# Patient Record
Sex: FEMALE | Race: WHITE | Employment: OTHER | URBAN - METROPOLITAN AREA
[De-identification: names, ages, dates, MRNs, and addresses within clinical notes are randomized per-mention and may not be internally consistent; named-entity substitution may affect disease eponyms.]

---

## 2021-05-21 ENCOUNTER — HOSPITAL ENCOUNTER (INPATIENT)
Age: 76
LOS: 22 days | Discharge: HOME HEALTH | DRG: 057 | End: 2021-06-12
Attending: PHYSICAL MEDICINE & REHABILITATION | Admitting: PHYSICAL MEDICINE & REHABILITATION
Payer: MEDICARE

## 2021-05-21 PROBLEM — I61.2: Status: ACTIVE | Noted: 2021-05-21

## 2021-05-21 PROCEDURE — 1280000000 HC REHAB R&B

## 2021-05-21 PROCEDURE — 6370000000 HC RX 637 (ALT 250 FOR IP): Performed by: PHYSICAL MEDICINE & REHABILITATION

## 2021-05-21 RX ORDER — POTASSIUM CHLORIDE 20MEQ/15ML
20 LIQUID (ML) ORAL DAILY
Status: ON HOLD | COMMUNITY
End: 2021-06-11 | Stop reason: HOSPADM

## 2021-05-21 RX ORDER — MENTHOL 40 MG/ML
GEL TOPICAL 3 TIMES DAILY
COMMUNITY

## 2021-05-21 RX ORDER — FUROSEMIDE 20 MG/1
20 TABLET ORAL 2 TIMES DAILY
Status: ON HOLD | COMMUNITY
End: 2021-06-11 | Stop reason: HOSPADM

## 2021-05-21 RX ORDER — BISACODYL 10 MG
10 SUPPOSITORY, RECTAL RECTAL DAILY PRN
Status: DISCONTINUED | OUTPATIENT
Start: 2021-05-21 | End: 2021-06-12 | Stop reason: HOSPADM

## 2021-05-21 RX ORDER — ZINC OXIDE AND DIMETHICONE 120; 10 MG/G; MG/G
CREAM TOPICAL 2 TIMES DAILY PRN
COMMUNITY

## 2021-05-21 RX ORDER — VITAMIN B COMPLEX
2000 TABLET ORAL DAILY
Status: DISCONTINUED | OUTPATIENT
Start: 2021-05-21 | End: 2021-06-12 | Stop reason: HOSPADM

## 2021-05-21 RX ORDER — ATORVASTATIN CALCIUM 40 MG/1
40 TABLET, FILM COATED ORAL DAILY
COMMUNITY

## 2021-05-21 RX ORDER — PREDNISONE 1 MG/1
7.5 TABLET ORAL DAILY
COMMUNITY

## 2021-05-21 RX ORDER — CITALOPRAM 20 MG/1
20 TABLET ORAL DAILY
Status: DISCONTINUED | OUTPATIENT
Start: 2021-05-21 | End: 2021-06-12 | Stop reason: HOSPADM

## 2021-05-21 RX ORDER — LISINOPRIL 40 MG/1
40 TABLET ORAL DAILY
Status: DISCONTINUED | OUTPATIENT
Start: 2021-05-21 | End: 2021-06-12 | Stop reason: HOSPADM

## 2021-05-21 RX ORDER — CARVEDILOL 12.5 MG/1
25 TABLET ORAL 2 TIMES DAILY WITH MEALS
Status: DISCONTINUED | OUTPATIENT
Start: 2021-05-21 | End: 2021-06-12 | Stop reason: HOSPADM

## 2021-05-21 RX ORDER — LANOLIN ALCOHOL/MO/W.PET/CERES
3 CREAM (GRAM) TOPICAL DAILY
Status: ON HOLD | COMMUNITY
End: 2021-06-11 | Stop reason: HOSPADM

## 2021-05-21 RX ORDER — POLYETHYLENE GLYCOL 3350 17 G/17G
17 POWDER, FOR SOLUTION ORAL DAILY
Status: DISCONTINUED | OUTPATIENT
Start: 2021-05-21 | End: 2021-06-12 | Stop reason: HOSPADM

## 2021-05-21 RX ORDER — LANSOPRAZOLE 30 MG/1
30 CAPSULE, DELAYED RELEASE ORAL DAILY
Status: ON HOLD | COMMUNITY
End: 2021-06-11 | Stop reason: HOSPADM

## 2021-05-21 RX ORDER — FUROSEMIDE 20 MG/1
20 TABLET ORAL DAILY
Status: DISCONTINUED | OUTPATIENT
Start: 2021-05-21 | End: 2021-06-12 | Stop reason: HOSPADM

## 2021-05-21 RX ORDER — ASPIRIN 81 MG/1
81 TABLET, CHEWABLE ORAL DAILY
COMMUNITY

## 2021-05-21 RX ORDER — LEVOTHYROXINE SODIUM 0.05 MG/1
50 TABLET ORAL DAILY
Status: ON HOLD | COMMUNITY
End: 2021-06-11 | Stop reason: HOSPADM

## 2021-05-21 RX ORDER — PREDNISONE 1 MG/1
7.5 TABLET ORAL DAILY
Status: DISCONTINUED | OUTPATIENT
Start: 2021-05-21 | End: 2021-06-12 | Stop reason: HOSPADM

## 2021-05-21 RX ORDER — ACETAMINOPHEN 500 MG
650 TABLET ORAL EVERY 6 HOURS PRN
COMMUNITY

## 2021-05-21 RX ORDER — ECHINACEA PURPUREA EXTRACT 125 MG
1 TABLET ORAL PRN
COMMUNITY

## 2021-05-21 RX ORDER — LISINOPRIL 40 MG/1
40 TABLET ORAL DAILY
COMMUNITY

## 2021-05-21 RX ORDER — CITALOPRAM 20 MG/1
20 TABLET ORAL DAILY
COMMUNITY

## 2021-05-21 RX ORDER — AMLODIPINE BESYLATE 5 MG/1
5 TABLET ORAL DAILY
Status: DISCONTINUED | OUTPATIENT
Start: 2021-05-21 | End: 2021-06-12 | Stop reason: HOSPADM

## 2021-05-21 RX ORDER — ONDANSETRON HYDROCHLORIDE 8 MG/1
4 TABLET, FILM COATED ORAL EVERY 8 HOURS PRN
Status: DISCONTINUED | OUTPATIENT
Start: 2021-05-21 | End: 2021-06-12 | Stop reason: HOSPADM

## 2021-05-21 RX ORDER — MECOBALAMIN 5000 MCG
5 TABLET,DISINTEGRATING ORAL NIGHTLY
Status: DISCONTINUED | OUTPATIENT
Start: 2021-05-21 | End: 2021-06-12 | Stop reason: HOSPADM

## 2021-05-21 RX ORDER — CARVEDILOL 25 MG/1
25 TABLET ORAL 2 TIMES DAILY WITH MEALS
COMMUNITY

## 2021-05-21 RX ORDER — ATORVASTATIN CALCIUM 40 MG/1
40 TABLET, FILM COATED ORAL NIGHTLY
Status: DISCONTINUED | OUTPATIENT
Start: 2021-05-21 | End: 2021-06-12 | Stop reason: HOSPADM

## 2021-05-21 RX ORDER — ASPIRIN 81 MG/1
81 TABLET, CHEWABLE ORAL DAILY
Status: DISCONTINUED | OUTPATIENT
Start: 2021-05-21 | End: 2021-06-12 | Stop reason: HOSPADM

## 2021-05-21 RX ORDER — LEVOTHYROXINE SODIUM 0.05 MG/1
50 TABLET ORAL DAILY
Status: DISCONTINUED | OUTPATIENT
Start: 2021-05-21 | End: 2021-06-12 | Stop reason: HOSPADM

## 2021-05-21 RX ORDER — SENNA AND DOCUSATE SODIUM 50; 8.6 MG/1; MG/1
2 TABLET, FILM COATED ORAL 2 TIMES DAILY
Status: DISCONTINUED | OUTPATIENT
Start: 2021-05-21 | End: 2021-05-26

## 2021-05-21 RX ORDER — PANTOPRAZOLE SODIUM 40 MG/1
40 TABLET, DELAYED RELEASE ORAL
Status: DISCONTINUED | OUTPATIENT
Start: 2021-05-22 | End: 2021-06-12 | Stop reason: HOSPADM

## 2021-05-21 RX ORDER — ACETAMINOPHEN 325 MG/1
650 TABLET ORAL EVERY 4 HOURS PRN
Status: DISCONTINUED | OUTPATIENT
Start: 2021-05-21 | End: 2021-06-12 | Stop reason: HOSPADM

## 2021-05-21 RX ADMIN — ATORVASTATIN CALCIUM 40 MG: 40 TABLET, FILM COATED ORAL at 22:40

## 2021-05-21 RX ADMIN — DOCUSATE SODIUM 50 MG AND SENNOSIDES 8.6 MG 2 TABLET: 8.6; 5 TABLET, FILM COATED ORAL at 23:34

## 2021-05-21 RX ADMIN — CARVEDILOL 25 MG: 12.5 TABLET, FILM COATED ORAL at 22:40

## 2021-05-21 RX ADMIN — Medication 5 MG: at 22:40

## 2021-05-21 ASSESSMENT — PAIN SCALES - GENERAL
PAINLEVEL_OUTOF10: 0

## 2021-05-21 NOTE — PROGRESS NOTES
Pt admitted into room 3104. Vital signs as charted. Pt currently denies experiencing any pain at this time. For patient safety, Visual Surveillance is in place, reviewed with patient/family, verbalized understanding. Call light placed within reach. RN will continue to monitor Pt.

## 2021-05-21 NOTE — PROGRESS NOTES
4 Eyes Admission Assessment     I agree as the admission nurse that 2 RN's have performed a thorough Head to Toe Skin Assessment on the patient. ALL assessment sites listed below have been assessed on admission. Areas assessed by both nurses: Liz RN and Ines RN  [x]   Head, Face, and Ears   [x]   Shoulders, Back, and Chest  [x]   Arms, Elbows, and Hands   [x]   Coccyx, Sacrum, and Ischium  [x]   Legs, Feet, and Heels        Does the Patient have Skin Breakdown? Pt has scattered bruising and abrasions. Pt had shearing noted to her bilat hips with petechiae and to her groin folds. Pt's periarea was pink. Pt's coccyx is pink and blanchable. Pt has a healing PEG tube site to her mid upper abdomen that is scabbed over. Pt has an abrasion to her L ring finger, bruising and an abrasion to her L middle finger, and bruising and a scabbed laceration to her L index finger. Pt has a colostomy to her LLQ and anus has previously been surgically closed.           Sandoval Prevention initiated:  Yes   Wound Care Orders initiated: N/A      WOC nurse consulted for Pressure Injury (Stage 3,4, Unstageable, DTI, NWPT, and Complex wounds) or Sandoval score 18 or lower:  Yes      Nurse 1 eSignature: Electronically signed by Mumtaz Machado RN on 5/21/21 at 6:29 PM EDT    **SHARE this note so that the co-signing nurse is able to place an eSignature**    Nurse 2 eSignature: Milla Dickerson RN

## 2021-05-21 NOTE — PLAN OF CARE
grooming containers I'ly :  Long term goals  Time Frame for Long term goals : 4 weeks  Long term goal 1: Pt will complete toilet transfer w/ CGA  Long term goal 2: Pt will complete toileting tasks w/ CGA  Long term goal 3: Pt will complete LE dressing (either supine or bed or seated ) w/ CGA  Long term goal 4: Pt will complete UE dressing w/ setup and spvn  Long term goal 5: Pt will complete bathing tasks w/ SBA :  These goals were reviewed with this patient at the time of assessment and Kim Rae is in agreement    Plan of Care:  Pt to be seen 5 out of 7 days per week per ARU protocol (75 minutes with OT)  Patient Education: Pt verb understanding    SPEECH THERAPY:  Goals:             Short-term Goals  Timeframe for Short-term Goals: 1-2 wks or LOS  Goal 1: Patient will participate in ongoing cognitive-linguistic assessment. Goal 2: Patient will complete tasks involving executive function skills with 90% accuracy/min cues. Timeframe for Short-term Goals: 1-2 wks or LOS      Plan of Care:  Pt to be seen 5 out of 7 days per week per ARU protocol (30 minutes with SLP)    Therapy Treatments will include:  [x]  therapeutic exercises    [x]  gait training     [x]  neuromuscular re-ed                            [x]  transfer training             [x] community reintegration    [x] bed mobility                          [x]  w/c mobility and training  [x]  self care    [x]home mgmt    [x]  cognitive training            [x]  energy conservation        []  dysphagia tx    []  speech/language/communication therapy   []  group therapy    [x]  patient/family education    [] Other:    CASE MANAGEMENT:  Goals: Patient is from home where she was independent at baseline. Goal is to return back home as close to baseline as possible. SW to work with patient on any type of DME equipment needs, family/community support, and home set up to facility return to patient's home.    Assist patient/family with discharge planning, patient/family counseling,   and coordination with insurance during ARU stay.     Admission Period/Goal QM SCORES  QM Admit/Goal Score   Eating   / Discharge Goal: Independent   Oral Hygiene   / Discharge Goal: Independent   Shower/Bathing CARE Score: 2 / Discharge Goal: Supervision or touching assistance   UB Dressing CARE Score: 3 / Discharge Goal: Supervision or touching assistance   LB Dressing CARE Score: 1 / Discharge Goal: Supervision or touching assistance   Putting on/off Footwear CARE Score: 1 / Discharge Goal: Supervision or touching assistance   Toileting Hygiene CARE Score: 1 / Discharge Goal: Supervision or touching assistance   Bladder Continence Bladder Continence: Incontinent daily    Bowel Continence Bowel Continence: Not rated    Toilet Transfers CARE Score: 1 / Discharge Goal: Supervision or touching assistance   Shower/Bathe Self  CARE Score: 2 / Discharge Goal: Supervision or touching assistance   Rolling Left and Right CARE Score: 3 / Discharge Goal: Supervision or touching assistance   Sit to Lying CARE Score: 4 / Discharge Goal: Supervision or touching assistance   Lying to Sitting on Bedside CARE Score: 3 / Discharge Goal: Supervision or touching assistance   Sit to Stand CARE Score: 1 / Discharge Goal: Partial/moderate assistance   Chair/Bed to Chair Transfer CARE Score: 1 / Discharge Goal: Supervision or touching assistance   Car Transfers CARE Score: 1 / Discharge Goal: Partial/moderate assistance   Walk 10 Feet CARE Score: 9 / Discharge Goal: Not Applicable   Walk 50 Feet with Two Turns CARE Score: 9 / Discharge Goal: Not Applicable   Walk 796 Feet CARE Score: 9 / Discharge Goal: Not Applicable   Walk 10 Feet on Uneven Surfaces CARE Score: 9 / Discharge Goal: Not Applicable   1 Step (Curb) CARE Score: 9 / Discharge Goal: Not Applicable   4 Steps CARE Score: 9 / Discharge Goal: Not Applicable   12 Steps CARE Score: 9 / Discharge Goal: Not Applicable   Picking up Object from Floor CARE Score: 88 / Discharge Goal: Independent   Wheel 50 Feet with 2 Turns   / Discharge Goal: Independent   Type         [] Manual        [] Motorized        [] N/A   Wheel 150 Feet   / Discharge Goal: Independent   Type         [] Manual        [] Motorized        [] N/A        Marilynn Glaser will be seen a minimum of 3 hours of therapy per day, a minimum of 5 out of 7 days per week (please see above for specific treatment plan per PT/OT/SLP). [] In this rare instance due to the nature of this patient's medical involvement, this patient will be seen 15 hours per week (900 minutes within a 7day period). In addition, dietician/nutritionist may monitor calorie count as well as intake and collaboratively work with SLP on dietary upgrades. Neuropsychology/Psychology may evaluate and provide necessary support. Medical issues being managed closely and that require 24hour availability of a physician:  [x] Swallowing Precautions  [x] Bowel/Bladder Fx  [] Weight bearing precautions  [] Wound Care    [x] Pain Mgmt   [] Infection Protection  [x] DVT Prophylaxis   [x] Fall Precautions  [x] Fluid/Electrolyte/Nutrition Balance  [x] Voice Protection   [] Respiratory  [] Other:    Medical Prognosis: [] Good  [x] Fair    [] Guarded   Total expected IRF days:  23  Anticipated discharge destination: Home with family  [] Home Independently   [x] Home Modified Independent  [] Home with supervision    []SNF     [] Other                                           Physician anticipated functional outcomes:  Home with LRAD  IPOC brief synthesis: Patient is a 79 yo female who originally admitted to hospital 1/23/21-2/9/21 for Right intracerebral hemorrhage with L hemiparesis. She has a know past medical hx of systolic CHF (EF 40%), A Fib (on warfarin), ICD, HTN, Hypothyroidism, rectal cancer s/p resection, and ITP. Her hemorrhage became worse with a midline shift and decline in her neurologic status.  Patient was started on seizure prophalxis, and neurosurgery followed, but no indication for surgery. She was also treated for UTI, anti-hypertensives adjusted to maintain bp in normal range  with ICH. She failed MBS and GI consulted who recommended PEG. She was also treated with zosyn for possible aspiration pneumonia. S/P PEG placed on 2/6/21. EGD recommended PPI therapy. She remained stable and was discharged to [facility]. Patient presented to SNF on 2/9/21. She improved with her swallowing and tolerated a regular diet,  and GI removed PEG on 5/18/21. Patient has made significant progress in her alertness and activity tolerance, and it is felt that she would benefit from and could tolerate intensive inpatient rehab unit treatment at this time.  Prior to her ICH, she was independent will all ADLs and functional mobility without any AD. She is currently supervision for bed mobility, Max A for stand pivot transfer, Osei for UE dress, MaxA for LB dressing, Osei for slide board transfers, and supervision for WC mobility. She is highly motivated and able to participate in 3 hrs of therapy per day in ARU setting.        This initial ARU patient treatment plan of care, together with the IPOC & the Education plan, form the foundation for the patient's plan of care. Weekly patient care conferences are held to evaluate progress towards the initial treatment plan & goals.     I have reviewed this initial plan of care and agree with its contents:    Title   Name    Date    Time    Physician:   Martin Gooden D.O. M.P.H  PM&R  5/24/2021  6:10 PM      Case Mgmt:ISSA Gerard, LSW 5/24/2021 0836     OT: Karli Lozano OTR/KAMRAN  5/22/2021  1303     PT: Garth Saucedo, PT, 5/22/21, 1250    RN: Madie Diaz RN 5/21/2021   18:40    ST: Jennifer Ashford CCC/SLP 5/22/2021 7854    :  Shakir Valerio MA, PD, 5/24/2021  14:47    Other:

## 2021-05-22 LAB
ANION GAP SERPL CALCULATED.3IONS-SCNC: 7 MMOL/L (ref 3–16)
BUN BLDV-MCNC: 10 MG/DL (ref 7–20)
CALCIUM SERPL-MCNC: 9.1 MG/DL (ref 8.3–10.6)
CHLORIDE BLD-SCNC: 103 MMOL/L (ref 99–110)
CO2: 28 MMOL/L (ref 21–32)
CREAT SERPL-MCNC: <0.5 MG/DL (ref 0.6–1.2)
GFR AFRICAN AMERICAN: >60
GFR NON-AFRICAN AMERICAN: >60
GLUCOSE BLD-MCNC: 105 MG/DL (ref 70–99)
HCT VFR BLD CALC: 33.2 % (ref 36–48)
HEMOGLOBIN: 11.7 G/DL (ref 12–16)
MAGNESIUM: 1.5 MG/DL (ref 1.8–2.4)
MCH RBC QN AUTO: 31.4 PG (ref 26–34)
MCHC RBC AUTO-ENTMCNC: 35.2 G/DL (ref 31–36)
MCV RBC AUTO: 89.2 FL (ref 80–100)
PDW BLD-RTO: 14.4 % (ref 12.4–15.4)
PLATELET # BLD: 152 K/UL (ref 135–450)
PMV BLD AUTO: 7.6 FL (ref 5–10.5)
POTASSIUM REFLEX MAGNESIUM: 3.4 MMOL/L (ref 3.5–5.1)
RBC # BLD: 3.73 M/UL (ref 4–5.2)
SODIUM BLD-SCNC: 138 MMOL/L (ref 136–145)
WBC # BLD: 5 K/UL (ref 4–11)

## 2021-05-22 PROCEDURE — 97530 THERAPEUTIC ACTIVITIES: CPT

## 2021-05-22 PROCEDURE — 97162 PT EVAL MOD COMPLEX 30 MIN: CPT

## 2021-05-22 PROCEDURE — 97535 SELF CARE MNGMENT TRAINING: CPT

## 2021-05-22 PROCEDURE — 83735 ASSAY OF MAGNESIUM: CPT

## 2021-05-22 PROCEDURE — 92523 SPEECH SOUND LANG COMPREHEN: CPT

## 2021-05-22 PROCEDURE — 1280000000 HC REHAB R&B

## 2021-05-22 PROCEDURE — 97167 OT EVAL HIGH COMPLEX 60 MIN: CPT

## 2021-05-22 PROCEDURE — 36415 COLL VENOUS BLD VENIPUNCTURE: CPT

## 2021-05-22 PROCEDURE — 85027 COMPLETE CBC AUTOMATED: CPT

## 2021-05-22 PROCEDURE — 6360000002 HC RX W HCPCS: Performed by: PHYSICAL MEDICINE & REHABILITATION

## 2021-05-22 PROCEDURE — 6370000000 HC RX 637 (ALT 250 FOR IP): Performed by: PHYSICAL MEDICINE & REHABILITATION

## 2021-05-22 PROCEDURE — 80048 BASIC METABOLIC PNL TOTAL CA: CPT

## 2021-05-22 RX ORDER — LANOLIN ALCOHOL/MO/W.PET/CERES
400 CREAM (GRAM) TOPICAL 4 TIMES DAILY
Status: DISCONTINUED | OUTPATIENT
Start: 2021-05-22 | End: 2021-06-12 | Stop reason: HOSPADM

## 2021-05-22 RX ORDER — POTASSIUM CHLORIDE 20 MEQ/1
20 TABLET, EXTENDED RELEASE ORAL
Status: DISCONTINUED | OUTPATIENT
Start: 2021-05-22 | End: 2021-06-12 | Stop reason: HOSPADM

## 2021-05-22 RX ADMIN — DOCUSATE SODIUM 50 MG AND SENNOSIDES 8.6 MG 2 TABLET: 8.6; 5 TABLET, FILM COATED ORAL at 20:51

## 2021-05-22 RX ADMIN — LISINOPRIL 40 MG: 40 TABLET ORAL at 10:00

## 2021-05-22 RX ADMIN — PANTOPRAZOLE SODIUM 40 MG: 40 TABLET, DELAYED RELEASE ORAL at 05:51

## 2021-05-22 RX ADMIN — CARVEDILOL 25 MG: 12.5 TABLET, FILM COATED ORAL at 18:32

## 2021-05-22 RX ADMIN — CITALOPRAM HYDROBROMIDE 20 MG: 20 TABLET ORAL at 10:01

## 2021-05-22 RX ADMIN — Medication 2000 UNITS: at 10:00

## 2021-05-22 RX ADMIN — PREDNISONE 7.5 MG: 5 TABLET ORAL at 10:01

## 2021-05-22 RX ADMIN — POLYETHYLENE GLYCOL 3350 17 G: 17 POWDER, FOR SOLUTION ORAL at 10:03

## 2021-05-22 RX ADMIN — MAGNESIUM OXIDE TAB 400 MG (240 MG ELEMENTAL MG) 400 MG: 400 (240 MG) TAB at 13:49

## 2021-05-22 RX ADMIN — AMLODIPINE BESYLATE 5 MG: 5 TABLET ORAL at 10:00

## 2021-05-22 RX ADMIN — MAGNESIUM OXIDE TAB 400 MG (240 MG ELEMENTAL MG) 400 MG: 400 (240 MG) TAB at 18:32

## 2021-05-22 RX ADMIN — LEVOTHYROXINE SODIUM 50 MCG: 0.05 TABLET ORAL at 05:51

## 2021-05-22 RX ADMIN — DOCUSATE SODIUM 50 MG AND SENNOSIDES 8.6 MG 2 TABLET: 8.6; 5 TABLET, FILM COATED ORAL at 10:01

## 2021-05-22 RX ADMIN — ASPIRIN 81 MG: 81 TABLET, CHEWABLE ORAL at 10:02

## 2021-05-22 RX ADMIN — ATORVASTATIN CALCIUM 40 MG: 40 TABLET, FILM COATED ORAL at 20:51

## 2021-05-22 RX ADMIN — MAGNESIUM OXIDE TAB 400 MG (240 MG ELEMENTAL MG) 400 MG: 400 (240 MG) TAB at 20:51

## 2021-05-22 RX ADMIN — POTASSIUM CHLORIDE 20 MEQ: 20 TABLET, EXTENDED RELEASE ORAL at 10:20

## 2021-05-22 RX ADMIN — Medication 5 MG: at 20:51

## 2021-05-22 RX ADMIN — FUROSEMIDE 20 MG: 20 TABLET ORAL at 10:01

## 2021-05-22 RX ADMIN — CARVEDILOL 25 MG: 12.5 TABLET, FILM COATED ORAL at 09:59

## 2021-05-22 RX ADMIN — ENOXAPARIN SODIUM 40 MG: 40 INJECTION SUBCUTANEOUS at 10:02

## 2021-05-22 ASSESSMENT — PAIN SCALES - GENERAL
PAINLEVEL_OUTOF10: 0

## 2021-05-22 NOTE — PLAN OF CARE
Problem: Falls - Risk of:  Goal: Will remain free from falls  Description: Will remain free from falls  Outcome: Ongoing   Pt. Admitted to facility with impaired gait and weakness. Fall prevention measures in place to promote safety and reduce the risk of falls: Fall sign posted on door, bed wheels locked and in lowest position, call light and over bed table placed within reach. Hourly rounding and frequent visual checks in place. Will continue to monitor. Problem: Skin Integrity:  Goal: Will show no infection signs and symptoms  Description: Will show no infection signs and symptoms  Outcome: Ongoing   Skin is kept clean and dry. Pt. Turned and reposition to relieve pressure off bony prominences to improve or maintain skin integrity. No s/sx of infection noted. Will continue to monitor.

## 2021-05-22 NOTE — PROGRESS NOTES
Pt is awake, A&O, assist to bathroom to void, with use of stedy and 3 staff, returned to bed, pericare provided, denies pain/discomfort at this time. Assessment completed as documented in flow sheet this am; Plan of care reviewed with patient who verbalized understanding. Meds reviewed with each administration, patient able to take all meds whole with water or pudding; VSS; Educated patient on fall risk prevention and general safety; Patient ate % of all meals. Colostomy bag intact; Fall precautions in place, bed alarm is activer wheels are locked;  Call light, over bed table and personal items in reach. Patient denies other needs at this time. Will continue to monitor.  Sunshine Sanders MSN, MA, RN

## 2021-05-22 NOTE — PROGRESS NOTES
Pt. Newly admitted to unit. Assessment complete, VSS, afebrile. Admission questions answered by patient and adult daughter. No medications were shown on patient's home med list. Daughter, Chente Agarwal, states the list of medications are in the discharge paper work and she is unsure of all the medications or when they were given. Pt is unable to confirm which medications were administered prior to admission to unit. RN updated patient's chart list of medication. Name of preferred pharmacy was given by daughter. RN contacted pharmacy to inform that patient could not confirm which daily medications were given/not given. All daily medication administration were held due to uncertainty of medication administration.

## 2021-05-22 NOTE — PROGRESS NOTES
Speech Language Pathology  Facility/Department: [facility] ACUTE REHAB UNIT  Initial Speech/Language/Cognitive Assessment    NAME: Yuliya Martinez  : 1945   MRN: [id number]  ADMISSION DATE: 2021  ADMITTING DIAGNOSIS: has 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, unspecified ([address] on their problem list.  DATE ONSET: 2021    Date of Eval: 2021   Evaluating Therapist: LELIA Varma    RECENT RESULTS  CT OF HEAD/MRI: imaging not available    Primary Complaint: states her thinking skills seem about normal    Pain:  Pain Assessment  Pain Assessment: 0-10  Pain Level: 0  Patient's Stated Pain Goal: No pain    Assessment:  Cognitive Diagnosis: Mild cognitive-linguistic deficit (specifically executive function)   Diagnosis: Pt was administered the Cognitive Linguistic Quick Test (CLQT) with the following results:    Attention- [facility]  Memory- [facility]  Executive Functions- mild  Language- WFL  Visuospatial Skills- WFL  Composite Severity Rating- WFL  Clock Drawing Severity Rating- L    Mild difficulty  noted to required repetition of instructions several times during visit. Discussed noted with executive function skills, and ptwith daughter, Loreta Real, who reports she has noticed short term memory/delayed recall no longer at baseline (e.g., pt will speak to daughter on the phone, and then again 15 minutes later will have forgotten, etc.). Recommend further assessment prior, and ongoing therapy to address cognitive-linguistic assessment. Recommendations:  Requires SLP Intervention: Yes  Duration/Frequency of Treatment: 30 min, 5x/wk, for 1-2 wks or LOS  D/C Recommendations: To be determined       Plan:   Goals:  Short-term Goals  Timeframe for Short-term Goals: 1-2 wks or LOS  Goal 1: Patient will participate in ongoing cognitive-linguistic assessment. Goal 2: Patient will complete tasks involving executive function skills with 90% accuracy/min cues.   Patient/family involved in developing goals and treatment plan: yes, patient and daughter    Subjective:   Previous level of function and limitations: Independent  General  Chart Reviewed: Yes  Patient assessed for rehabilitation services?: Yes  Family / Caregiver Present: No  General Comment  Comments: Per admitting H&P (05/21/2021): 'Patient is a 68 yo female who originally admitted to [facility] /23/21-2/9/21 for Right intracerebral hemorrhage with L hemiparesis. She has a know past medical hx of systolic CHF (EF 51%), A Fib (on warfarin), ICD, HTN, Hypothyroidism, rectal cancer s/p resection, and ITP. Her hemorrhage became worse with a midline shift and decline in her neurologic status. Patient was started on seizure prophalxis, and neurosurgery followed, but no indication for surgery. She was also treated for UTI, anti-hypertensives adjusted to maintain bp in normal range  with ICH. She failed MBS and GI consulted who recommended PEG. She was also treated with zosyn for possible aspiration pneumonia. S/P PEG placed on 2/6/21. EGD recommended PPI therapy. She remained stable and was discharged to [facility]. Patient presented to SNF on 2/9/21. She improved with her swallowing and tolerated a regular diet,  and GI removed PEG on 5/18/21. Patient has made significant progress in her alertness and activity tolerance, and it is felt that she would benefit from and could tolerate intensive inpatient rehab unit treatment at this time. Prior to her ICH, she was independent will all ADLs and functional mobility without any AD. She is currently supervision for bed mobility, Max A for stand pivot transfer, Osei for UE dress, MaxA for LB dressing, Osei for slide board transfers, and supervision for WC mobility. She is highly motivated and able to participate in 3 hrs of therapy per day in ARU setting. '  Subjective  Subjective: Patient awake, alert, pleasant, seen seated up in bed, on room air.   Social/Functional History  Lives With: Spouse;Daughter Active : Yes  Occupation: Unemployed  [address]: Enjoys going to the [employer]  Additional Comments: Manages own medications, finances  Vision  Vision: Impaired  Vision Exceptions: Wears glasses at all times  Hearing  Hearing: Within functional limits        Objective:  Oral/Motor  Oral Motor: Within functional limits    Auditory Comprehension  Comprehension: Within Functional Limits    Expression  Primary Mode of Expression: Verbal    Verbal Expression  Verbal Expression: Within functional limits    Written Expression  Dominant Hand: Right    Motor Speech  Motor Speech: Within Functional Limits    Pragmatics/Social Functioning  Pragmatics: Within functional limits    Cognition:   Orientation  Overall Orientation Status: Within Functional Limits  Attention  Attention: Within Functional Limits  Memory  Memory: Within Funtional Limits  Problem Solving  Problem Solving: Exceptions to [facility]  Complex Functional Tasks: To be assessed in therapy  Managing Finances: To be assessed in therapy  Managing Medications: To be assessed in therapy  Numeric Reasoning  Numeric Reasoning: Exceptions to [facility]   Calculations: To be assessed in therapy  Money Management: To be assessed in therapy  Time: To be assessed in therapy  Abstract Reasoning  Abstract Reasoning: Exceptions to [facility]    Additional Assessments:  Pt currently on and tolerating regular solids / thin liquids. Per chart review and family h/o, pt had previous dysphagia, requiring PEG tube, which was recently removed and diet upgraded to regular. Discussed with pt/family, not formally assessed. Prognosis:  Speech Therapy Prognosis  Prognosis: Good  Individuals consulted  Consulted and agree with results and recommendations: Patient;RN    Education:  Patient Education: Educated pt re: role of SLP, purpose of visit, recommendations. Patient Education Response: Verbalizes understanding  Safety Devices in place: Yes  Type of devices:  All fall risk precautions in place; Left in bed;Bed alarm in place;Call light within reach    Therapy Time:   Individual Concurrent Group Co-treatment   Time In 1115 0000 0000 0000   Time Out 1215 0000 0000 0000   Minutes 60 0 0 0   Variance: 0  Timed Code Treatment Minutes: 0 Minutes  Total Treatment Time: [address], [place], [place]ogan [id number]  Speech-Language Pathologist

## 2021-05-22 NOTE — PROGRESS NOTES
Occupational Therapy   Occupational Therapy Initial Assessment/Treatment Note   Date: 2021   Patient Name: Domo Monzon  MRN: [id number]     : 1945    Date of Service: 2021    Discharge Recommendations:  Home with Home health OT, Home with nursing aide, 24 hour supervision or assist, Continue to assess pending progress  OT Equipment Recommendations  Other: Pt is unsure if she has a shower chair. Clarification required w/ family. Continue to assess for needs    Assessment   Performance deficits / Impairments: Decreased functional mobility ; Decreased cognition;Decreased ADL status; Decreased endurance;Decreased fine motor control;Decreased ROM; Decreased sensation;Decreased coordination;Decreased strength;Decreased balance;Decreased posture;Decreased safe awareness  Assessment: Pt is a 66 yo female who presents to [facility] from [facility] 2/2 CVA. Pt had been recovering at the SNF since 2021 and was making good progress. Prior to CVA pt was independent w/ ADLs, functional mobility, and IADLs. Pt is now functioning significantly below her baseline to LUE and LLE weakness, impaired cognition, impaired sensation, and impaired coordination. Pt is highly motivated and benefits from OT in order to maximize functional independence. Treatment Diagnosis: Decreased ADL status and decreased ROM 2/2 CVA  Prognosis: Fair  Decision Making: High Complexity    OT Education: OT Role;Plan of Care;Precautions; ADL Adaptive Strategies;Transfer Training;Orientation  Patient Education: Pt verb understanding  REQUIRES OT FOLLOW UP: Yes  Activity Tolerance  Activity Tolerance: Patient Tolerated treatment well  Safety Devices  Safety Devices in place: Yes  Type of devices: Chair alarm in place; Left in chair;Call light within reach;Nurse notified           Patient Diagnosis(es): There were no encounter diagnoses.      has a past medical history of Arthritis, CAD (coronary artery disease), Cancer ([facility] [address] Cerebral artery occlusion with cerebral infarction [facility]), CHF (congestive heart failure) ([facility] [address] Hypertension, and Thyroid disease. has a past surgical history that includes Abdomen surgery; Hysterectomy; Colonoscopy; hernia repair; and pacemaker placement. Treatment Diagnosis: Decreased ADL status and decreased ROM 2/2 CVA      Restrictions  Position Activity Restriction  Other position/activity restrictions: fall precautions    Subjective   General  Chart Reviewed: Yes  Patient assessed for rehabilitation services?: Yes  Additional Pertinent Hx: Per MD H and P:Patient is a 66 yo female who originally admitted to hospital /23/21-2/9/21 for Right intracerebral hemorrhage with L hemiparesis. She has a know past medical hx of systolic CHF (EF 03%), A Fib (on warfarin), ICD, HTN, Hypothyroidism, rectal cancer s/p resection, and ITP. Her hemorrhage became worse with a midline shift and decline in her neurologic status. Patient was started on seizure prophalxis, and neurosurgery followed, but no indication for surgery. She was also treated for UTI, anti-hypertensives adjusted to maintain bp in normal range  with ICH. She failed MBS and GI consulted who recommended PEG. She was also treated with zosyn for possible aspiration pneumonia. S/P PEG placed on 2/6/21. EGD recommended PPI therapy. She remained stable and was discharged to [facility]. Patient presented to SNF on 2/9/21. She improved with her swallowing and tolerated a regular diet,  and GI removed PEG on 5/18/21. Pt had been at the [facility] since February and has now exhausted her days left at the SNF. Pt admitted to ARU 5/21  Family / Caregiver Present: No  Referring Practitioner: Dr. García Baez  Diagnosis: CVA  Subjective  Subjective: Pt was seated at EOB w/ PT present upon arrival. Pt was pleasant and agreeable to OT evaluation. Pt reported she needed to use the bathroom.          Social/Functional History  Social/Functional History  Lives With: Spouse, Daughter (daughter [place])  Type of Home: House  Home Layout: One level, Able to Live on Main level with bedroom/bathroom  Home Access: Level entry  Bathroom Shower/Tub: Walk-in shower (Pt was unsure if shower was walk in or a tub)  Bathroom Toilet: Standard  Bathroom Equipment:  (Pt could not recall if she has a shower chair)  Bathroom Accessibility: Accessible  Home Equipment: Rolling walker  ADL Assistance: Independent  Homemaking Assistance: Independent  Ambulation Assistance: Independent (without AD)  Transfer Assistance: Independent  Active : Yes  Leisure & Hobbies: going to the [employer]  Additional Comments: Pt unsure if she will have 24 hr supervision/assist at d/c.         Objective        Orientation  Overall Orientation Status: Within Functional Limits    Observation/Palpation  Observation: Redness noted to R inner and upper thigh. Ostomy present    Balance  Sitting Balance: Contact guard assistance (CGA seated EOB; SBA seated on rolling shower chair)  Standing Balance: Dependent/Total (Partial stance + stance in gary stedy)  Standing Balance  Time: ~1 min total  Activity: Partial stance attempted from rolling shower chair in order to complete pants management. Max Ax2 was provided however pt could not sustain partial stance in order to pull complete pants management. The gary stedy was utilized w/ max Ax 2-3 in order to assist pt into standing position. Pt demonstrated poor engagement of LLE and nearly no contraction noted to assist w/ stance. Slight lateral lean noted in stance on gary stedy requiring TCs and VCs to correct. Functional Mobility  Functional - Mobility Device: Wheelchair (+ rolling shower chair)  Activity: To/from bathroom  Functional Mobility Comments: Pt was assisted in w/c into bathroom and then from rolling shower chair out of the bathroom. Due to time constraints w/c mobility was not assessed this session. Pt would benefit from a cong height w/c. Toilet Transfers  Toilet - Technique:  Sit pivot  Equipment Used:  (rolling shower chair)  Toilet Transfer: Dependent/Total  Toilet Transfers Comments: Pt required Max Ax2 from w/c > rolling shower chair. Rolling shower chair was position to pt's R to go to pt's strong side. OT and PT first attempted transfer from w/c > RTS which was unsuccessful and pt required assist x3 to safely get pt back into w/c. Shower Transfers  Shower - Transfer From: Wheelchair (rolling shower chair)  Shower - Transfer Type: To and From  Shower - Transfer To:  (rolling shower chair)  Shower Transfers: Dependent  Shower Transfers Comments: Pt required Max Ax2 from w/c > rolling shower chair. Pt was assisted in rolling shower chair into the shower. This was also utilized for toileting. OT recommended use of rolling shower chair for toileting to allow pt to always transfer to her R side as transfer to her L was highly unsuccessful. ADL  Grooming: Setup (Pt washed her face seated on rolling shower chair and combed her hair w/ RUE)  UE Bathing: Setup;Verbal cueing; Increased time to complete;Minimal assistance (Pt initiated washing chest, abdomen, and BUEs w/ RUE. Pt was able to hold the wash cloth in LUE to reach RUE but w/ a weak grasp noted resulting in decreased thoroughness. Min A was needed to completely wash and dry RUE)  LE Bathing: Maximum assistance (Pt was able to was top of B thighs but required assistance to reach BLEs and to wash buttocks from seated position.)  UE Dressing: Setup; Increased time to complete; Moderate assistance (Pt required assistance to clasp bra in the back, adjust bra, and adjust shirt in the back. Pt was able to thread BUEs into tshirt and bra w/ VCs to thread LUE first.)  LE Dressing: Dependent/Total (Pt required assistance to thread BLEs into underwear and pants. Max Ax2 was required in partial stance to manage clothes over hips. Total A needed to don socks and shoes.)  Toileting: Dependent/Total (Pt required assist x2 to manage underwear down.  Pt was able to wipe front marilyn area after voiding.)  Additional Comments: Pt had urgent need to use the bathroom at beginning of OT evaluation. Pt was assisted from EOB > w/c and w/c > RTS. During transition to RTS, pt had significant extensor tone to LLE which resulted in unsafe transfer to RTS. Pt was not positioned completely on the RTS and pt was unable to assist w/ scooting hips. PT, OT, and RN assisted pt back into the w/c. Once pt was positioned in w/c, pt was then assisted from w/c > rolling shower chair. Pt was then assisted in the rolling shower chair over the toilet to void. Upon completion of toileting task pt was assisted in rolling shower chair into the shower. Pt maintained seated balance on rolling shower chair w/ SBA. Mod VCs were needed throughout bathing tasks to attend to LUE to prevent LUE from getting stuck between chair and GB. Pt demonstrated initiation of LUE w/ VCs as evident by grasping for the wash cloth, however pt's  strength was weak and this resulted in pt dropping wash cloth from L hand. Pt had some decreased recall of events she had already completely, for example pt did not remember that she had already washed under her arms. Pt had poor sensation to LUE as pt was unaware of when the water was running on her L. Tone RUE  RUE Tone: Normotonic  Tone LUE  LUE Tone: Hypertonic  Tone Description: hypertonicity noted to L elbow. Extensor tone present to LLE w/ transfers  Coordination  Coordination and Movement description: Fine motor impairments;Gross motor impairments; Left UE       Bed mobility  Sit to Supine: Contact guard assistance    Transfers  Sit Pivot Transfers: Dependent/Total (Pt completed squat pivot from EOB > w/c w/ Max A x2.)  Sit to stand: Dependent/Total (Max Ax 2 -3 was required from w/c to [facility].  Pt demonstrated poor egagement of LLE resulting in significant amount of assistance required.)  Stand to sit: Dependent/Total (Max A x2 needed from [address] [place] [address] to recliner chair)       Cognition  Overall Cognitive Status: Exceptions  Arousal/Alertness: Appropriate responses to stimuli  Following Commands: Follows multistep commands with increased time; Follows multistep commands with repitition  Attention Span: Appears intact  Memory: Decreased recall of recent events  Safety Judgement: Decreased awareness of need for assistance  Problem Solving: Assistance required to generate solutions  Insights: Decreased awareness of deficits  Initiation: Requires cues for some  Sequencing: Does not require cues  Cognition Comment: Pt was pleasant and motivated throughout session. Pt had some decreased recall of recent events as evident by pt forgetting which body parts she had already washed. Pt also had poor recall of her home setup and could not recall the layout or equipment she had at home. Pt demonstrated decreased awareness of LUE at times and required VCs for safety to prevent LUE from getting stuck at the GB in the shower. Pt required min VCs to initiate use of LUE for bathing tasks. Perception  Overall Perceptual Status: Impaired  Unilateral Attention: Cues to attend to left side of body  Initiation: Cues to initiate tasks       Sensation  Overall Sensation Status: Impaired (intact to light touch over RLE. Pt c/o tingling to LLE. Pt w/ c/o some tingling to LUE and decreased sensation noted to light touch and temperature to LUE.)          LUE AROM (degrees)  LUE General AROM: LUE shoulder flexion ~45 degrees. Pt was able to actively flex/extend L hand but w/ weak grasp noted. More thorough assessmented needed however due to time constraints was unable to be completed this session.   RUE AROM (degrees)  RUE AROM : WFL                        Plan   Plan  Times per week: 5x a week for 75 mins daily  Times per day: Daily  Current Treatment Recommendations: Strengthening, Wheelchair Mobility Training, Positioning, ROM, Safety Education & Training, Balance Training, Patient/Caregiver Education & Training, Self-Care / ADL, Cognitive/Perceptual Training, Functional Mobility Training, Neuromuscular Re-education, Equipment Evaluation, Education, & procurement, Home Management Training, Endurance Training    G-Code     OutComes Score                                                  AM-PAC Score             Goals  Short term goals  Time Frame for Short term goals: 2 weeks  Short term goal 1: Pt will complete toilet transfer w/ Mod A  Short term goal 2: Pt will complete LE dressing supine in bed w/ Min A  Short term goal 3: Pt will complete UE dressing w/ Min A  Short term goal 4: Pt will complete bathing tasks w/ Min A  Short term goal 5: Pt will demonstrate improved functional use of LUE as evident by pt ability to open grooming containers I'ly  Long term goals  Time Frame for Long term goals : 4 weeks  Long term goal 1: Pt will complete toilet transfer w/ CGA  Long term goal 2: Pt will complete toileting tasks w/ CGA  Long term goal 3: Pt will complete LE dressing (either supine or bed or seated ) w/ CGA  Long term goal 4: Pt will complete UE dressing w/ setup and spvn  Long term goal 5: Pt will complete bathing tasks w/ SBA  Patient Goals   Patient goals : \"be able to walk\"       Therapy Time   Individual Concurrent Group Co-treatment   Time In 0210     0745   Time Out 0920     0905   Minutes 15     80   Timed Code Treatment Minutes: 95 Minutes       Billy Devi OT

## 2021-05-22 NOTE — H&P
Department of [facility] History & Physical      Patient Identification:  Abhay Recio  : 1945  Admit date: 2021  Dictation date: 21   Attending provider: Compa Farrell MD        Primary care provider: No primary care provider on file. Chief Complaint:   Patient Active Problem List   Diagnosis    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, unspecified ([facility] [address]       History of Present Illness/Hospital Course:  Patient is a 68 yo female who originally admitted to hospital 21-21 for Right intracerebral hemorrhage with L hemiparesis. She has a know past medical hx of systolic CHF (EF 11%), A Fib (on warfarin), ICD, HTN, Hypothyroidism, rectal cancer s/p resection, and ITP. Her hemorrhage became worse with a midline shift and decline in her neurologic status. Patient was started on seizure prophalxis, and neurosurgery followed, but no indication for surgery. She was also treated for UTI, anti-hypertensives adjusted to maintain bp in normal range  with ICH. She failed MBS and GI consulted who recommended PEG. She was also treated with zosyn for possible aspiration pneumonia. S/P PEG placed on 21. EGD recommended PPI therapy. She remained stable and was discharged to [facility]. Patient presented to SNF on 21. She improved with her swallowing and tolerated a regular diet,  and GI removed PEG on 21. Patient has made significant progress in her alertness and activity tolerance, and it is felt that she would benefit from and could tolerate intensive inpatient rehab unit treatment at this time. Rg Arts to her [address], she was independent will all ADLs and functional mobility without any AD. She is currently supervision for bed mobility, Max A for stand pivot transfer, Osei for UE dress, MaxA for LB dressing, Osei for slide board transfers, and supervision for WC mobility.  She is highly motivated and able to participate in 3 hrs of therapy per day in ARU setting.     Prior Level of Function:  Independent in self care and ADLs prior to admission. Current Level of Function:  PT:  Patient needs supervision for bed mobility, Max A for stand pivot transfer and supervision for WC mobility    OT:   Osei for UE dress, MaxA for LB dressing, Osei for slide board transfers     SLP:   Patient will be evaluated       has a past medical history of Arthritis, CAD (coronary artery disease), Cancer ([facility] [address] Cerebral artery occlusion with [facility] infarction ([facility] CHF (congestive heart failure) ([facility] Hypertension, and Thyroid disease. reports that she has never smoked. She has never used smokeless tobacco. She reports previous alcohol use. She reports that she does not use drugs. family history includes Arthritis in her brother and mother; Cancer in her father; Coronary Art Dis in her mother; Heart Disease in her mother; Obesity in her mother; Other in her brother. Prior to Admission medications    Medication Sig Start Date End Date Taking? Authorizing Provider   lisinopril (PRINIVIL;ZESTRIL) 40 MG tablet Take 40 mg by mouth daily   Yes Historical Provider, MD   melatonin 3 MG TABS tablet Take 3 mg by mouth daily   Yes Historical Provider, MD   miconazole nitrate (LOTRIMIN AF) 2 % AERP Apply topically 2 times daily Apply to under abd folds topically  Times two daily. AM and night shift for prevention.    Yes Historical Provider, MD   potassium chloride 20 MEQ/15ML (10%) oral solution Take 20 mEq by mouth daily   Yes Historical Provider, MD   predniSONE (DELTASONE) 5 MG tablet Take 7.5 mg by mouth daily   Yes Historical Provider, MD   PSYLLIUM PO Take 1 packet by mouth daily   Yes Historical Provider, MD   sodium chloride (OCEAN) 0.65 % nasal spray 1 spray by Nasal route as needed for Congestion   Yes Historical Provider, MD   folic acid-pyridoxine-cyanocobalamine (FOLTX) 2.5-25-1 MG TABS tablet Take 1 tablet by mouth daily   Yes Historical sinus pressure, nasal congestion, epistaxis and snoring. RESPIRATORY: Negative for hemoptysis, cough, sputum production. CARDIOVASCULAR: negative for chest pain, palpitations, exertional chest pressure/discomfort, edema, syncope. +CHF, A. Fib  GASTROINTESTINAL: negative for nausea, vomiting, diarrhea, constipation, blood in stool and abdominal pain. GENITOURINARY: negative for frequency, dysuria, urinary incontinence, decreased urine volume, and hematuria. HEMATOLOGIC/LYMPHATIC: negative for easy bruising, bleeding and lymphadenopathy. + ITP,rectal cancer  ALLERGIC/IMMUNOLOGIC: negative for recurrent infections, angioedema, anaphylaxis and drug reactions. ENDOCRINE: negative for weight changes and diabetic symptoms including polyuria, polydipsia and polyphagia. + Hypothyroid  MUSCULOSKELETAL: negative for pain, joint swelling, decreased range of motion and muscle weakness. NEUROLOGICAL: negative for headaches, slurred speech, unilateral weakness. PSYCHIATRIC/BEHAVIORAL: negative for hallucinations, behavioral problems, confusion and agitation. All pertinent positives are noted in the HPI. Physical Examination:  Vitals:   Patient Vitals for the past 24 hrs:   BP Temp Temp src Pulse Resp SpO2 Height Weight   05/21/21 2042 129/65 97.5 °F (36.4 °C) Oral 83 16 97 %     05/21/21 2015       5' 2\" (1.575 m) 170 lb 13.7 oz (77.5 kg)   05/21/21 1720 134/71 97.9 °F (36.6 °C) Oral 74 16 97 %       Psych: Stable mood, normal judgement, normal affect , has left neglect noted  Const: No distress  Eyes: Conjunctiva noninjected, no icterus noted; pupils equal, round, and reactive to light. HENT: Atraumatic, normocephalic; Oral mucosa moist  Neck: Trachea midline, neck supple. No thyromegaly noted. CV: Regular rate and rhythm, no murmur rub or gallop noted, pacemaker in place  Resp: Lungs clear to auscultation bilaterally, no rales wheezes or ronchi, no retractions. Respirations unlabored.    GI: Obese, Soft, nontender, nondistended. Normal bowel sounds. No palpable masses. Neuro: Alert, oriented, appropriate. has left neglect noted. Motor examination reveals normal strength in right side, 3-/5 strength left side diffusely. Skin: Normal temperature and turgor  MSK: No joint abnormalities noted. Ext: No significant edema appreciated. No varicosities. Lab Results   Component Value Date    WBC 5.0 05/22/2021    HGB 11.7 (L) 05/22/2021    HCT 33.2 (L) 05/22/2021    MCV 89.2 05/22/2021     05/22/2021     No results found for: INR, PROTIME  Lab Results   Component Value Date    CREATININE <0.5 (L) 05/22/2021    BUN 10 05/22/2021     05/22/2021    K 3.4 (L) 05/22/2021     05/22/2021    CO2 28 05/22/2021     No results found for: ALT, AST, GGT, ALKPHOS, BILITOT    No results found. The above labs and diagnostic studies have been reviewed by myself upon admission to inpatient rehabilitation. Barriers to Discharge: Patient  has a past medical history of Arthritis, CAD (coronary artery disease), Cancer ([facility] [address] Cerebral artery occlusion with cerebral infarction [facility]), CHF (congestive heart failure) ([facility] Hypertension, and Thyroid disease. Pt lives at home with  before her stroke. Came from SNF. POST ADMISSION PHYSICIAN EVALUATION  The patient has agreed to being admitted to our comprehensive inpatient  rehabilitation facility consisting of at least 180 minutes of therapy a day,  5 out of 7 days a week. The patient/family has a good understanding of our discharge process. The  patient has potential to make improvement and is in need of at least two of  the following multidisciplinary therapies including but not limited to  physical, occupational, respiratory, and speech, nutritional services, wound care,   and prosthetics and orthotics.  Given the patients complex condition  and risk of further medical complications, rehabilitation services cannot be  safely provided at a

## 2021-05-22 NOTE — PLAN OF CARE
Completed speech evaluation. Please refer to EMR.     Sabine Horta M.A., Beto Hollis   Speech-Language Pathologist

## 2021-05-22 NOTE — PLAN OF CARE
Problem: Falls - Risk of:  Goal: Will remain free from falls  Description: Will remain free from falls  Outcome: Met This Shift  Note: Patient was free of fall during this shift, 3 person assist with a gary alcantara for safe transfer     Problem: Skin Integrity:  Goal: Will show no infection signs and symptoms  Description: Will show no infection signs and symptoms  Outcome: Met This Shift  Note: No signs of infection noted during this shift, no odor to urine, VSS     Problem: Skin Integrity:  Goal: Absence of new skin breakdown  Description: Absence of new skin breakdown  Outcome: Met This Shift  Note: No new skin breakdown between [id number]     Problem: Nutrition  Goal: Optimal nutrition therapy  5/22/2021 1950 by Thierno Stone RN  Outcome: Met This Shift  Note: Patient ate 75%-100% of breakfast, lunch and dinner     Lakisha Dawkins MSN, MA, RN

## 2021-05-22 NOTE — CONSULTS
NUTRITION ASSESSMENT  Admission Date: 5/21/2021     Type and Reason for Visit: Initial, Consult    NUTRITION RECOMMENDATIONS:   1. PO Diet: Continue general diet    2. ONS: start Ensure qd    NUTRITION ASSESSMENT:  Nutritional summary & status: New ARU admission. Noted pt with some confusion today. Pt working with other medical staff at time of visit, unable to see. Wt loss of -6% x 2 months per EMR. Noted pt had PEG and failed MBS. Pt is now on geneal diet and GI removed PEG on 5/18/21. Will offer ONS at this time and will monitor nutritional status this admission. Patient admitted d/t Nontraumatic intracerebral hemorrhage     PMH significant for: CHF, A Fib, ICD, HTN, Hypothyroidism, rectal cancer s/p resection, and ITP    MALNUTRITION ASSESSMENT  Context of Malnutrition: Acute Illness   Malnutrition Status: At risk for malnutrition (Comment)  Findings of the 6 clinical characteristics of malnutrition (Minimum of 2 out of 6 clinical characteristics is required to make the diagnosis of moderate or severe Protein Calorie Malnutrition based on AND/ASPEN Guidelines):  Energy Intake: Unable to Assess    Energy Intake Time: Unable to assess   Weight Loss %: 5% loss or greater    Weight loss Time: x2 months   Body Fat Loss: Unable to Assess   Body Fat Location: Unable to assess    Body Muscle Loss: Unable to Assess   Body Muscle Loss Location: Unable to assess    Fluid Accumulation: No significant    Fluid Accumulation Location: No significant     Strength: Not Performed;  Not Measured     NUTRITION DIAGNOSIS   Problem: Problem #1: Predicted inadequate energy intake   Etiology: Insufficient energy/nutrient consumption  Signs & Symptoms: Weight loss     NUTRITION INTERVENTION  Food and/or Nutrient Delivery: Continue Current Diet, Start Oral Nutrition Supplement   Nutrition Education/Counseling: No recommendation at this time   Coordination of Nutrition Care: Continue to monitor while inpatient     NUTRITION MONITORING & EVALUATION:  Evaluation:Goals set   Goals:Goals: Pt will meet >50% of nutritional needs this admission  Monitoring: Meal Intake , Supplement Intake  or Weight      OBJECTIVE DATA: Significant to nutrition assessment  · Nutrition-Focused Physical Findings: trace BLE edema, healing PEG tube   · Labs: Reviewed;   · Meds: Reviewed;   · Wounds None      ANTHROPOMETRICS  Current Height: 5' 2\" (157.5 cm)  Current Weight: 170 lb 13.7 oz (77.5 kg)    Admission weight: 170 lb 13.7 oz (77.5 kg)  Ideal Bodyweight 110 lb    Usual Bodyweight sonu   Weight Changes -6% x 2 months        BMI BMI (Calculated): 31.3    Wt Readings from Last 50 Encounters:   05/21/21 170 lb 13.7 oz (77.5 kg)       COMPARATIVE STANDARDS  Estimated Total Kcals/Day : 20-25 Current Bodyweight (76 kg) 0614-6648 kcal/day    Estimated Total Protein (g/day) : 1.3-1.5 Ideal Bodyweight  (50 kg) 65-75 g/day  Estimated Daily Total Fluid (ml/day): 8012-3794 mL per day     Food / Nutrition-Related History  Pre-Admission / Home Diet:  Pre-Admission/Home Diet: General   Home Supplements / Herbals:    none noted  Food Restrictions / Cultural Requests:    none noted    Current Nutrition Therapies   DIET GENERAL;     PO Intake: meals not recorded yet   PO Supplement: None   PO Supplement Intake: None   IVF: none   NUTRITION RISK LEVEL: Risk Level:  Moderate     Pham Davidson LD  [place]:  [phone]  Office:  [phone]

## 2021-05-22 NOTE — PROGRESS NOTES
for rehabilitation services?: Yes  Additional Pertinent Hx: Pt is a 67 y.o. female admitted to ARU on 5/21/21 from SNF. Per MD note, pt originally \"admitted to [address] from 1/23/21-2/9/21 for intracerebral hemorrhage with L hemiparesis. She originally presented to the ER with left sided weakness and bp was 191/161 so she was started on Cleviprex gtt. She has a known hx of systolic CHF (EF 05%), A Fib (on warfarin), ICD, HTN, Hypothyroidism, rectal cancer s/p resection, and ITP. \"  Pt d/c to SNF on 2/9/21 and made significant progress. She now admits to ARU. Family / Caregiver Present: No  Referring Practitioner: Howie London MD  Diagnosis: nontraumatic ICH  Follows Commands: Within Functional Limits  General Comment  Comments: Pt found supine in bed upon PT arrival.  Pt agreaable to therapy session. Subjective  Subjective: \"I should always go to my strong side. \"  Pain Screening  Patient Currently in Pain: Denies  Vital Signs  Patient Currently in Pain: Denies       Orientation  Orientation  Overall Orientation Status: Within Functional Limits  Social/Functional History  Social/Functional History  Lives With: Spouse, Daughter (daughter Huang Turcios)  Type of Home: House  Home Layout: One level, Able to Live on Main level with bedroom/bathroom  Home Access: Level entry  Bathroom Shower/Tub: Walk-in shower (Pt was unsure if shower was walk in or a tub)  Bathroom Toilet: Standard  Bathroom Equipment:  (Pt could not recall if she has a shower chair)  Bathroom Accessibility: Accessible  Home Equipment: Rolling walker  ADL Assistance: Independent  Homemaking Assistance: Independent  Ambulation Assistance: Independent (without AD)  Transfer Assistance: Independent  Active : Yes  Leisure & Hobbies: going to the [employer]  Additional Comments: Pt unsure if she will have 24 hr supervision/assist at d/c. Cognition        Objective     Observation/Palpation  Observation: Redness noted to R inner and upper thigh. Ostomy present    AROM RLE (degrees)  RLE AROM: WFL  AROM LLE (degrees)  LLE AROM : WFL  Strength RLE  R Hip Flexion: 4+/5  R Knee Flexion: 5/5  R Knee Extension: 4+/5  R Ankle Dorsiflexion: 4+/5  Strength LLE  L Hip Flexion: 3+/5  L Knee Flexion: 3+/5  L Knee Extension: 3+/5  L Ankle Dorsiflexion: 3+/5     Sensation  Overall Sensation Status: Impaired (intact to light touch over all RLE dermatomes, pt reports numbness to LLE)  Bed mobility  Rolling to Left: Minimal assistance  Rolling to Right: Minimal assistance  Supine to Sit: Moderate assistance (x2 trials, assist for LLE and trunk placement)  Sit to Supine: Contact guard assistance  Comment: HOB flat, no use of bedrails, cues for sequencing  Transfers  Sit to Stand: Dependent/Total (MaxAx2-3 depending on height of surface, from EOB, w/c, rolling shower chair, and gary steady to no AD or gary steady, L knee blocking required, pt with minimal initiation noted, facilitation for anterior weight shift and glute extension)  Stand to sit: Dependent/Total (MaxAx2-3, cues for sequencing, poor eccentric control)  Bed to Chair: Dependent/Total (MaxAx2 (toward pt's L due to set up) from EOB to w/c, L knee blocking, cues for sequencing, minimal initiation noted)  Squat Pivot Transfers: Dependent/Total (MaxAx3 attempt from w/c to toilet, MaxAx2 from w/c to rolling shower chair)  Car Transfer:  (unsafe to attempt)  Comment: PT/OT attempting to lead pt in squat pivot transfer w/c to elevated toilet (to pt's L due to set-up). Pt able to perform partial squat pivot, however pt's LLE in extensor tone upon initiating second half of transfer and pt began to slide forward. B knee blocking required to prevent further sliding. RN notified and provided assist of a third person to reposition pt in w/c. Pt was transferred via squat pivot to rolling shower chair to perform toileting and bathing. Partial stand with MaxAx2 from rolling shower chair to no AD to remove pt's brief.   After showering, attempted to perform partial stand from rolling shower chair to pull up underwear and pants, however pt with minimal initiation noted and unable to clear bottom from surface with MaxAx2 to complete dressing. Attempted to use MaxAx2 to stand from rolling shower chair to gary steady, however pt again unable to clear bottom. Successful stand from rolling shower chair with MaxX3 to gary steady to complete dressing. Ambulation  Ambulation?:  (unsafe to attempt)  Stairs/Curb  Stairs? :  (unsafe to attempt)     Balance  Comments: SBA for sitting balance on rolling shower chair to perform bathing tasks. MaxAx2 to maintain partial stand for LB dressing. Pt noted to have slight L lateral lean while seated in w/c and on gary steady. See OT note for bathing and dressing comments. Plan   Plan  Times per week: 5x/week, 75 minutes a day  Current Treatment Recommendations: Strengthening, Neuromuscular Re-education, Home Exercise Program, ROM, Safety Education & Training, Balance Training, Endurance Training, Patient/Caregiver Education & Training, Functional Mobility Training, Wheelchair Mobility Training, Equipment Evaluation, Education, & procurement, Transfer Training, Gait Training  Safety Devices  Type of devices:  All fall risk precautions in place, Call light within reach, Chair alarm in place, Gait belt, Left in chair, Nurse notified    G-Code       OutComes Score                                                  AM-PAC Score             Goals  Short term goals  Time Frame for Short term goals: 2 weeks  Short term goal 1: Pt will perform all bed mobility with Osei  Short term goal 2: Pt will perform squat pivot with MaxA  Short term goal 3: Pt will perform sit to/from stand with LRAD and MaxA  Short term goal 4: Pt will propel w/c 150' with supervision  Long term goals  Time Frame for Long term goals : 3-4 weeks  Long term goal 1: Pt will perform all bed mobility with supervision  Long term goal 2: Pt

## 2021-05-22 NOTE — PLAN OF CARE
Problem: Nutrition  Goal: Optimal nutrition therapy  Outcome: Ongoing   Nutrition Problem #1: Predicted inadequate energy intake  Intervention: Food and/or Nutrient Delivery: Continue Current Diet, Start Oral Nutrition Supplement  Nutritional Goals: Pt will meet >50% of nutritional needs this admission

## 2021-05-23 LAB
BACTERIA: ABNORMAL /HPF
BILIRUBIN URINE: NEGATIVE
BLOOD, URINE: ABNORMAL
CLARITY: ABNORMAL
COLOR: YELLOW
EPITHELIAL CELLS, UA: ABNORMAL /HPF (ref 0–5)
GLUCOSE URINE: NEGATIVE MG/DL
KETONES, URINE: NEGATIVE MG/DL
LEUKOCYTE ESTERASE, URINE: ABNORMAL
MICROSCOPIC EXAMINATION: YES
NITRITE, URINE: NEGATIVE
PH UA: 7.5 (ref 5–8)
PROTEIN UA: NEGATIVE MG/DL
RBC UA: ABNORMAL /HPF (ref 0–4)
SPECIFIC GRAVITY UA: 1.01 (ref 1–1.03)
URINE REFLEX TO CULTURE: YES
URINE TYPE: ABNORMAL
UROBILINOGEN, URINE: 0.2 E.U./DL
WBC UA: ABNORMAL /HPF (ref 0–5)

## 2021-05-23 PROCEDURE — 87077 CULTURE AEROBIC IDENTIFY: CPT

## 2021-05-23 PROCEDURE — 6360000002 HC RX W HCPCS: Performed by: PHYSICAL MEDICINE & REHABILITATION

## 2021-05-23 PROCEDURE — 1280000000 HC REHAB R&B

## 2021-05-23 PROCEDURE — 6370000000 HC RX 637 (ALT 250 FOR IP): Performed by: PHYSICAL MEDICINE & REHABILITATION

## 2021-05-23 PROCEDURE — 87186 SC STD MICRODIL/AGAR DIL: CPT

## 2021-05-23 PROCEDURE — 81001 URINALYSIS AUTO W/SCOPE: CPT

## 2021-05-23 PROCEDURE — 87086 URINE CULTURE/COLONY COUNT: CPT

## 2021-05-23 RX ORDER — NITROFURANTOIN 25; 75 MG/1; MG/1
100 CAPSULE ORAL EVERY 12 HOURS SCHEDULED
Status: DISCONTINUED | OUTPATIENT
Start: 2021-05-23 | End: 2021-05-24

## 2021-05-23 RX ADMIN — CITALOPRAM HYDROBROMIDE 20 MG: 20 TABLET ORAL at 09:07

## 2021-05-23 RX ADMIN — ENOXAPARIN SODIUM 40 MG: 40 INJECTION SUBCUTANEOUS at 09:07

## 2021-05-23 RX ADMIN — PANTOPRAZOLE SODIUM 40 MG: 40 TABLET, DELAYED RELEASE ORAL at 06:21

## 2021-05-23 RX ADMIN — ASPIRIN 81 MG: 81 TABLET, CHEWABLE ORAL at 09:06

## 2021-05-23 RX ADMIN — FUROSEMIDE 20 MG: 20 TABLET ORAL at 09:07

## 2021-05-23 RX ADMIN — MAGNESIUM OXIDE TAB 400 MG (240 MG ELEMENTAL MG) 400 MG: 400 (240 MG) TAB at 20:59

## 2021-05-23 RX ADMIN — NITROFURANTOIN MONOHYDRATE/MACROCRYSTALLINE 100 MG: 25; 75 CAPSULE ORAL at 23:04

## 2021-05-23 RX ADMIN — CARVEDILOL 25 MG: 12.5 TABLET, FILM COATED ORAL at 09:06

## 2021-05-23 RX ADMIN — CARVEDILOL 25 MG: 12.5 TABLET, FILM COATED ORAL at 17:22

## 2021-05-23 RX ADMIN — PREDNISONE 7.5 MG: 5 TABLET ORAL at 09:07

## 2021-05-23 RX ADMIN — LEVOTHYROXINE SODIUM 50 MCG: 0.05 TABLET ORAL at 06:21

## 2021-05-23 RX ADMIN — LISINOPRIL 40 MG: 40 TABLET ORAL at 09:06

## 2021-05-23 RX ADMIN — Medication 5 MG: at 20:59

## 2021-05-23 RX ADMIN — MAGNESIUM OXIDE TAB 400 MG (240 MG ELEMENTAL MG) 400 MG: 400 (240 MG) TAB at 12:24

## 2021-05-23 RX ADMIN — MAGNESIUM OXIDE TAB 400 MG (240 MG ELEMENTAL MG) 400 MG: 400 (240 MG) TAB at 09:07

## 2021-05-23 RX ADMIN — POTASSIUM CHLORIDE 20 MEQ: 20 TABLET, EXTENDED RELEASE ORAL at 09:07

## 2021-05-23 RX ADMIN — MAGNESIUM OXIDE TAB 400 MG (240 MG ELEMENTAL MG) 400 MG: 400 (240 MG) TAB at 17:22

## 2021-05-23 RX ADMIN — DOCUSATE SODIUM 50 MG AND SENNOSIDES 8.6 MG 2 TABLET: 8.6; 5 TABLET, FILM COATED ORAL at 09:08

## 2021-05-23 RX ADMIN — ATORVASTATIN CALCIUM 40 MG: 40 TABLET, FILM COATED ORAL at 20:59

## 2021-05-23 RX ADMIN — AMLODIPINE BESYLATE 5 MG: 5 TABLET ORAL at 09:06

## 2021-05-23 RX ADMIN — Medication 2000 UNITS: at 09:06

## 2021-05-23 ASSESSMENT — PAIN SCALES - GENERAL
PAINLEVEL_OUTOF10: 0
PAINLEVEL_OUTOF10: 0

## 2021-05-23 NOTE — PROGRESS NOTES
Pt up in chair denies any pain. All meds taken whole in pudding without difficulty. Pt assisted to bathroom via stedy and 3 staff members. Call light within reach and chair alarm engaged. For patient safety, Visual Surveillance is in place, reviewed with patient/family, verbalized understanding.   Vitals:    05/23/21 0902   BP: 136/82   Pulse: 62   Resp: 16   Temp: 97.8 °F (36.6 °C)   SpO2:

## 2021-05-23 NOTE — PLAN OF CARE
Problem: Falls - Risk of:  Goal: Will remain free from falls  Description: Will remain free from falls  Outcome: Met This Shift  Note: Pt remains free of falls thus far this shift. All fall precautions in place and functioning properly. For patient safety, Visual Surveillance is in place, reviewed with patient/family, verbalized understanding. Problem: Nutrition  Goal: Optimal nutrition therapy  Outcome: Met This Shift  Note:   Pt eating % of every meal so far this shift. Problem: Skin Integrity:  Goal: Will show no infection signs and symptoms  Description: Will show no infection signs and symptoms  Outcome: Ongoing  Note: Patient offered to be toileted every two hours and PRN, skin barrier applied as needed. Staff assists patient with repositioning and pillow support is provided when needed. Patient educated on offloading techniques and verbalizes understanding.

## 2021-05-23 NOTE — PLAN OF CARE
Problem: Falls - Risk of:  Goal: Will remain free from falls  Description: Will remain free from falls  5/23/2021 0209 by Malina Jenkins RN  Outcome: Ongoing  Note: Patient is a fall risk. Patient is x 3 gary steady. See Fall Risk assessment for details. Bed is in low, lock position; call light/belongings within reach. No attempts to get out of bed have been made, calls appropriately when assistance is needed. Bed alarm and hourly rounds in place for safety. Problem: Falls - Risk of:  Goal: Absence of physical injury  Description: Absence of physical injury  Outcome: Ongoing     Problem: Skin Integrity:  Goal: Will show no infection signs and symptoms  Description: Will show no infection signs and symptoms  5/23/2021 0209 by Malina Jenkins RN  Outcome: Ongoing  Note: Patient has scattered abrasions, redness to the marilyn area, which a barrier cream is being put on, and has a colostomy. No signs of infection are noted on this shift.       Problem: Skin Integrity:  Goal: Absence of new skin breakdown  Description: Absence of new skin breakdown  5/23/2021 0209 by Malina Jenkins RN  Outcome: Ongoing     Problem: Nutrition  Goal: Optimal nutrition therapy  5/23/2021 0209 by Malina Jenkins RN  Outcome: Ongoing

## 2021-05-23 NOTE — PROGRESS NOTES
Patient is in bed and resting at this time, vitals stable but BP was elevated 152/66, no pain at this time. Patient has a colostomy on upper L quadrant, has been incontinent of urine. Call light with in reach and bed alarm on.

## 2021-05-24 LAB
ANION GAP SERPL CALCULATED.3IONS-SCNC: 8 MMOL/L (ref 3–16)
BUN BLDV-MCNC: 6 MG/DL (ref 7–20)
CALCIUM SERPL-MCNC: 9.2 MG/DL (ref 8.3–10.6)
CHLORIDE BLD-SCNC: 102 MMOL/L (ref 99–110)
CO2: 28 MMOL/L (ref 21–32)
CREAT SERPL-MCNC: <0.5 MG/DL (ref 0.6–1.2)
GFR AFRICAN AMERICAN: >60
GFR NON-AFRICAN AMERICAN: >60
GLUCOSE BLD-MCNC: 96 MG/DL (ref 70–99)
HCT VFR BLD CALC: 33 % (ref 36–48)
HEMOGLOBIN: 11.5 G/DL (ref 12–16)
MAGNESIUM: 1.7 MG/DL (ref 1.8–2.4)
MCH RBC QN AUTO: 31.5 PG (ref 26–34)
MCHC RBC AUTO-ENTMCNC: 34.9 G/DL (ref 31–36)
MCV RBC AUTO: 90.1 FL (ref 80–100)
PDW BLD-RTO: 14.7 % (ref 12.4–15.4)
PLATELET # BLD: 169 K/UL (ref 135–450)
PMV BLD AUTO: 7.4 FL (ref 5–10.5)
POTASSIUM SERPL-SCNC: 3.5 MMOL/L (ref 3.5–5.1)
RBC # BLD: 3.66 M/UL (ref 4–5.2)
SODIUM BLD-SCNC: 138 MMOL/L (ref 136–145)
WBC # BLD: 4.7 K/UL (ref 4–11)

## 2021-05-24 PROCEDURE — 1280000000 HC REHAB R&B

## 2021-05-24 PROCEDURE — 6360000002 HC RX W HCPCS: Performed by: PHYSICAL MEDICINE & REHABILITATION

## 2021-05-24 PROCEDURE — 97530 THERAPEUTIC ACTIVITIES: CPT

## 2021-05-24 PROCEDURE — 97530 THERAPEUTIC ACTIVITIES: CPT | Performed by: PHYSICAL THERAPIST

## 2021-05-24 PROCEDURE — 97129 THER IVNTJ 1ST 15 MIN: CPT

## 2021-05-24 PROCEDURE — 99232 SBSQ HOSP IP/OBS MODERATE 35: CPT | Performed by: PHYSICAL MEDICINE & REHABILITATION

## 2021-05-24 PROCEDURE — 83735 ASSAY OF MAGNESIUM: CPT

## 2021-05-24 PROCEDURE — 80048 BASIC METABOLIC PNL TOTAL CA: CPT

## 2021-05-24 PROCEDURE — 97542 WHEELCHAIR MNGMENT TRAINING: CPT | Performed by: PHYSICAL THERAPIST

## 2021-05-24 PROCEDURE — 97130 THER IVNTJ EA ADDL 15 MIN: CPT

## 2021-05-24 PROCEDURE — 85027 COMPLETE CBC AUTOMATED: CPT

## 2021-05-24 PROCEDURE — 97112 NEUROMUSCULAR REEDUCATION: CPT

## 2021-05-24 PROCEDURE — 36415 COLL VENOUS BLD VENIPUNCTURE: CPT

## 2021-05-24 PROCEDURE — 6370000000 HC RX 637 (ALT 250 FOR IP): Performed by: PHYSICAL MEDICINE & REHABILITATION

## 2021-05-24 RX ORDER — AMOXICILLIN AND CLAVULANATE POTASSIUM 875; 125 MG/1; MG/1
1 TABLET, FILM COATED ORAL EVERY 12 HOURS SCHEDULED
Status: DISCONTINUED | OUTPATIENT
Start: 2021-05-24 | End: 2021-06-01

## 2021-05-24 RX ADMIN — LEVOTHYROXINE SODIUM 50 MCG: 0.05 TABLET ORAL at 06:21

## 2021-05-24 RX ADMIN — NITROFURANTOIN MONOHYDRATE/MACROCRYSTALLINE 100 MG: 25; 75 CAPSULE ORAL at 08:04

## 2021-05-24 RX ADMIN — PANTOPRAZOLE SODIUM 40 MG: 40 TABLET, DELAYED RELEASE ORAL at 06:21

## 2021-05-24 RX ADMIN — POLYETHYLENE GLYCOL 3350 17 G: 17 POWDER, FOR SOLUTION ORAL at 08:03

## 2021-05-24 RX ADMIN — MAGNESIUM OXIDE TAB 400 MG (240 MG ELEMENTAL MG) 400 MG: 400 (240 MG) TAB at 08:01

## 2021-05-24 RX ADMIN — MAGNESIUM OXIDE TAB 400 MG (240 MG ELEMENTAL MG) 400 MG: 400 (240 MG) TAB at 12:53

## 2021-05-24 RX ADMIN — ENOXAPARIN SODIUM 40 MG: 40 INJECTION SUBCUTANEOUS at 08:04

## 2021-05-24 RX ADMIN — DOCUSATE SODIUM 50 MG AND SENNOSIDES 8.6 MG 2 TABLET: 8.6; 5 TABLET, FILM COATED ORAL at 21:56

## 2021-05-24 RX ADMIN — PREDNISONE 7.5 MG: 5 TABLET ORAL at 08:02

## 2021-05-24 RX ADMIN — MAGNESIUM OXIDE TAB 400 MG (240 MG ELEMENTAL MG) 400 MG: 400 (240 MG) TAB at 21:56

## 2021-05-24 RX ADMIN — ATORVASTATIN CALCIUM 40 MG: 40 TABLET, FILM COATED ORAL at 21:57

## 2021-05-24 RX ADMIN — LISINOPRIL 40 MG: 40 TABLET ORAL at 08:02

## 2021-05-24 RX ADMIN — ASPIRIN 81 MG: 81 TABLET, CHEWABLE ORAL at 08:02

## 2021-05-24 RX ADMIN — FUROSEMIDE 20 MG: 20 TABLET ORAL at 08:02

## 2021-05-24 RX ADMIN — CITALOPRAM HYDROBROMIDE 20 MG: 20 TABLET ORAL at 08:02

## 2021-05-24 RX ADMIN — POTASSIUM CHLORIDE 20 MEQ: 20 TABLET, EXTENDED RELEASE ORAL at 08:01

## 2021-05-24 RX ADMIN — CARVEDILOL 25 MG: 12.5 TABLET, FILM COATED ORAL at 08:03

## 2021-05-24 RX ADMIN — MAGNESIUM OXIDE TAB 400 MG (240 MG ELEMENTAL MG) 400 MG: 400 (240 MG) TAB at 16:03

## 2021-05-24 RX ADMIN — Medication 5 MG: at 21:56

## 2021-05-24 RX ADMIN — CARVEDILOL 25 MG: 12.5 TABLET, FILM COATED ORAL at 16:03

## 2021-05-24 RX ADMIN — Medication 2000 UNITS: at 08:01

## 2021-05-24 RX ADMIN — AMLODIPINE BESYLATE 5 MG: 5 TABLET ORAL at 08:02

## 2021-05-24 ASSESSMENT — PAIN SCALES - GENERAL
PAINLEVEL_OUTOF10: 0

## 2021-05-24 NOTE — CARE COORDINATION
Case Management Assessment           Initial Evaluation                Date / Time of Evaluation: 5/24/2021 10:14 AM                 Assessment Completed by: ISSA [place]    Patient Name: Benson Urbina     YOB: 1945  Diagnosis: 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, unspecified [facility]) [I61.2]     Date / Time: 5/21/2021  5:58 PM    Patient Admission Status: Inpatient    If patient is discharged prior to next notation, then this note serves as note for discharge by case management. Current PCP: No primary care provider on file. Clinic Patient: NO     Chart Reviewed: Yes  Patient/ Family Interviewed: Yes    Initial assessment completed at bedside with: Patient at bedside this AM    Hospitalization in the last 30 days: No    Emergency Contacts:  No emergency contact information on file. Advance Directives:   Code Status: Full Code     Financial:  Payor: MEDICARE / Plan: MEDICARE PART A AND B / Product Type: *No Product type* /     Pre-cert required for SNF: No    Pharmacy:    [address]  [address] [place] [address]  Phone: [phone] Fax: [phone]      Potential assistance Purchasing Medications: Potential Assistance Purchasing Medications: No  Does Patient want to participate in local refill/ meds to beds program?: No    Meds To Beds General Rules:  1. Can ONLY be done Monday- Friday between 8:30am-5pm  2. Prescription(s) must be in pharmacy by 3pm to be filled same day  3. Copy of patient's insurance/ prescription drug card and patient face sheet must be sent along with the prescription(s)  4. Cost of Rx cannot be added to hospital bill. If financial assistance is needed, please contact unit  or ;   or  CANNOT provide pharmacy voucher for patients co-pays  5. Patients can then  the prescription on their way out of the hospital at discharge, or pharmacy can deliver to the bedside if staff is available. (payment due at time of pick-up or delivery - cash, check, or card accepted)     Able to afford home medications/ co-pay costs: Yes    ADLS:  Support Systems: Spouse/Significant Other, Children    PT AM-PAC:   /24  OT AM-PAC:   /24    Housing:  Home Environment: From home independent at baseline. Support from multiple family   Steps: yes     Plans to RETURN to current housing: Yes  Barrier(s) to RETURNING to current housing: Medical Clearance     Home Care Information:  Currently ACTIVE with Home Health Care: No  Home Care Agency: Not Applicable    Currently ACTIVE with [place] on Aging: No    Durable Medical Equipment:  DME Provider: None   Equipment: Patient denied using any medical equipment prior to admission. Home Oxygen and Respiratory Equipment:  Has HOME OXYGEN prior to admission: No  Oxygen Company: Not Applicable    Dialysis:  Active with HD/PD prior to admission: No    DISCHARGE PLAN:  Disposition: Plan for home with possible home health care services. Transportation PLAN for discharge: family     Factors facilitating achievement of predicted outcomes: Family support, Motivated, Cooperative, Pleasant and Sense of humor    Barriers to discharge: pending improvement, DME recommendations and home set up. Additional Case Management Notes:   SW met with patient at bedside on this date. Patient admitted to [place] [address] after stay at [facility]. Patient suffered a intracerebral hemorrhage in 1/2021. Patient is eager to work with rehab. Her goal is to return back to her home. She was completely independent prior to her hemorrhage. Patient is hoping with continued progress to be able to use only a rolling walker at discharge. She denied using any DME prior to onset.      Patient stated her two daughter work in the healthcare field. She is open to home health care and DME recommendations at discharge. SW will continue to follow. The Plan for Transition of Care is related to the following treatment goals 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, unspecified ([facility] [address] [I61.2]      The Patient and/or patient representative was provided with a choice of provider and agrees with the discharge plan Yes    Freedom of choice list was provided with basic dialogue that supports the patient's individualized plan of care/goals and shares the quality data associated with the providers.  Yes    Care Transition patient: No    ISSA Domingo  [facility] [address] [facility]   Case Management Department  Ph: [phone]

## 2021-05-24 NOTE — PROGRESS NOTES
Occupational Therapy  Facility/Department: [facility] ACUTE REHAB UNIT  Daily Treatment Note  NAME: Domo Monzon  : 1945  MRN: [id number]    Date of Service: 2021    Discharge Recommendations:  Home with Home health OT, Home with nursing aide, 24 hour supervision or assist, Continue to assess pending progress  OT Equipment Recommendations  Equipment Needed: Yes  Mobility Devices: ADL Assistive Devices  ADL Assistive Devices: Shower Chair with back  Other: Continue to assess for needs    Assessment   Performance deficits / Impairments: Decreased functional mobility ; Decreased cognition;Decreased ADL status; Decreased endurance;Decreased fine motor control;Decreased ROM; Decreased sensation;Decreased coordination;Decreased strength;Decreased balance;Decreased posture;Decreased safe awareness  Assessment: Pt demonstrated great participation in OT today as evident by pt ability to self propel w/c over 150 ft w/ spvn. Pt engaged in functional reaching task w/ LUE w/ ability to achieve 90 degrees shoulder flexion however w/ decreased control noted at times due to proprioception deficits. Pt completed squat pivot transfers w/ 2 person assist today and slide board transfers w/ improved success noted w/ slide board vs squat pivot. Pt is highly motivated and benefits from ongoing OT to maximize functional independence. Cont OT per POC  Treatment Diagnosis: Decreased ADL status and decreased ROM 2/2 CVA    OT Education: Transfer Training  Patient Education: seated balance at EOM- pt verb understanding  REQUIRES OT FOLLOW UP: Yes  Activity Tolerance  Activity Tolerance: Patient Tolerated treatment well  Safety Devices  Safety Devices in place: Yes  Type of devices: Call light within reach; Chair alarm in place; Left in chair;Nurse notified         Patient Diagnosis(es): There were no encounter diagnoses.       has a past medical history of Arthritis, CAD (coronary artery disease), Cancer ([facility] [address] Cerebral artery occlusion with cerebral infarction [facility]), CHF (congestive heart failure) ([facility] [address] Hypertension, and Thyroid disease. has a past surgical history that includes Abdomen surgery; Hysterectomy; Colonoscopy; hernia repair; and pacemaker placement. Restrictions  Position Activity Restriction  Other position/activity restrictions: fall precautions  Subjective   General  Chart Reviewed: Yes  Patient assessed for rehabilitation services?: Yes  Additional Pertinent Hx: Per MD H and P:Patient is a 66 yo female who originally admitted to [facility] /23/21-2/9/21 for Right intracerebral hemorrhage with L hemiparesis. She has a know past medical hx of systolic CHF (EF 42%), A Fib (on warfarin), ICD, HTN, Hypothyroidism, rectal cancer s/p resection, and ITP. Her hemorrhage became worse with a midline shift and decline in her neurologic status. Patient was started on seizure prophalxis, and neurosurgery followed, but no indication for surgery. She was also treated for UTI, anti-hypertensives adjusted to maintain bp in normal range  with ICH. She failed MBS and GI consulted who recommended PEG. She was also treated with zosyn for possible aspiration pneumonia. S/P PEG placed on 2/6/21. EGD recommended PPI therapy. She remained stable and was discharged to [facility]. Patient presented to SNF on 2/9/21. She improved with her swallowing and tolerated a regular diet,  and GI removed PEG on 5/18/21. Pt had been at the [facility] since February and has now exhausted her days left at the SNF. Pt admitted to [facility] 5/21  Family / Caregiver Present: No  Referring Practitioner: Dr. Tanvir Martinez  Diagnosis: CVA  Subjective  Subjective: Pt was seated in w/c upon arrival. Pt was pleasant and agreeable to OT/PT. Co treat indicated to maximize functional independence.     Vital Signs  Patient Currently in Pain: Denies   Orientation     Objective             Balance  Sitting Balance: Contact guard assistance (CGA-SBA seated EOM)  Functional Mobility  Functional - Mobility Device: Wheelchair  Activity: Other (from gym to room ~150 ft)  Assist Level: Supervision  Functional Mobility Comments: Pt self propelled ~150 ft w/ spvn. Pt was able to use BLEs to propel w/c and BUEs to assist w/ steering. Pt required min VCs for increased step length LLE. Pt was placed in a 18'' cong height w/c which apppeared to be the best fit for pt. A folded sheet was placed under L hip to promote midline orientation and improved seated posture as pt has tendency to lean to the L       Transfers  Sit Pivot Transfers: 2 Person assistance;Dependent/Total (Squat pivot transfers completed from w/c <> EOM and w/c <> EOB. See below for more details)  Sit to stand: Maximum assistance (Partial stance completed from EOM to place wedge under buttocks. Pt had increased difficulty clearing her buttocks from the mat table but was able to clear enough for placement of wedge.)  Transfer Comments: Pt completed a squat pivot transfer from w/c > EOM w/ Max Ax1 + CGA x1 (leading to the R). OT assisted w/ positioning hips while PT facilitiated anterior weight shift and forward momentum. Pt had increased difficulty w/ generating force through BLEs to assist and required VCs for proper body mechanics for successful transfer. Pt completed squat pivot from EOM > w/c w/ Max Ax1 + Min Ax1 (leading to the L). Squat pivot transfer attempted from w/c > elevated EOB w/ max A x2. This transfer was unsuccessful however as pt only made it onto the bed about MCC. Since this was deemed unsafe PT and OT assisted pt back into w/c. Due to elevated height of bed and increased difficulty w/ transfers at this time OT and PT recommended use of maxi move until a better transfer technique is achieved. Neuromuscular Education  Neuromuscular education: Yes  NDT Treatment: Sitting  Trunk Control: Pt engaged in seated dynamic balance and functional reaching tasks at EOM to promote improved trunk control.  Pt was assisted onto a wedge to promote WB through BLEs and pelvic trunk dissociation. Pt sat on wedge surface for ~15 mins while reaching across midline and outside JHOAN anteriorly w/ LUE to retrieve bean bags. Upon retrieval of bean bag pt placed laterally and stacked bean bags. Pt reached to ~90 degrees shoulder flexion w/ LUE to retrieve bags w/ fair grasp of bean bag noted. Pt had increased difficulty w/ releasing bag laterally onto the mat as this required pt to rotate at her trunk signifcantly to release onto desired target. Pt appeared to have decreased vision on the L and required VCs to turn her head completely to see the target. Pt also demonstrated decreased control of LUE upon releasing canseco bag onto target. Pt was unaware that after releasing bag her LUE was \"dragging\" resulting in pt knocking over stacked bean bags. Pt required VCs to watch what her LUE was doing and to fully lift LUE off of bean bag before removing it from the target. Pt's control improved significantly when she was watching her LUE. Pt engaged in task w/ CGA-SBA and min TCs provided at [facility]. Pt's RLE was audibly \"cracking\" during session upon palpation. Pt stated this is due to her arthritis which is often very painful. Cognition  Arousal/Alertness: Appropriate responses to stimuli  Following Commands: Follows multistep commands with increased time; Follows multistep commands with repitition  Attention Span: Appears intact  Memory: Decreased recall of recent events  Safety Judgement: Good awareness of safety precautions  Problem Solving: Assistance required to generate solutions  Insights: Fully aware of deficits  Initiation: Requires cues for some  Sequencing: Requires cues for some       Perception  Unilateral Attention: Cues to attend left visual field;Cues to attend to left side of body           Upper Extremity Function  NDT Treatment: Sitting                 2nd session: Pt was semi supine in bed upon arrival. Pt was pleasant and highly motivated and agreeable to OT/PT co treat. Co treat indicated to maximize functional independence. Bed mobility:  Supine > sit- Min A. HOB flat + use of bed rail. VCs needed for BLE advancement. Sit > supine- Mod A needed to lift BLEs into bed. Functional transfers:  Slide board- slide board transfers practice this session as pt reported this was how she was transferring at the [facility]. Pt completed slide board transfers 3x from EOB <> w/c. Pt completed transfer from EOB > w/c w/ Mod A x1 + CGA x1. CGA was provided to prevent slide board from tipping. Transfer completed from w/c up to elevated EOB w/ Mod Ax1 + Min Ax1. Pt required VCs for BUE placement during transfer and for proper mechanics. Assistance required to block LLE to prevent from sliding. Pt was very pleased w/ this transfer. RN was notified and present during session for transfer training. KARISHMA May assisted w/ transfer to/from w/ to understand proper techniques. OT educated RN that [facility] may be the best option at this time if using the slide board. OT suggested pants removal in bed before transferring to [facility] or weight shifting laterally L and R once on BSC to remove clothes. OT suggested rosalinda pad under pt's buttocks to prevent skin tear if pt is transferring to [facility] w/ pants down. RN verb understanding of education. Pt was left in bed at EOS w/ bed alarm on and call light within reach.             Plan   Plan  Times per week: 5x a week for 75 mins daily  Times per day: Daily  Current Treatment Recommendations: Strengthening, Wheelchair Mobility Training, Positioning, ROM, Safety Education & Training, Balance Training, Patient/Caregiver Education & Training, Self-Care / ADL, Cognitive/Perceptual Training, Functional Mobility Training, Neuromuscular Re-education, Equipment Evaluation, Education, & procurement, Home Management Training, Endurance Training  G-Code     OutComes Score                                                  AM-PAC Score Goals  Short term goals  Time Frame for Short term goals: 2 weeks  Short term goal 1: Pt will complete toilet transfer w/ Mod A-ongoing  Short term goal 2: Pt will complete LE dressing supine in bed w/ Min A-ongoing  Short term goal 3: Pt will complete UE dressing w/ Min A-ongoing  Short term goal 4: Pt will complete bathing tasks w/ Min A-ongoing  Short term goal 5: Pt will demonstrate improved functional use of LUE as evident by pt ability to open grooming containers I'ly-ongoing  Long term goals  Time Frame for Long term goals : 4 weeks- all goals ongoing  Long term goal 1: Pt will complete toilet transfer w/ CGA  Long term goal 2: Pt will complete toileting tasks w/ CGA  Long term goal 3: Pt will complete LE dressing (either supine or bed or seated ) w/ CGA  Long term goal 4: Pt will complete UE dressing w/ setup and spvn  Long term goal 5: Pt will complete bathing tasks w/ SBA  Patient Goals   Patient goals : \"be able to walk\"       Therapy Time   Individual Concurrent Group Co-treatment   Time In       0830   Time Out       0930   Minutes       60   Timed Code Treatment Minutes: 60 Minutes       Second Session Therapy Time:   Individual Concurrent Group Co-treatment   Time In        9502   Time Out        1420   Minutes        35     Timed Code Treatment Minutes:  60+35= 95     Total Treatment Minutes:  [id number] Georges Goss Md, Dr, OT

## 2021-05-24 NOTE — PLAN OF CARE
Problem: Falls - Risk of:  Goal: Will remain free from falls  Description: Will remain free from falls  5/24/2021 1019 by Mery Xavier RN  Outcome: Ongoing  Note: Pt educated to use her call light when she needs assistance. Pt also educated not to get up unassisted at this time. Bed alarm on when Pt in bed and chair alarm on when Pt up in chair. Non skid socks on and call light placed within reach. RN will continue to monitor Pt.    5/24/2021 0222 by Jamilah Pinon RN  Outcome: Ongoing  Note: Patient is a fall risk. Patient is a x 3 gary steady with gait belt. See Fall Risk assessment for details. Bed is in low, lock position; call light/belongings within reach. No attempts to get out of bed have been made, calls appropriately when assistance is needed. Bed alarm and hourly rounds in place for safety.

## 2021-05-24 NOTE — PROGRESS NOTES
Department of Physical Medicine & Rehabilitation  Progress Note    Patient Identification:  Alonso Mckeon  [id number]  : 1945  Admit date: 2021    Chief Complaint: L hemiparesis due to R intracerebral hemorrhage    Subjective:   No acute events overnight. Patient seen this am. Colostomy bag with soft loose stool, senokot held this AM. Denies pain, tolerated diet. Currently functioning below baseline from [facility] 2/2/ CVA, requiring assist of 2-3 for all functional mobility. But pt remains highly motivated. ROS: No f/c, n/v, cp. Objective:  Patient Vitals for the past 24 hrs:   BP Temp Temp src Pulse Resp SpO2 Weight   21 0759 (!) 147/70   89      21 0711 (!) 163/78 97.5 °F (36.4 °C) Oral 75 17 94 %    21 0632       172 lb 13.5 oz (78.4 kg)   21 2049 135/82 97.8 °F (36.6 °C) Oral 73 16 93 %    21 1722 135/81   76        Const: Alert. No distress, pleasant. HEENT: Normocephalic, atraumatic. Normal sclera/conjunctiva. MMM. CV: Regular rate and rhythm. Resp: No respiratory distress. Lungs CTAB. Abd: Soft, nontender, nondistended, NABS+   Ext: No edema. Neuro: Alert, oriented, appropriately interactive. Left neglect noted. Motor examination reveals normal strength in right side, 3-/5 strength left side diffusely. Psych: Cooperative, appropriate mood and affect    Laboratory data: Available via EMR.    Last 24 hour lab  Recent Results (from the past 24 hour(s))   Basic Metabolic Panel    Collection Time: 21  6:55 AM   Result Value Ref Range    Sodium 138 136 - 145 mmol/L    Potassium 3.5 3.5 - 5.1 mmol/L    Chloride 102 99 - 110 mmol/L    CO2 28 21 - 32 mmol/L    Anion Gap 8 3 - 16    Glucose 96 70 - 99 mg/dL    BUN 6 (L) 7 - 20 mg/dL    CREATININE <0.5 (L) 0.6 - 1.2 mg/dL    GFR Non-African American >60 >60    GFR African American >60 >60    Calcium 9.2 8.3 - 10.6 mg/dL   CBC    Collection Time: 21  6:55 AM   Result Value Ref Range    WBC 4.7 4.0 - 11.0 K/uL    RBC 3.66 (L) 4.00 - 5.20 M/uL    Hemoglobin 11.5 (L) 12.0 - 16.0 g/dL    Hematocrit 33.0 (L) 36.0 - 48.0 %    MCV 90.1 80.0 - 100.0 fL    MCH 31.5 26.0 - 34.0 pg    MCHC 34.9 31.0 - 36.0 g/dL    RDW 14.7 12.4 - 15.4 %    Platelets 490 931 - 619 K/uL    MPV 7.4 5.0 - 10.5 fL   Magnesium    Collection Time: 05/24/21  6:55 AM   Result Value Ref Range    Magnesium 1.70 (L) 1.80 - 2.40 mg/dL       Therapy progress:  PT  Position Activity Restriction  Other position/activity restrictions: fall precautions  Objective     Sit to Stand: Dependent/Total (MaxAx2-3 depending on height of surface, from EOB, w/c, rolling shower chair, and gary steady to no AD or gary steady, L knee blocking required, pt with minimal initiation noted, facilitation for anterior weight shift and glute extension)  Stand to sit: Dependent/Total (MaxAx2-3, cues for sequencing, poor eccentric control)  Bed to Chair: Dependent/Total (MaxAx2 (toward pt's L due to set up) from EOB to w/c, L knee blocking, cues for sequencing, minimal initiation noted)     OT  PT Equipment Recommendations  Other: continue to assess, pt states that she owns a RW  Toilet - Technique: Sit pivot  Equipment Used:  (rolling shower chair)  Toilet Transfers Comments: Pt required Max Ax2 from w/c > rolling shower chair. Rolling shower chair was position to pt's R to go to pt's strong side. OT and PT first attempted transfer from w/c > RTS which was unsuccessful and pt required assist x3 to safely get pt back into w/c. Assessment        SLP          Body mass index is 31.61 kg/m². Assessment and Plan:  Right intracerebral hemorrhage with L hemiparesis  - Cont lipitor 40 mg daily, ASA 81 mg daily     UTI  UA: cloudy, large amoutn of LE, WBC 10-20, bacteria rare.   - Urine culture in process  - Cont macrobid 100 mg BID (started 5/23) for 5 days    Systolic CHF (EF 64%)  - Cont coreg 25 mg BID,Lasix 20 mg daily     A Fib with ICD pacemaker  - Cont ASA (had been on Coumadin)     HTN  - Cont norvasc 5 mg daily, lisinopril 40 mg daily     HLD  - Cont lipitor 40 mg daily     Hypothyroidism  - Cont synthroid 50 mcg daily     Hx of rectal cancer s/p resection  - Colostomy present, monitor output    PPx: protonix 40 mg daily, lovenox 40 mg daily     ITP- Monitor     Bowels: Schedule Miralax + Senna S. Follow bowel movements. Enema or suppository if needed.      Bladder: Check PVR x 3.   [facility] if PVR > 200ml or if any volume is > 500 ml.      Pain: Tylenol is ordered prn.        Rehab Progress: Improving  Anticipated Dispo: SNF  Services/DME: TBD  ELOS: SOLO Hess MD, PGY1      Loy Moore D.O. M.P.H  PM&R  5/24/2021  9:18 AM

## 2021-05-24 NOTE — PROGRESS NOTES
Patient is in bed and resting at this time, vitals stable, no pain at this time. Patient has a colostomy is having soft, loose stool, held the senokot, patient usually takes metamucil every other day. Call light with in reach and bed alarm on.

## 2021-05-24 NOTE — PROGRESS NOTES
Physical Therapy  Facility/Department: [facility] ACUTE REHAB UNIT  Daily Treatment Note  NAME: Ramu Urena  : 1945  MRN: [id number]    Date of Service: 2021    Discharge Recommendations:  24 hour supervision or assist, Home with Home health PT   PT Equipment Recommendations  Other: continue to assess, pt states that she owns a RW    Assessment   Assessment: Pt is well motivated and maximizes effort with therapy. Pt demo ability to propel w/c now that w/c has been changed to a cong height. Pt's transf are challenging and presently dependent 2/2 to decreased midline orientation, decreased B LE strength and visual deficits in which pt is apprehensive to ant WS enough to facilitate the transition. Pt is well below baseline and would benefit from cont therapy to maximize potential and increase functional obility towards Ind to allow for a safer d/c to home. Treatment Diagnosis: decreased functional mobility due to nontraumatic ICH  Decision Making: Medium Complexity  PT Education: General Safety; Energy Conservation;Transfer Training;Orientation; Adaptive Device Training;Functional Mobility Training  Activity Tolerance  Activity Tolerance: Patient limited by fatigue;Patient limited by endurance     Patient Diagnosis(es): There were no encounter diagnoses. has a past medical history of Arthritis, CAD (coronary artery disease), Cancer ([facility] [address] Cerebral artery occlusion with cerebral infarction [facility]), CHF (congestive heart failure) ([facility] [address] Hypertension, and Thyroid disease. has a past surgical history that includes Abdomen surgery; Hysterectomy; Colonoscopy; hernia repair; and pacemaker placement. Restrictions  Position Activity Restriction  Other position/activity restrictions: fall precautions  Subjective   General  Chart Reviewed: Yes  Additional Pertinent Hx: Pt is a 67 y.o. female admitted to ARU on 21 from SNF.   Per MD note, pt originally \"admitted to [address] [place] [address] from 21-21 for intracerebral hemorrhage with L hemiparesis. She originally presented to the ER with left sided weakness and bp was 191/161 so she was started on Cleviprex gtt. She has a known hx of systolic CHF (EF 63%), A Fib (on warfarin), ICD, HTN, Hypothyroidism, rectal cancer s/p resection, and ITP. \"  Pt d/c to SNF on 2/9/21 and made significant progress. She now admits to ARU. Family / Caregiver Present: No  Referring Practitioner: Clement Mcclain MD  Pain Screening  Patient Currently in Pain: Denies  Vital Signs  Patient Currently in Pain: Denies       Orientation  Orientation  Overall Orientation Status: Within Functional Limits  Cognition      Objective      Transfers  Sit to Stand: 2 Person Assistance;Maximum Assistance (Only partial stand obtained to modify w/c cushion. Pt unable to obtain a full stance)  Stand to sit: Maximum Assistance (Poor eccentric control with tendency to lean to L)  Bed to Chair: Dependent/Total (Performed a squat pivot with going from lower ( cong height w/c) > higher surface ( bed) req max A x2 leading with the L side. Bed> w/c dependent with going from higher >lower with leading with the R side.  Decreased clearance)  Squat Pivot Transfers: 2 Person Assistance;Dependent/Total (w/c > mat table ( lateral surface with R side leading req Max A x1 and Min A x1 ) Leading with L side mat > w/c req Max A x1 and CGA of another)  Ambulation  Ambulation?: No  WB Status: Pt is not safe for ambulation at this time)  Stairs/Curb  Stairs?: No  Wheelchair Activities  Wheelchair Size: Initiated therapy in a 22\" standard  w/c but transitioned to a 20\" cong height w/c  Wheelchair Cushion: Pressure Relieving  Level of Assistance for pressure relief activities: Supervision  Wheelchair Parts Management: Yes  Left Brakes Level of Assistance: Minimal assistance (VC to locate and fully engage the brake with physical A to manage the brake)  Right Brakes Level of Assistance: Supervision (VC to locate and fully engage the brake,)  Propulsion: Yes  Propulsion 1  Propulsion: Manual  Level: Level Tile  Method: RLE;LLE  Level of Assistance: Supervision  Description/ Details: Used BLES in a stepping pattern but req VC for positioning of LLE especially as pt fatigued and increased delay on moving LLE  Distance: 80'     Balance  Comments: PT/ OT worked collaborativeley with pt with sitting on EOM . Pt demo ext at hips when attempting to maneuver laterally and posterior without any pelvic trunk dissociation resulting in pt \"sliding forward. For this reason, therapy placed a wedge under pt's hips to increase hip ext with increased WB through LES. This  facilitated pt's  increased pelvic trunk movements including rotation. Therapy instructed pt with reaching in all directions ( mostly with the LUE and crossing midline( Pt's tendency is to shorten L trunk ) Pt alicia reaching / retriving an object and maintaining sitting balance with min A Pt's midline is shifted. Comment: PT/ OT initiated an inservice for transf training to nsg. However, pt transf from w/c > bed ( low surface to high surface did not appear as safe as initially anticipated. For this reason, therapy recommended to nsg staff to use maxi move until otherwise noticed. Nsg present and in agreement. Second Session: Pt supine in bed upon arrival. Pt alert and agreeable to therapy. Pt cotx this session due to need of 2 skilled therapist to safely perform mobility. Pt supine to sit with min A for LLE management and trunk elevation . Cues for hand placement and sequencing. Pt transferred EOB > WC and WC> EOB x 3 reps with slide board . Pt mod A of 1 + CGA of 1 for bed to WC and mod A of 1 + Lorena  Of 1 WC > bed. Pt LL blocked by therapist and cues for UE placement and anterior lean. RN educated on how to safely perform transfer and performed transfer with assist of therapist for 2nd attempt. Pt sit to supine with mod A for LE management.  Pt left supine in bed with all needs in reach, alarm on , and RN aware.         G-Code     OutComes Score                                                     AM-PAC Score             Goals  Short term goals  Time Frame for Short term goals: 2 weeks  Short term goal 1: Pt will perform all bed mobility with Osei. Ongoing  Short term goal 2: Pt will perform squat pivot with MaxA. Ongoing  Short term goal 3: Pt will perform sit to/from stand with LRAD and MaxA. Ongoing  Short term goal 4: Pt will propel w/c 150' with supervision. Ongoing  Long term goals  Time Frame for Long term goals : 3-4 weeks  Long term goal 1: Pt will perform all bed mobility with supervision. Ongoing  Long term goal 2: Pt will perform squat pivot with CGA. Ongoing  Long term goal 3: Pt will perform sit to/from stand with LRAD and Osei. Ongoing  Long term goal 4: Pt will propel w/c 150' with Baltazar. Ongoing  Patient Goals   Patient goals : \"I want to walk again. \"    Plan    Plan  Times per week: 5x/week, 75 minutes a day  Current Treatment Recommendations: Strengthening, Neuromuscular Re-education, Home Exercise Program, ROM, Safety Education & Training, Balance Training, Endurance Training, Patient/Caregiver Education & Training, Functional Mobility Training, Wheelchair Mobility Training, Equipment Evaluation, Education, & procurement, Transfer Training, Gait Training  Safety Devices  Type of devices:  All fall risk precautions in place, Call light within reach, Chair alarm in place, Gait belt, Left in chair, Nurse notified (THerapy built up the L side of w/c cushion to facilitate symmetrical sitting)     Therapy Time   Individual Concurrent Group Co-treatment   Time In       0830   Time Out       0930   Minutes       Sixto Zepeda PT     Second Session Therapy Time:    Individual Concurrent Group Co-treatment   Time In      1345   Time Out      1420   Minutes      35      Timed Code Treatment Minutes:  60+35     Total Treatment Minutes:  95  Second session documentation only Anna Brannon, PT, DPT

## 2021-05-24 NOTE — PROGRESS NOTES
ACUTE REHAB UNIT  SPEECH/LANGUAGE PATHOLOGY      [x] Daily  [] Weekly Care Conference Note  [] Discharge    Patient: Everton Hodge      :1945  BPT:[id number]  Rehab Dx/Hx: 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, unspecified ([facility] [address] [I61.2]    Precautions: [] Aspiration  [x] Fall risk  [] Sternal  [] Seizure [] Hip  [] Weight Bearing [] Other  ST Dx: [] Aphasia  [] Dysarthria  [] Apraxia   [] Oropharyngeal dysphagia [x] Cognitive Impairment  [] Other:   Date of Admit: 2021  Room #: 3104/3104-01  Date: 2021          Current Diet Order:DIET GENERAL;  Dietary Nutrition Supplements: Standard High Calorie Oral Supplement   Vision  Vision: Impaired  Vision Exceptions: Wears glasses at all times  Hearing  Hearing: Within functional limits  Barriers toward progress: None towards stated goals      Date: 2021     Tx session 1   Total Timed Code Min 30   Total Treatment Minutes 30   Individual Treatment Minutes 30   Group Treatment Minutes 0   Co-Treat Minutes 0   Brief Exception: N/A   Pain None indicated   Pain Intervention: [] RN notified  [] Repositioned  [] Intervention offered and patient declined  [x] N/A  [] Other:   Subjective:     Pt up in chair, pleasant and cooperative. Endorsed \"my memory's not too good. \" Pt with rough vocal quality. Objective / Goals:    Patient will participate in ongoing cognitive-linguistic assessment. Patient was administered portions of the NIYAH (Assessment of Language-Related Functional Activities) with the following results:  · Counting Money: 100% accuracy, indicating high probability of independent functioning. Pt completed task in 4 minutes and 5 seconds. · Solving Daily Math Problems: 90% accuracy, indicating high probability of independent functioning. Pt completed task in 2 minutes and 38 seconds. · Writing a Check and Balancing a Checkbook: 70% accuracy, indicating high probability of independent functioning with this task.  Reduced visual acuity exacerbated mild executive dysfunction with checkbook balancing. Patient will complete tasks involving executive function skills with 90% accuracy/min cues. Oral reading errors exhibited x 7 during Solving Daily Math Problems subtest above. Pt self-corrected ~50% of errors. Other areas targeted:    Education:   Educated pt to R CVA, common sequelae of R CVA, rationale for tx tasks, skills targeted, and recommendations. Safety Devices: [x] Call light within reach  [x] Chair alarm activated and connected to nurse call light system  [] Bed alarm activated   [x] Other: reinforced use of call light     Assessment: Mild executive dysfunction, memory deficits. Plan: Continue per POC.      Interventions used this date:  [] Speech/Language Treatment  [] Instruction in HEP  [] Dysphagia Treatment [x] Cognitive Treatment   [] Other:    Discharge recommendations:  [] Home independently  [x] Home with assistance []  24 hour supervision  [] ECF [] Other  Continued Tx Upon Discharge: ? [] Yes    [] No    [x] TBD based on progress while on ARU     [] Vital Stim indicated     [] Other:   Estimated discharge date: Not yet established      Electronically signed by  Temo Anderson MA CCC-SLP; [id number]  Speech-Language Pathologist

## 2021-05-24 NOTE — PROGRESS NOTES
Pt awake in wheelchair eating breakfast. Physical assessment and vital signs as charted. Pt currently denies experiencing any pain at this time. Call light placed within reach. RN will continue to monitor Pt.

## 2021-05-25 PROCEDURE — 97129 THER IVNTJ 1ST 15 MIN: CPT

## 2021-05-25 PROCEDURE — 1280000000 HC REHAB R&B

## 2021-05-25 PROCEDURE — 99232 SBSQ HOSP IP/OBS MODERATE 35: CPT | Performed by: PHYSICAL MEDICINE & REHABILITATION

## 2021-05-25 PROCEDURE — 97530 THERAPEUTIC ACTIVITIES: CPT | Performed by: PHYSICAL THERAPIST

## 2021-05-25 PROCEDURE — 6370000000 HC RX 637 (ALT 250 FOR IP): Performed by: PHYSICAL MEDICINE & REHABILITATION

## 2021-05-25 PROCEDURE — 97130 THER IVNTJ EA ADDL 15 MIN: CPT

## 2021-05-25 PROCEDURE — 97530 THERAPEUTIC ACTIVITIES: CPT

## 2021-05-25 PROCEDURE — 97535 SELF CARE MNGMENT TRAINING: CPT

## 2021-05-25 PROCEDURE — 6360000002 HC RX W HCPCS: Performed by: PHYSICAL MEDICINE & REHABILITATION

## 2021-05-25 RX ADMIN — PANTOPRAZOLE SODIUM 40 MG: 40 TABLET, DELAYED RELEASE ORAL at 06:57

## 2021-05-25 RX ADMIN — ENOXAPARIN SODIUM 40 MG: 40 INJECTION SUBCUTANEOUS at 08:06

## 2021-05-25 RX ADMIN — MAGNESIUM OXIDE TAB 400 MG (240 MG ELEMENTAL MG) 400 MG: 400 (240 MG) TAB at 08:09

## 2021-05-25 RX ADMIN — ATORVASTATIN CALCIUM 40 MG: 40 TABLET, FILM COATED ORAL at 19:58

## 2021-05-25 RX ADMIN — ASPIRIN 81 MG: 81 TABLET, CHEWABLE ORAL at 08:08

## 2021-05-25 RX ADMIN — POLYETHYLENE GLYCOL 3350 17 G: 17 POWDER, FOR SOLUTION ORAL at 08:08

## 2021-05-25 RX ADMIN — AMOXICILLIN AND CLAVULANATE POTASSIUM 1 TABLET: 875; 125 TABLET, FILM COATED ORAL at 08:07

## 2021-05-25 RX ADMIN — MAGNESIUM OXIDE TAB 400 MG (240 MG ELEMENTAL MG) 400 MG: 400 (240 MG) TAB at 19:58

## 2021-05-25 RX ADMIN — Medication 2000 UNITS: at 08:09

## 2021-05-25 RX ADMIN — PREDNISONE 7.5 MG: 5 TABLET ORAL at 08:07

## 2021-05-25 RX ADMIN — DOCUSATE SODIUM 50 MG AND SENNOSIDES 8.6 MG 2 TABLET: 8.6; 5 TABLET, FILM COATED ORAL at 08:08

## 2021-05-25 RX ADMIN — CARVEDILOL 25 MG: 12.5 TABLET, FILM COATED ORAL at 17:12

## 2021-05-25 RX ADMIN — LEVOTHYROXINE SODIUM 50 MCG: 0.05 TABLET ORAL at 06:57

## 2021-05-25 RX ADMIN — CITALOPRAM HYDROBROMIDE 20 MG: 20 TABLET ORAL at 08:09

## 2021-05-25 RX ADMIN — AMOXICILLIN AND CLAVULANATE POTASSIUM 1 TABLET: 875; 125 TABLET, FILM COATED ORAL at 19:58

## 2021-05-25 RX ADMIN — POTASSIUM CHLORIDE 20 MEQ: 20 TABLET, EXTENDED RELEASE ORAL at 08:08

## 2021-05-25 RX ADMIN — MAGNESIUM OXIDE TAB 400 MG (240 MG ELEMENTAL MG) 400 MG: 400 (240 MG) TAB at 17:12

## 2021-05-25 RX ADMIN — FUROSEMIDE 20 MG: 20 TABLET ORAL at 08:08

## 2021-05-25 RX ADMIN — Medication 5 MG: at 19:58

## 2021-05-25 RX ADMIN — CARVEDILOL 25 MG: 12.5 TABLET, FILM COATED ORAL at 08:08

## 2021-05-25 RX ADMIN — DOCUSATE SODIUM 50 MG AND SENNOSIDES 8.6 MG 2 TABLET: 8.6; 5 TABLET, FILM COATED ORAL at 19:56

## 2021-05-25 RX ADMIN — AMLODIPINE BESYLATE 5 MG: 5 TABLET ORAL at 08:08

## 2021-05-25 RX ADMIN — AMOXICILLIN AND CLAVULANATE POTASSIUM 1 TABLET: 875; 125 TABLET, FILM COATED ORAL at 00:09

## 2021-05-25 RX ADMIN — MAGNESIUM OXIDE TAB 400 MG (240 MG ELEMENTAL MG) 400 MG: 400 (240 MG) TAB at 13:07

## 2021-05-25 RX ADMIN — LISINOPRIL 40 MG: 40 TABLET ORAL at 08:09

## 2021-05-25 ASSESSMENT — PAIN SCALES - GENERAL
PAINLEVEL_OUTOF10: 0

## 2021-05-25 NOTE — PLAN OF CARE
Problem: Falls - Risk of:  Goal: Absence of physical injury  Description: Absence of physical injury  Outcome: Ongoing  Note: Pt remains free of falls thus far this shift. All fall precautions in place and functioning properly. Problem: Skin Integrity:  Goal: Absence of new skin breakdown  Description: Absence of new skin breakdown  Outcome: Ongoing  Note: Patient offered to be toileted every two hours and PRN, skin barrier applied as needed. Staff assists patient with repositioning and pillow support is provided when needed. Patient educated on offloading techniques and verbalizes understanding. Problem: Nutrition  Goal: Optimal nutrition therapy  Outcome: Ongoing  Note: Patient continues to have a good appetite and eat adequately.

## 2021-05-25 NOTE — CARE COORDINATION
Referred to patient for d/c planning. Per team, anticipate possible d/c 6/12. Patient and family notified and agreeable. Will continue to follow for d/c needs.   Electronically signed by ISSA John, PETER-S on 5/25/2021 at 4:47 PM

## 2021-05-25 NOTE — PROGRESS NOTES
Department of Physical Medicine & Rehabilitation  Progress Note    Patient Identification:  Ana Martínez  [id number]  : 1945  Admit date: 2021    Chief Complaint: L hemiparesis due to R intracerebral hemorrhage    Subjective:   Doing well today. Started medication for UTI. She is participating well in therapy and still having quad weakness which is limiting her ability to stand. ROS: No f/c, n/v, cp. Objective:  Patient Vitals for the past 24 hrs:   BP Temp Temp src Pulse Resp SpO2   21 0745 125/74 97.6 °F (36.4 °C) Oral 62 18    21 2130 (!) 146/65 97.7 °F (36.5 °C) Oral 69 18 96 %   21 1600 118/76   72       Const: Alert. No distress, pleasant. HEENT: Normocephalic, atraumatic. Normal sclera/conjunctiva. MMM. CV: Regular rate and rhythm. Resp: No respiratory distress. Lungs CTAB. Abd: Soft, nontender, nondistended, NABS+   Ext: No edema. Neuro: Alert, oriented, appropriately interactive. Left neglect noted. Motor examination reveals normal strength in right side, 3-/5 strength left side diffusely. Psych: Cooperative, appropriate mood and affect    Laboratory data: Available via EMR. Last 24 hour lab  No results found for this or any previous visit (from the past 24 hour(s)). Therapy progress:  PT  Position Activity Restriction  Other position/activity restrictions: fall precautions  Objective     Sit to Stand: 2 Person Assistance, Maximum Assistance (Only partial stand obtained to modify w/c cushion. Pt unable to obtain a full stance)  Stand to sit: Maximum Assistance (Poor eccentric control with tendency to lean to L)  Bed to Chair: Dependent/Total (Performed a squat pivot with going from lower ( cong height w/c) > higher surface ( bed) req max A x2 leading with the L side. Bed> w/c dependent with going from higher >lower with leading with the R side.  Decreased clearance)     OT  PT Equipment Recommendations  Other: continue to assess, pt states that she owns a RW  Toilet - Technique: Sit pivot  Equipment Used:  (rolling shower chair)  Toilet Transfers Comments: Pt required Max Ax2 from w/c > rolling shower chair. Rolling shower chair was position to pt's R to go to pt's strong side. OT and PT first attempted transfer from w/c > RTS which was unsuccessful and pt required assist x3 to safely get pt back into w/c. Assessment        SLP          Body mass index is 31.61 kg/m². Assessment and Plan:  Right intracerebral hemorrhage with L hemiparesis  - Cont lipitor 40 mg daily, ASA 81 mg daily     UTI  UA: cloudy, large amoutn of LE, WBC 10-20, bacteria rare. - Urine culture in process  - Cont macrobid 100 mg BID (started 5/23) for 5 days    Systolic CHF (EF 93%)  - Cont coreg 25 mg BID,Lasix 20 mg daily     A Fib with ICD pacemaker  - Cont ASA (had been on Coumadin)     HTN  - Cont norvasc 5 mg daily, lisinopril 40 mg daily     HLD  - Cont lipitor 40 mg daily     Hypothyroidism  - Cont synthroid 50 mcg daily     Hx of rectal cancer s/p resection  - Colostomy present, monitor output    PPx: protonix 40 mg daily, lovenox 40 mg daily     ITP- Monitor     Bowels: Schedule Miralax + Senna S. Follow bowel movements. Enema or suppository if needed.      Bladder: Check PVR x 3.   [facility] if PVR > 200ml or if any volume is > 500 ml.      Pain: Tylenol is ordered prn.        Rehab Progress: Improving  Anticipated Dispo: SNF  Services/DME: SOLO  ELOS: SOLO Llanos D.O. M.P.H  PM&R  5/25/2021  10:23 AM

## 2021-05-25 NOTE — PROGRESS NOTES
ACUTE REHAB UNIT  SPEECH/LANGUAGE PATHOLOGY      [x] Daily  [x] Weekly Care Conference Note  [] Discharge    Patient: Alberto Rondon      :1945  PZV:[id number]  Rehab Dx/Hx: 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, unspecified ([facility] [address] [I61.2]    Precautions: [] Aspiration  [x] Fall risk  [] Sternal  [] Seizure [] Hip  [] Weight Bearing [] Other  ST Dx: [] Aphasia  [] Dysarthria  [] Apraxia   [] Oropharyngeal dysphagia [x] Cognitive Impairment  [] Other:   Date of Admit: 2021  Room #: 3104/3104-01  Date: 2021          Current Diet Order:DIET GENERAL;  Dietary Nutrition Supplements: Standard High Calorie Oral Supplement   Vision  Vision: Impaired  Vision Exceptions: Wears glasses at all times  Hearing  Hearing: Within functional limits  Barriers toward progress: medical status (current UTI)      Date: 2021      Tx session 1 Weekly Summary   Total Timed Code Min 30    Total Treatment Minutes 30    Individual Treatment Minutes 30    Group Treatment Minutes 0    Co-Treat Minutes 0    Brief Exception: N/A    Pain None indicated    Pain Intervention: [] RN notified  [] Repositioned  [] Intervention offered and patient declined  [x] N/A  [] Other:    Subjective:     Pt in w/c upon entry, pleasant and cooperative. Pt with note on bedside table as reminder to use call light to notify RN for toileting needs - unclear if pt not initiating use vs recalling to use. Increased confabulations noted with confusion re: staff members notified by call light and who provides assist with toileting. Appeared to be d/t pt not initiating use of call light d/t previously not using toilet and soiling brief at [facility] per staff instruction. Objective / Goals:     Patient will participate in ongoing cognitive-linguistic assessment.     Patient was administered portions of the NIYAH (Assessment of Language-Related Functional Activities) with the following results:  · Understanding Medication Labels: 90% accuracy, indicating high probability of independent functioning with this task. · Using a Calendar: 90% accuracy, indicating high probability of independent functioning with this task. · Reading Instructions: 90% accuracy, indicating high probability of independent functioning with this task. Pt with working memory deficits and required frequent repetition of instructions t/o tasks d/t reduced recall. Poor recall of ST session from previous day with pt repeating all information previously provided to SLP w/o awareness of repetition / perseveration. Mod-max cues to identify skills targeted in [address] [place] [address]  Adding 2 coin types presented verbally: 100% accuracy with min-no cues. Pt required use of written notes for ~50% of opps. GOAL MET     Patient will complete tasks involving executive function skills with 90% accuracy/min cues. Mod cues required to identify and correct errors made in relation to reasoning re: call light and toileting. GOAL NOT MET   Other areas targeted:     Education:   Educated to skills targeted, rationale for tx tasks, deficits noted, results of session, and recommendations. Safety Devices: [x] Call light within reach  [x] Chair alarm activated and connected to nurse call light system  [] Bed alarm activated   [x] Other: reinforced use of call light      Assessment: Increased confabulations noted with mildly worsened recall - question if cognitive linguistic deficits exacerbated by current UTI. Plan: Continue per POC. NEW GOAL:  Patient will complete graded recall tasks with 85% accuracy or min cues.       Interventions used this date:  [] Speech/Language Treatment  [] Instruction in HEP  [] Dysphagia Treatment [x] Cognitive Treatment   [] Other:    Discharge recommendations:  [] Home independently  [x] Home with assistance []  24 hour supervision  [] ECF [] Other  Continued Tx Upon Discharge: ? [] Yes    [] No    [x] TBD based on progress while on ARU     [] Vital Stim indicated [] Other:   Estimated discharge date: Not yet established      Electronically signed by  Vince Cerna MA CCC-SLP; [id number]  Speech-Language Pathologist

## 2021-05-25 NOTE — PROGRESS NOTES
Attending requesting RN to consult pharmacy for recommendations for a different antibiotic to treat Pt's UTI based off of her urine culture results. Pharmacy's recommendations Perfect Served to Hekenneth DO at 2043. RN currently waiting for a response.

## 2021-05-25 NOTE — PROGRESS NOTES
Physical Therapy  Facility/Department: [facility] ACUTE REHAB UNIT  Daily Treatment Note  NAME: Paulette Alvarez  : 1945  MRN: [id number]    Date of Service: 2021    Discharge Recommendations:  24 hour supervision or assist, Home with Home health PT   PT Equipment Recommendations  Other: continue to assess, pt states that she owns a RW    Assessment   Body structures, Functions, Activity limitations: Decreased functional mobility ; Decreased safe awareness;Decreased balance;Decreased cognition;Decreased vision/visual deficit; Decreased endurance;Decreased strength;Decreased sensation;Decreased fine motor control  Assessment: AM session addressed bed mobility, transf and bed mobility to perform functional tasks of dressing and toileting. Pt is challenged by the decreased sensation in the L hand and L side of body. Pt is receptive to recommendations to compensate but req a significant amount of intervention at this time. Pt cont to maximize effort with mobility. Pt is well below baseline and would benefit from cont therapy to maximize potential and increase functional obility towards Ind to allow for a safer d/c to home. Treatment Diagnosis: decreased functional mobility due to nontraumatic ICH  Decision Making: Medium Complexity  PT Education: General Safety; Energy Conservation;Transfer Training;Orientation; Adaptive Device Training;Functional Mobility Training  Activity Tolerance  Activity Tolerance: Patient limited by fatigue;Patient limited by endurance     Patient Diagnosis(es): There were no encounter diagnoses. has a past medical history of Arthritis, CAD (coronary artery disease), Cancer ([facility] Cerebral artery occlusion with cerebral infarction [facility]), [facility] Hypertension, and Thyroid disease. has a past surgical history that includes Abdomen surgery; Hysterectomy; Colonoscopy; hernia repair; and pacemaker placement.     Restrictions  Position Activity Restriction  Other Bed to Chair: Dependent/Total (Performed a slide board ( SB ) with going from lower ( cong height w/c) > higher surface ( bed) req mod  A x2 leading with the R side. Bed> w/c dependent with going from higher >lower with leading with the L  side req min A x2 with use of the SB.)  Squat Pivot Transfers: 2 Person Assistance;Dependent/Total (w/c > mat table ( lateral surface with R side leading req Max A x1 and Min A x1 ) Leading with L side mat > w/c req Max A x1 and CGA of another)   All mobility req additional time at this time. Comment: Secondary to pt being incontinent of bladder, therapy transf pt to bed to perform LB dressing changes. Refer to OT note for status . PT instructed pt with the bed mobility skills to enable pt to assist. Pt utilized the bedrail   For optimal positioning in w/c, therapy utilize a gravity facilitated technique of a w/c tilt and assisting pt to back of chair. This also allows for increased symmetry with WB distribution through hips. Second session: Pt sitting in BS chair when PT arrived. Pt agreeable to therapy,. PT and OT worked collaboratively to utilize the skills of 2 disciplines while maximizing functional mobility to obtain optimal potential.    Nsg requesting assist with transf in prep for a shower. Options discussed in which a gary stedy was then utilized. To increase WB through BLES , facilitate increased B knee extension and facilitate a more erect posture   Transfers  Sit to Stand: 2 Person Assistance (Used the GARY stedy to facilitate standing with pt. Max A x2 req with VC for pt to stand more erect. Therapy utilized a sheet under pt's buttocks to facilitate standing position. Steven standing ~30 secs at a time.  Steven 5 stands)  Stand to sit: Maximum Assistance (Poor eccentric control with tendency to lean to L)  After standing with Bennett alcantara, therapy then instructed pt with reaching activities in all directions both unilat and BUE s used  to facilitate increased WB and increased trunk control. Therapy assisted pt with LB clothing management in prep for shower. Pt transf to rolling shower chair from stedy. PCA present and cont with pt for a shower. G-Code     OutComes Score                                                     AM-PAC Score             Goals  Short term goals  Time Frame for Short term goals: 2 weeks  Short term goal 1: Pt will perform all bed mobility with Osei. Ongoing  Short term goal 2: Pt will perform squat pivot with MaxA. Ongoing  Short term goal 3: Pt will perform sit to/from stand with LRAD and MaxA. Ongoing  Short term goal 4: Pt will propel w/c 150' with supervision. Ongoing  Long term goals  Time Frame for Long term goals : 3-4 weeks  Long term goal 1: Pt will perform all bed mobility with supervision. Ongoing  Long term goal 2: Pt will perform squat pivot with CGA. Ongoing  Long term goal 3: Pt will perform sit to/from stand with LRAD and Osei. Ongoing  Long term goal 4: Pt will propel w/c 150' with Baltazar. Ongoing  Patient Goals   Patient goals : \"I want to walk again. \"    Plan    Plan  Times per week: 5x/week, 75 minutes a day  Current Treatment Recommendations: Strengthening, Neuromuscular Re-education, Home Exercise Program, ROM, Safety Education & Training, Balance Training, Endurance Training, Patient/Caregiver Education & Training, Functional Mobility Training, Wheelchair Mobility Training, Equipment Evaluation, Education, & procurement, Transfer Training, Gait Training  Safety Devices  Type of devices:  All fall risk precautions in place, Call light within reach, Chair alarm in place, Gait belt, Left in chair, Nurse notified (THerapy built up the L side of w/c cushion to facilitate symmetrical sitting)     Therapy Time   Individual Concurrent Group Co-treatment   Time In       0835   Time Out       0935   Minutes       61        Second Session Therapy Time:   Individual Concurrent Group Co-treatment   Time In       1140   Time Out [address]       35     Timed Code Treatment Minutes:  02+01    Total Treatment Minutes:  95    John Cost, PT

## 2021-05-25 NOTE — PLAN OF CARE
Problem: Falls - Risk of:  Goal: Will remain free from falls  Description: Will remain free from falls  5/24/2021 2327 by Stephy Romeo RN  Outcome: Ongoing     Problem: Falls - Risk of:  Goal: Absence of physical injury  Description: Absence of physical injury  Outcome: Ongoing     Problem: Skin Integrity:  Goal: Will show no infection signs and symptoms  Description: Will show no infection signs and symptoms  5/24/2021 2327 by Stephy Romeo RN  Outcome: Ongoing     Problem: Skin Integrity:  Goal: Absence of new skin breakdown  Description: Absence of new skin breakdown  5/24/2021 2327 by Stephy Romeo RN  Outcome: Ongoing

## 2021-05-25 NOTE — PROGRESS NOTES
Alert and oriented. VSS on RA. Denying pain or discomfort. Swallowing pills whole in pudding without difficulty. Thin liquids. No S/S of aspiration. Appetite good. Continent several times of clear, yellow urine. Not malodorous. 2-3 person slide board transfer with gait belt. Active all extremities with increased weakness and decreased sensation of LUE/LLE. Up in chair. Call light within reach. Chair alarm in place. Will continue to monitor.

## 2021-05-25 NOTE — PROGRESS NOTES
Pt assessment conducted post handoff from [facility]. Pt repositioned and diaper changed at this time.  Pt resting comfortably in bed will continue to monitor

## 2021-05-25 NOTE — PROGRESS NOTES
Occupational Therapy  Facility/Department: [facility] ACUTE REHAB UNIT  Daily Treatment Note  NAME: Alonso Mckeon  : 1945  MRN: [id number]    Date of Service: 2021    Discharge Recommendations:  Home with Home health OT, Home with nursing aide, 24 hour supervision or assist, Continue to assess pending progress  OT Equipment Recommendations  ADL Assistive Devices: Shower Chair with back  Other: Continue to assess for needs    Assessment   Performance deficits / Impairments: Decreased functional mobility ; Decreased cognition;Decreased ADL status; Decreased endurance;Decreased fine motor control;Decreased ROM; Decreased sensation;Decreased coordination;Decreased strength;Decreased balance;Decreased posture;Decreased safe awareness  Assessment: Pt demonstrated great participation in OT today as evident by pt ability to completed pants management sidelying in bed w/ min A needed to manage on the L. Pt completed slide board transfers w/ Mod Ax2 [facility] [address] when transferring from high to low surface. Pt has improved LUE ROM however is most limited by impaired sensation and proprioception. Pt is highly motivated and continues to make good progress in OT. Cont OT per POC  Treatment Diagnosis: Decreased ADL status and decreased ROM 2/2 CVA    OT Education: ADL Adaptive Strategies;Transfer Training  Patient Education: pt verb understanding- continue to reinforce  REQUIRES OT FOLLOW UP: Yes  Activity Tolerance  Activity Tolerance: Patient Tolerated treatment well  Safety Devices  Safety Devices in place: Yes  Type of devices: Call light within reach; Chair alarm in place; Left in chair;Nurse notified         Patient Diagnosis(es): There were no encounter diagnoses. has a past medical history of Arthritis, CAD (coronary artery disease), Cancer ([facility] Cerebral artery occlusion with cerebral infarction [facility]), CHF (congestive heart failure) ([facility] Hypertension, and Thyroid disease.    has a past surgical history that includes Abdomen surgery; Hysterectomy; Colonoscopy; hernia repair; and pacemaker placement. Restrictions  Position Activity Restriction  Other position/activity restrictions: fall precautions  Subjective   General  Chart Reviewed: Yes  Patient assessed for rehabilitation services?: Yes  Additional Pertinent Hx: Per MD AURA and P:Patient is a 68 yo female who originally admitted to hospital /23/21-2/9/21 for Right intracerebral hemorrhage with L hemiparesis. She has a know past medical hx of systolic CHF (EF 03%), A Fib (on warfarin), ICD, HTN, Hypothyroidism, rectal cancer s/p resection, and ITP. Her hemorrhage became worse with a midline shift and decline in her neurologic status. Patient was started on seizure prophalxis, and neurosurgery followed, but no indication for surgery. She was also treated for UTI, anti-hypertensives adjusted to maintain bp in normal range  with ICH. She failed MBS and GI consulted who recommended PEG. She was also treated with zosyn for possible aspiration pneumonia. S/P PEG placed on 2/6/21. EGD recommended PPI therapy. She remained stable and was discharged to Critical access hospital at [facility]. Patient presented to SNF on 2/9/21. She improved with her swallowing and tolerated a regular diet,  and GI removed PEG on 5/18/21. Pt had been at the [facility] since February and has now exhausted her days left at the SNF. Pt admitted to [facility] 5/21  Family / Caregiver Present: No  Referring Practitioner: Dr. Jacque Cantu  Diagnosis: CVA  Subjective  Subjective: Pt was seated in w/c upon arrival and finishing breakfast. Pt was pleasant and agreeable to OT/PT. Co treat indicated to maximize functional independence. Vital Signs  Patient Currently in Pain: Denies   Orientation     Objective    ADL  UE Dressing: Setup;Verbal cueing; Increased time to complete;Minimal assistance (Pt donned tshirt 4/4 components w/ min VCs to thread L sleeve completely above L elbow. Pt required min A to pull jacket around back.  Pt was able to thread BUEs into jacket w/o assistance)  LE Dressing: Increased time to complete;Verbal cueing;Minimal assistance (See below for more comments)  Toileting: Minimal assistance (See below for more comments)  Additional Comments: Pt reported that she had urinated in her depends while seated in the w/c. Pt stated she did not call the nurse to assist her to the bathroom. OT educated pt to call the nurse if she needs to use the bathroom and to still call the nurse even if she had an accident in order for the nurses to assist w/ marilyn care quickly to prevent skin breakdown. Pt verb understanding. Pt was assisted w/ from to bed to complete pants management. Pt rolled L and R in bed and was able to manage clothing down on the R I'ly but required min A on the L due to decreased ROM. Once soiled clothing were removed, pt was able to complete front marilyn care w/ setup assist of wipes. OT assisted w/ donning clean underwear due to time constraints. Pt was then able to roll L and R again w/ min A needed to pull clothes up on the L. Pt was able to pull clothes over hips on the R. Pt required + time for dressing tasks but demonstrated great participation and improvement. Balance  Sitting Balance: Stand by assistance (Seated EOB)    Bed mobility  Rolling to Left: Stand by assistance (w/ use of bed rail)  Rolling to Right: Contact guard assistance (w/ use of bed rail; VCs to bend knees)  Supine to Sit: Maximum assistance (assist at trunk and BLEs)  Sit to Supine: Contact guard assistance (use of bed rail and VCs needed for BLE flexion and advancement OOB)    Transfers  Slide Board: 2 Person assistance (Slide board transfer completed up to elevated EOB from w/c w/ Mod Ax2. Slide board completed from high to low surface EOB to w/c w/ Min Ax2. VCs needed for safe technique.  CGA at LLE to prevent extension)  Transfer Comments: Pt was assisted in posterior tilt in w/c in order to adjust hips for improved midline orientation and trunk control                         Cognition  Arousal/Alertness: Appropriate responses to stimuli  Following Commands: Follows multistep commands with increased time; Follows multistep commands with repitition  Attention Span: Appears intact  Memory: Decreased recall of recent events  Safety Judgement: Good awareness of safety precautions  Problem Solving: Assistance required to generate solutions  Insights: Fully aware of deficits  Initiation: Requires cues for some  Sequencing: Requires cues for some  Cognition Comment: Pt w/ some delayed recall at times. Pt is motivated and pleasant       Perception  Unilateral Attention: Cues to attend left visual field;Cues to attend to left side of body       Positioning  Wheelchair Position Type: 1/2 lap tray (LUE)                            2nd session: Pt was seated in w/c upon arrival. Pt was pleasant and agreeable to OT/PT. Co treat indicated to maximize functional independence. Weight bearing:  Pt was assisted from w/ into [facility] in order to promote weight bearing through [address]. Pt completed sit to stand from w/ to [facility] w/ Max Ax2. Once seated on [facility] paddles pt maintained seated balance w/ SBA. Pt completed 5 sit to stands from paddles w/ Max Ax2 w/ use of rolled sheet to promote hip extension and upright posture. Pt maintained upright posture for ~25s w/ assistance from OT and PT to maintain sheet under pt's hips. Pt tolerated WB position but c/o increased knee pain w/ prolonged stance. Pt demonstrated decreased L knee extension. In order to promote increased L knee extension and improve trunk/hip rotation, pt reached outside of JHOAN w/ unilateral UE and BUE to retrieve objects from various planes/directions. Pt completed while seated on paddles w/ SBA and no LOB. Pt was assited at EOS from [address] [place] [address] onto rolling shower chair in order for PCA to assist w/ shower. PCA present at end of session.                Plan   Plan  Times per week: 5x a week

## 2021-05-26 LAB
ORGANISM: ABNORMAL
ORGANISM: ABNORMAL
URINE CULTURE, ROUTINE: ABNORMAL

## 2021-05-26 PROCEDURE — 97530 THERAPEUTIC ACTIVITIES: CPT

## 2021-05-26 PROCEDURE — 97129 THER IVNTJ 1ST 15 MIN: CPT

## 2021-05-26 PROCEDURE — 6370000000 HC RX 637 (ALT 250 FOR IP): Performed by: PHYSICAL MEDICINE & REHABILITATION

## 2021-05-26 PROCEDURE — 97530 THERAPEUTIC ACTIVITIES: CPT | Performed by: PHYSICAL THERAPIST

## 2021-05-26 PROCEDURE — 6360000002 HC RX W HCPCS: Performed by: PHYSICAL MEDICINE & REHABILITATION

## 2021-05-26 PROCEDURE — 1280000000 HC REHAB R&B

## 2021-05-26 PROCEDURE — 97542 WHEELCHAIR MNGMENT TRAINING: CPT | Performed by: PHYSICAL THERAPIST

## 2021-05-26 PROCEDURE — 99232 SBSQ HOSP IP/OBS MODERATE 35: CPT | Performed by: PHYSICAL MEDICINE & REHABILITATION

## 2021-05-26 PROCEDURE — 97110 THERAPEUTIC EXERCISES: CPT | Performed by: PHYSICAL THERAPIST

## 2021-05-26 PROCEDURE — 97535 SELF CARE MNGMENT TRAINING: CPT

## 2021-05-26 PROCEDURE — 97130 THER IVNTJ EA ADDL 15 MIN: CPT

## 2021-05-26 RX ADMIN — Medication 2000 UNITS: at 08:53

## 2021-05-26 RX ADMIN — DOCUSATE SODIUM 50 MG AND SENNOSIDES 8.6 MG 2 TABLET: 8.6; 5 TABLET, FILM COATED ORAL at 08:53

## 2021-05-26 RX ADMIN — ASPIRIN 81 MG: 81 TABLET, CHEWABLE ORAL at 08:53

## 2021-05-26 RX ADMIN — PANTOPRAZOLE SODIUM 40 MG: 40 TABLET, DELAYED RELEASE ORAL at 05:22

## 2021-05-26 RX ADMIN — CARVEDILOL 25 MG: 12.5 TABLET, FILM COATED ORAL at 17:35

## 2021-05-26 RX ADMIN — Medication 5 MG: at 22:34

## 2021-05-26 RX ADMIN — MAGNESIUM OXIDE TAB 400 MG (240 MG ELEMENTAL MG) 400 MG: 400 (240 MG) TAB at 14:20

## 2021-05-26 RX ADMIN — MAGNESIUM OXIDE TAB 400 MG (240 MG ELEMENTAL MG) 400 MG: 400 (240 MG) TAB at 17:36

## 2021-05-26 RX ADMIN — POTASSIUM CHLORIDE 20 MEQ: 20 TABLET, EXTENDED RELEASE ORAL at 08:53

## 2021-05-26 RX ADMIN — FUROSEMIDE 20 MG: 20 TABLET ORAL at 08:53

## 2021-05-26 RX ADMIN — AMLODIPINE BESYLATE 5 MG: 5 TABLET ORAL at 08:52

## 2021-05-26 RX ADMIN — CARVEDILOL 25 MG: 12.5 TABLET, FILM COATED ORAL at 22:33

## 2021-05-26 RX ADMIN — AMOXICILLIN AND CLAVULANATE POTASSIUM 1 TABLET: 875; 125 TABLET, FILM COATED ORAL at 08:53

## 2021-05-26 RX ADMIN — LISINOPRIL 40 MG: 40 TABLET ORAL at 08:52

## 2021-05-26 RX ADMIN — PREDNISONE 7.5 MG: 5 TABLET ORAL at 08:53

## 2021-05-26 RX ADMIN — LEVOTHYROXINE SODIUM 50 MCG: 0.05 TABLET ORAL at 05:22

## 2021-05-26 RX ADMIN — CITALOPRAM HYDROBROMIDE 20 MG: 20 TABLET ORAL at 08:52

## 2021-05-26 RX ADMIN — CARVEDILOL 25 MG: 12.5 TABLET, FILM COATED ORAL at 08:53

## 2021-05-26 RX ADMIN — POLYETHYLENE GLYCOL 3350 17 G: 17 POWDER, FOR SOLUTION ORAL at 08:53

## 2021-05-26 RX ADMIN — ENOXAPARIN SODIUM 40 MG: 40 INJECTION SUBCUTANEOUS at 08:52

## 2021-05-26 RX ADMIN — AMOXICILLIN AND CLAVULANATE POTASSIUM 1 TABLET: 875; 125 TABLET, FILM COATED ORAL at 22:34

## 2021-05-26 RX ADMIN — MAGNESIUM OXIDE TAB 400 MG (240 MG ELEMENTAL MG) 400 MG: 400 (240 MG) TAB at 22:33

## 2021-05-26 RX ADMIN — MAGNESIUM OXIDE TAB 400 MG (240 MG ELEMENTAL MG) 400 MG: 400 (240 MG) TAB at 08:54

## 2021-05-26 RX ADMIN — ATORVASTATIN CALCIUM 40 MG: 40 TABLET, FILM COATED ORAL at 22:33

## 2021-05-26 ASSESSMENT — PAIN SCALES - GENERAL
PAINLEVEL_OUTOF10: 0

## 2021-05-26 NOTE — PATIENT CARE CONFERENCE
Inpatient Rehabilitation  Weekly Team Conference Note  The [address]  [phone]  Patient Name: Richard Koehler        MRN: [id number]    : 1945  (67 y.o.)  Gender: female   Referring Practitioner: Yogi Carvalho MD  Diagnosis: nontraumatic ICH  The team conference for this patient was held on 2021 at 10:30am by:  Beth Riggs DO    Current/Goal QM SCORES  QM Current/Goal Score   Eating CARE Score: 5 / Discharge Goal: Independent   Oral Hygiene CARE Score: 5 / Discharge Goal: Independent   Shower/Bathing CARE Score: 2 / Discharge Goal: Supervision or touching assistance   UB Dressing CARE Score: 3 / Discharge Goal: Supervision or touching assistance   LB Dressing CARE Score: 2 / Discharge Goal: Supervision or touching assistance   Putting on/off Footwear CARE Score: 1 / Discharge Goal: Supervision or touching assistance   Toileting Hygiene CARE Score: 2 / Discharge Goal: Supervision or touching assistance   Bladder Continence Bladder Continence: Incontinent daily    Bowel Continence Bowel Continence: Not rated    Toilet Transfers CARE Score: 1 / Discharge Goal: Supervision or touching assistance   Shower/Bathe Self  CARE Score: 2 / Discharge Goal: Supervision or touching assistance   Rolling Left and Right CARE Score: 3 / Discharge Goal: Supervision or touching assistance   Sit to Lying CARE Score: 4 / Discharge Goal: Supervision or touching assistance   Lying to Sitting on Bedside CARE Score: 3 / Discharge Goal: Supervision or touching assistance   Sit to Stand CARE Score: 1 / Discharge Goal: Partial/moderate assistance   Chair/Bed to Chair Transfer CARE Score: 1 / Discharge Goal: Supervision or touching assistance   Car Transfers CARE Score: 1 / Discharge Goal: Partial/moderate assistance   Walk 10 Feet CARE Score: 9 / Discharge Goal: Not Applicable   Walk 50 Feet with Two Turns CARE Score: 9 / Discharge Goal: Not Applicable Walk 150 Feet CARE Score: 9 / Discharge Goal: Not Applicable   Walk 10 Feet on Uneven Surfaces CARE Score: 9 / Discharge Goal: Not Applicable   1 Step (Curb) CARE Score: 9 / Discharge Goal: Not Applicable   4 Steps CARE Score: 9 / Discharge Goal: Not Applicable   12 Steps CARE Score: 9 / Discharge Goal: Not Applicable   Picking up Object from Floor CARE Score: 88 / Discharge Goal: Independent   Wheel 50 Feet with 2 Turns CARE Score: 4 / Discharge Goal: Independent   Type         [x] Manual        [] Motorized        [] N/A   Wheel 150 Feet CARE Score: 88 / Discharge Goal: Independent   Type         [x] Manual        [] Motorized        [] N/A     NURSING:  A&Ox: Level of Consciousness: Alert (0)  Orientation Level: Oriented X4  [place] Fall Risk Score: Score: 35  Admission BIMS: 9  Wounds/Incisions/Ulcers: Colostomy L lower quad  Medication Review: Reviewed meds  Pain: no pain at this time  Consultations: n/a  Imaging:   No orders to display     Active Comorbid Conditions: CAD, Cancer, CHF, Hypertension  Systems Review:   Renal: urinary frequency, Dialysis:  Type: , Frequency:   Neurological: A&O x 2, Flaccid L side  Musculoskeletal: Bilateral extremity weakness  Respiratory: Clear/diminished   Cardiac/Circulatory/Peripheral Vascular: Irregular   Abnormal/Relevant Test Results: BUN 6, Creat <0.5, Mag 1.70, UTI +  Abnormal/Relevant Lab Values:   CBC:   No results for input(s): WBC, HGB, HCT, MCV, PLT in the last 72 hours. BMP:   No results for input(s): NA, K, CL, CO2, PHOS, BUN, CREATININE in the last 72 hours. Invalid input(s): CA  PT/INR: No results for input(s): PROTIME, INR in the last 72 hours. APTT: No results for input(s): APTT in the last 72 hours. Liver Profile:  No results found for: AST, ALT, ALB, BILIDIR, BILITOT, ALKPHOS, GGT, 5NUCNo results found for: CHOL, HDL, TRIG  No results for input(s): WBC, HGB, HCT, PLT, MCV in the last 72 hours.   No results for input(s): NA, K, CL, CO2, PHOS, BUN, CREATININE in the last 72 hours. Invalid input(s): CA  No results for input(s): AST, ALT, ALB, BILIDIR, BILITOT, ALKPHOS in the last 72 hours. No results for input(s): MG in the last 72 hours. No results for input(s): WBC, HGB, HCT, PLT in the last 72 hours. No results for input(s): NA, K, CL, CO2, BUN, CREATININE, CALCIUM, PHOS in the last 72 hours. Invalid input(s): MAGNES  No results for input(s): AST, ALT, BILIDIR, BILITOT, ALKPHOS in the last 72 hours. No results for input(s): INR in the last 72 hours. No results for input(s): Bobbi Councilman in the last 72 hours. PHYSICAL THERAPY:  Transfers:  Sit to Stand: Dependent/Total (Utilized a standing table in which pt actively participated transitioning from sitting to standing)  Stand to sit: Dependent/Total (Utilized the standing frame so was dependently positioned back into sitting)  Bed to Chair: Dependent/Total (Performed a slide board ( SB ) with going from lower ( cong height w/c) > higher surface ( bed) req mod  A x2 leading with the R side. Bed> w/c dependent with going from higher >lower with leading with the L  side req min A x2 with use of the SB.)  Squat Pivot Transfers: 2 Person Assistance, Dependent/Total (w/c > standing frame  (Utilized beasy board w/ transition( R side leading req Max A x2 (Lower surface > higher surface) Leading w/ L side standing frame > w/c( higher  > lower surface using beasy board)  req CGA . (dependent on placement of  beasy board)  Comment: Secondary to pt being incontinent of bladder, therapy transf pt to bed to perform LB dressing changes. Refer to OT note for status .  PT instructed pt with the bed mobility skills to enable pt to assist. Pt utilized the bedrail  WB Status: Pt is not safe for ambulation at this time)    OCCUPATIONAL THERAPY:  ADL  Feeding: Setup (OT assisted w/ opening packages and containers)  Grooming: Setup (Pt washed her face seated on rolling shower chair and combed her hair w/ RUE)  UE Bathing: Setup, Verbal cueing, Increased time to complete, Minimal assistance (Pt initiated washing chest, abdomen, and BUEs w/ RUE. Pt was able to hold the wash cloth in LUE to reach RUE but w/ a weak grasp noted resulting in decreased thoroughness. Min A was needed to completely wash and dry RUE)  LE Bathing: Maximum assistance (Pt was able to was top of B thighs but required assistance to reach BLEs and to wash buttocks from seated position.)  UE Dressing: Verbal cueing, Setup, Increased time to complete, Minimal assistance (Pt threaded BUEs into tshirt w/ VCs to thread LUE first. Pt was able to pull shirt down in the back w/o assist. Pt donned jacket w/ min A needed to pull jacket around back. ++ time and mod VCs needed for proper donning of jacket.)  LE Dressing: Setup, Verbal cueing, Increased time to complete, Maximum assistance (See below for more comments)  Toileting: Maximum assistance (See below for more comments)  Additional Comments: Pt reported that she needed to use the bathroom upon OT arrival. Pt's socks and shoes were donned supine in bed in preparation to transfer to [facility]. Pt assisted w/ removal of depends supine in bed however pt required max VCs and max A to doff completely. Pt was not attending to her L side to see the depends and required max VCs to turn her head completely. Pt was assisted from EOB to [facility] w/ slide board. Pt urinated 2x while seated on BSC and was able to complete front marilyn care w/ setup assist. Pt was assisted back to bed for LE dressing since pt reported she didn't feel comfortable on the [facility]. While supine in bed pt completed front marilyn care again and was able to thoroughly clean herself. Pt was able to complete figure 4 supine in bed to doff shoes. Pt attempted figure 4 to thread BLEs into underwear and pants however pt could not sustain this position for enough time in order to thread clothing. Pt stated this position was too painful on her knees.  OT assisted w/ threading both underwear and pants and then pt was able to roll L and R to assist w/ pulling clothing over her hips. Pt could successfully pull clothes up on the R but could not on the L due to impaired coordination w/ LUE. Pt had increased difficulty initiating and sequencing dressing tasks today and required max VCs for success. Pt also demonstrated poor recall of events during session and frequently repeated information and requests. Pt stated she felt \"a little out of it\" toddchema (RN notified). OT also assisted w/ donning shoes due to time constraints. Toilet Transfers: Toilet Transfers  Toilet - Technique: Lateral (slide board)  Equipment Used: Standard bedside commode  Toilet Transfer: Dependent/Total  Toilet Transfers Comments: Min Ax2 from EOB > BSC; Max Ax2 from [facility] to EOB. Tub/ShowerTransfers:  Shower Transfers  Shower - Transfer From: Wheelchair (rolling shower chair)  Shower - Transfer Type: To and From  Shower - Transfer To:  (rolling shower chair)  Shower Transfers: Dependent  Shower Transfers Comments: Pt required Max Ax2 from w/c > rolling shower chair. Pt was assisted in rolling shower chair into the shower. This was also utilized for toileting. OT recommended use of rolling shower chair for toileting to allow pt to always transfer to her R side as transfer to her L was highly unsuccessful. SPEECH THERAPY:  Assessment: Speech Therapy Diagnosis  Cognitive Diagnosis: Mild cognitive-linguistic deficit (specifically executive function)  Mild short term memory impairment. NUTRITION:  Please see nutrition note for details. Weight: 172 lb 13.5 oz (78.4 kg) / Body mass index is 31.61 kg/m². Current diet order:  DIET GENERAL;  Dietary Nutrition Supplements: Standard High Calorie Oral Supplement         Feeding: Needs set up  Room Service: Selective   NSG Recorded PO: PO Meals Eaten (%): 76 - 100% PO Supplement (%): 76 - 100%  Malnutrition Status Malnutrition Status:  At risk for malnutrition (Comment) CASE MANAGEMENT:  Psychosocial/Behavioral Issues: none noted  Assessment:  Referred to patient for d/c planning. Continue to work with patient and family. Daughters plan to assist patient on d/c.  Plan for home with home care on d/c. Patient/Family Education provided by team:  Role of PT/OT, stroke recovery, safe transfers, L attention, association strategy for memory    Patient Goals for Rehab stay:  1. be able to walk      Rehab Team Goals for patient for the Upcoming Week:  1. increase independence  W/ functional transfers  2. Increase independence w/ ADLs     Barriers to Progress/Attainment of Goals & Efforts/Interventions to remove Barriers:  1. B knee pain- consider biofreeze before therapy   2. LUE/ LLE weakness- continue OT/PT   3. Recall deficits - ongoing ST    Discharge Plan:  Estimated Length of Stay: 23 days  Destination: home health and discharge home with supervision  Services at Discharge: [employer], Occupational Therapy, Speech Therapy and Nursing 3x week  Equipment at Discharge: TBD   Community Resources: TBD  Factors facilitating achievement of predicted outcomes: Motivated, Cooperative and Pleasant  Barriers to the achievement of predicted outcomes: Cognitive deficit, Decreased endurance, Decreased proprioception, Upper extremity weakness and Lower extremity weakness    Special Needs in the Upcoming Week:   [x] Family/Caregiver Education  [] Home visit  []Therapeutic Pass [] Equipment  [] Consults:_______  [] Family/Caregiver Training  [] Stroke Risk Factor Education     [] Other;_______     TEAM SUMMARY: Will continue with current poc & goals until anticipated d/c date of 6/12/2021. MD:   Stroke Risk Factors:   [] N/A for this patient [x] HTN  []  Diabetes  [] Hyperlipidemia  []Obesity BMI >25  [x] Atrial Fibrillation [] Smoker (current)  [] Smoker (quit in last 12 months)  [] Sleep Apnea [x] Other:   On anticoagulation    Risk for Readmission: Low (0-9), Moderate (10-19), High (20 or greater) - 10% Low Moderate    Justification for Continued Stay:   Criteria for continued IRF stay:  Based on my medical assessment of the patient and review of information from the interdisciplinary team, as part of this weekly team conference, the patient continues to meet the following criteria for IRF level of care:  [x] The patient requires 24-hour rehabilitation nursing care   [x] The patient requires an intensive rehabilitation therapy program  [x] The patient requires active and ongoing intervention of multiple therapy disciplines  [x] The patient requires continued physician supervision by a rehabilitation physician  [x] The patient requires an intensive and coordinated interdisciplinary team approach to the delivery of rehabilitative care    Medical Necessity-continued close physician medical management is required for:   [] Cardiac/Circulatory dysfunction  [] Respiratory/Pulmonary dysfunction  [] Integumentary complications  [] Peripheral Vascular dysfunction  [] Musculoskeletal dysfunction  [x] Neurological dysfunction d/t:  [x] CVA  [] SCI  [] TBI  [] Other: __________  [] Renal dysfunction  [] Hematologic dysfunction    [] Endocrine disorders  [] GI disorders     [] Genito-Urinary dysfunction    Assessment/Plan:  [x] The patient is making good progression towards their LTG's, is actively participating in, and has a reasonable expectation to continue to benefit from the intensive rehabilitation program.  [] The estimated discharge date has been changed from initial team conference due to:   [] The estimated discharge destination has been changed from initial team conference due to:     Rehab Team Members in attendance for Team Conference:  :  NATALIYA Patterson    Therapy Manager:  Sheila Tompkins, PT, DPT    Social Work:  Romain Paiz, MSW, [facility] CenterW[facility]S    Nursing:  SAMUEL PazN, RN  Ghada Huertas, KARISHMA    Therapy:  No Rolon, PT  Jan Cullen, PT, DPT    Millie Castillo OTR/L  Christine Hare, OTR/L  Waylon, OTR/L    Prabha Moreno, MA/CCC-SLP    Nutrition:  Emery Cornelius, RD LD    I approve the established interdisciplinary plan of care as documented within the medical record of [facility].     MD Valerie Taveras D.O. M.P.H  PM&R  5/27/2021  11:03 AM

## 2021-05-26 NOTE — PROGRESS NOTES
Alert and oriented. VSS on RA. Denying pain or discomfort. Participated in all therapies today but 3 person slide board transfers were more difficult today than yesterday. She is responding appropriately and at baseline neuro assessment, moving all extremities with increased weakness on LLE/LUE with no new changes or C/O increase weakness. She stated that she is determined to get stronger and continue to participate. Continent of B&B. Appetite good and can feed herself. Call light within reach. Bed alarm in place.

## 2021-05-26 NOTE — PLAN OF CARE
Problem: Falls - Risk of:  Goal: Will remain free from falls  Description: Will remain free from falls  Outcome: Ongoing  Note: Client remains free from falls, bed/chair alarm in place, door open, encouraged to use call light for needs, call light is within reach, bed locked in lowest position,  Will continue to monitor. Problem: Skin Integrity:  Goal: Absence of new skin breakdown  Description: Absence of new skin breakdown  5/26/2021 0016 by Estefani Angela RN  Outcome: Ongoing  Note:  See Sandoval scale. Encourage/assist pt to turn and reposition every two hours and as needed. Heels elevated off bed. Protective barrier placed as needed. Patient kept clean and dry. Pillows used for positioning. Will continue to monitor for skin breakdown.

## 2021-05-26 NOTE — PROGRESS NOTES
[facility] loss [facility]. Awaiting return call.

## 2021-05-26 NOTE — PROGRESS NOTES
Department of Physical Medicine & Rehabilitation  Progress Note    Patient Identification:  Kolby Harmon  [id number]  : 1945  Admit date: 2021    Chief Complaint: L hemiparesis due to R intracerebral hemorrhage    Subjective:   No new complaints overnight. She is feeling well today in therapy and is eating breakfast. Participating well with slight improvement in LE weakness daily. ROS: No f/c, n/v, cp. Objective:  Patient Vitals for the past 24 hrs:   BP Temp Temp src Pulse Resp SpO2   21 0845 135/83 98.2 °F (36.8 °C) Oral 76 18 95 %   21 1927 118/76 97.8 °F (36.6 °C) Oral 77 17 96 %   21 1645 116/72   76 18 97 %     Const: Alert. No distress, pleasant. HEENT: Normocephalic, atraumatic. Normal sclera/conjunctiva. MMM. CV: Regular rate and rhythm. Resp: No respiratory distress. Lungs CTAB. Abd: Soft, nontender, nondistended, NABS+   Ext: No edema. Neuro: Alert, oriented, appropriately interactive. Left neglect noted. Motor examination reveals normal strength in right side, 3-/5 strength left side diffusely. Psych: Cooperative, appropriate mood and affect    Laboratory data: Available via EMR. Last 24 hour lab  No results found for this or any previous visit (from the past 24 hour(s)). Therapy progress:  PT  Position Activity Restriction  Other position/activity restrictions: fall precautions  Objective     Sit to Stand: 2 Person Assistance (Used the [employer] to facilitate standing with pt. Max A x2 req with VC for pt to stand more erect. Steven standing ~30 secs at a time. Steven 4 stands)  Stand to sit: Maximum Assistance (Poor eccentric control with tendency to lean to L)  Bed to Chair: Dependent/Total (Performed a slide board ( SB ) with going from lower ( cong height w/c) > higher surface ( bed) req mod  A x2 leading with the R side.  Bed> w/c dependent with going from higher >lower with leading with the L  side req min A x2 with use of the SB.)     OT  PT Equipment Recommendations  Other: continue to assess, pt states that she owns a RW  Toilet - Technique: Sit pivot  Equipment Used:  (rolling shower chair)  Toilet Transfers Comments: Pt required Max Ax2 from w/c > rolling shower chair. Rolling shower chair was position to pt's R to go to pt's strong side. OT and PT first attempted transfer from w/c > RTS which was unsuccessful and pt required assist x3 to safely get pt back into w/c. Assessment        SLP          Body mass index is 31.61 kg/m². Assessment and Plan:  Right intracerebral hemorrhage with L hemiparesis  - Cont lipitor 40 mg daily, ASA 81 mg daily     UTI  UA: cloudy, large amoutn of LE, WBC 10-20, bacteria rare. - Urine culture in process  - Cont macrobid 100 mg BID (started 5/23) for 5 days    Systolic CHF (EF 63%)  - Cont coreg 25 mg BID,Lasix 20 mg daily     A Fib with ICD pacemaker  - Cont ASA (had been on Coumadin)     HTN  - Cont norvasc 5 mg daily, lisinopril 40 mg daily     HLD  - Cont lipitor 40 mg daily     Hypothyroidism  - Cont synthroid 50 mcg daily     Hx of rectal cancer s/p resection  - Colostomy present, monitor output    PPx: protonix 40 mg daily, lovenox 40 mg daily     ITP- Monitor     Bowels: Schedule Miralax + Senna S. Follow bowel movements. Enema or suppository if needed.      Bladder: Check PVR x 3.   [facility] if PVR > 200ml or if any volume is > 500 ml.      Pain: Tylenol is ordered prn.        Rehab Progress: Improving  Anticipated Dispo: SNF  Services/DME: TBD  ELOS: 6/17        Zechariah Aguirre D.O. M.P.H  PM&R  5/26/2021  8:52 AM

## 2021-05-26 NOTE — PROGRESS NOTES
Physical Therapy  Facility/Department: [facility] ACUTE REHAB UNIT  Daily Treatment Note  NAME: Lidya Anthony  : 1945  MRN: [id number]    Date of Service: 2021    Discharge Recommendations:  24 hour supervision or assist, Home with Home health PT   PT Equipment Recommendations  Other: continue to assess, pt states that she owns a RW    Assessment   Body structures, Functions, Activity limitations: Decreased functional mobility ; Decreased safe awareness;Decreased balance;Decreased cognition;Decreased vision/visual deficit; Decreased endurance;Decreased strength;Decreased sensation;Decreased fine motor control  Assessment: Pt is progressing with increased strength in LLE but struggles with transf . Movement in LES improved but has not yet translated to transf. Pt cont to maximize effort with mobility. Pt is well below baseline and would benefit from cont therapy to maximize potential and increase functional obility towards Ind to allow for a safer d/c to home. Treatment Diagnosis: decreased functional mobility due to nontraumatic ICH  Decision Making: Medium Complexity  PT Education: General Safety; Energy Conservation;Transfer Training;Orientation; Adaptive Device Training;Functional Mobility Training  Activity Tolerance  Activity Tolerance: Patient limited by fatigue;Patient limited by endurance     Patient Diagnosis(es): There were no encounter diagnoses. has a past medical history of Arthritis, CAD (coronary artery disease), Cancer ([facility] [address] Cerebral artery occlusion with cerebral infarction [facility]), [facility] Hypertension, and Thyroid disease. has a past surgical history that includes Abdomen surgery; Hysterectomy; Colonoscopy; hernia repair; and pacemaker placement. Restrictions  Position Activity Restriction  Other position/activity restrictions: fall precautions  Subjective   General  Chart Reviewed:  Yes  Additional Pertinent Hx: Pt is a 67 y.o. female admitted to ARU on 5/21/21 from SNF. Per MD note, pt originally \"admitted to [address] from 1/23/21-2/9/21 for intracerebral hemorrhage with L hemiparesis. She originally presented to the ER with left sided weakness and bp was 191/161 so she was started on Cleviprex gtt. She has a known hx of systolic CHF (EF 07%), A Fib (on warfarin), ICD, HTN, Hypothyroidism, rectal cancer s/p resection, and ITP. \"  Pt d/c to SNF on 2/9/21 and made significant progress. She now admits to ARU. Family / Caregiver Present: No  Referring Practitioner: Nichol Almanzar MD  General Comment  Comments: Pt arrived to [employer] gym via OT . Pt positioned in w/c. Pt agreeable to therapy  Pain Screening  Patient Currently in Pain: Yes (BB Knees ( only with certain movements))  Pain Assessment  Pain Level:  (Did not rate)  Vital Signs  Patient Currently in Pain: Yes (BB Knees ( only with certain movements))       Orientation  Orientation  Overall Orientation Status: Within Functional Limits (Pt presents with STM with inability to recall activities that occurred in past 24 hours.)  Cognition      Objective      Transfers  Sit to Stand: Dependent/Total (Utilized a standing table in which pt actively participated transitioning from sitting to standing)  Stand to sit: Dependent/Total (Utilized the standing frame so was dependently positioned back into sitting)  Squat Pivot Transfers: 2 Person Assistance;Dependent/Total (w/c > standing frame  (Utilized [employer] w/ transition( R side leading req Max A x2 (Lower surface > higher surface) Leading w/ L side standing frame > w/c( higher  > lower surface using beasy board)  req CGA . (dependent on placement of  beasy board)  Comment: Note, transf req additional time with each step sequenced to produce a successful transition.  Pt is able to move all 4 extrem but has decreased proprioception on the L side especially with LUE  Ambulation  Ambulation?: No  WB Status: Pt is not safe for ambulation at this time) Stairs/Curb  Stairs?: No  Wheelchair Activities  Wheelchair Size: Initiated therapy in a 22\" standard  w/c but transitioned to a 20\" cong height w/c  Wheelchair Cushion: Pressure Relieving  Level of Assistance for pressure relief activities: Supervision  Wheelchair Parts Management: Yes  Left Brakes Level of Assistance: Minimal assistance (VC to locate and fully engage the brake with physical A to manage the brake)  Right Brakes Level of Assistance: Supervision (VC to locate and fully engage the brake,)  Propulsion: Yes  Propulsion 1  Propulsion: Manual  Level: Level Tile  Method: RLE;LLE  Level of Assistance: Supervision  Description/ Details: Used BLES in a stepping pattern but req VC for positioning of LLE especially as pt fatigued and increased delay on moving LLE  Distance: 150' x1  Neuromuscular Education  Trunk Control: PT and OT worked collaboratively with pt on increasing WB through BLES via use of the standing frame. Pt's transf onto the seat req additional time. A sheet was placed on the seat of the standing table to assist with positioning( used multiple times) Pt tends to lean to the L and req repositioning for midline . Pt alicia ~10 minutes of standing with the frame but was limited by R knee pain and c/o of the chest piece on the table limiting her ability to maximize her breathing. To facilitate increased LE activation, therapy instructed pt with reaching activities both unilat and Bilat . Pt unable to assume an erect stance at this time but improved WB in LES noted. PT instructed pt with the following sitting ex;   1. TR/HR   2. LAQS   3. Marching onto a 4\" step with toe tapping  4. Alternating heel tapping on a 4\" surface  5. Trunk flex ( reaching to shoes)   Alicia 10 reps of each      PT also instructed pt with postural ex in sittin. Shoulder retractions  2. Shoulder depressions   3. Anterior pelvic tilts  4.  Cervical retractions   Alicia 10 reps of each ex    Pt returned to room and positioned in [facility] chair. Call light and phone placed within reach. Chair alarm reactivated                 G-Code     OutComes Score                                                     AM-PAC Score             Goals  Short term goals  Time Frame for Short term goals: 2 weeks  Short term goal 1: Pt will perform all bed mobility with Osei. Ongoing  Short term goal 2: Pt will perform squat pivot with MaxA. Ongoing  Short term goal 3: Pt will perform sit to/from stand with LRAD and MaxA. Ongoing  Short term goal 4: Pt will propel w/c 150' with supervision. Ongoing  Long term goals  Time Frame for Long term goals : 3-4 weeks  Long term goal 1: Pt will perform all bed mobility with supervision. Ongoing  Long term goal 2: Pt will perform squat pivot with CGA. Ongoing  Long term goal 3: Pt will perform sit to/from stand with LRAD and Osei. Ongoing  Long term goal 4: Pt will propel w/c 150' with Baltazar. Ongoing  Patient Goals   Patient goals : \"I want to walk again. \"    Plan    Plan  Times per week: 5x/week, 75 minutes a day  Current Treatment Recommendations: Strengthening, Neuromuscular Re-education, Home Exercise Program, ROM, Safety Education & Training, Balance Training, Endurance Training, Patient/Caregiver Education & Training, Functional Mobility Training, Wheelchair Mobility Training, Equipment Evaluation, Education, & procurement, Transfer Training, Gait Training  Safety Devices  Type of devices:  All fall risk precautions in place, Call light within reach, Chair alarm in place, Gait belt, Left in chair, Nurse notified (THerapy built up the L side of w/c cushion to facilitate symmetrical sitting)     Therapy Time   Individual Concurrent Group Co-treatment   Time In    Time Out 1218     1138   Minutes 40     48       Total treatment time: 80    Cynthia Hicks, YOKO

## 2021-05-26 NOTE — PROGRESS NOTES
Occupational Therapy  Facility/Department: [facility] ACUTE REHAB UNIT  Daily Treatment Note  NAME: Yeimi Varela  : 1945  MRN: [id number]    Date of Service: 2021    Discharge Recommendations:  Home with Home health OT, Home with nursing aide, 24 hour supervision or assist, Continue to assess pending progress  OT Equipment Recommendations  ADL Assistive Devices: Shower Chair with back  Other: Continue to assess for needs    Assessment   Performance deficits / Impairments: Decreased functional mobility ; Decreased cognition;Decreased ADL status; Decreased endurance;Decreased fine motor control;Decreased ROM; Decreased sensation;Decreased coordination;Decreased strength;Decreased balance;Decreased posture;Decreased safe awareness  Assessment: Pt completed UE dressing this morning w/ min A needed to don jacket. Pt demonstrated great progress w/ donning her tshirt and she did not require any assist. Pt attempted supine LE dressing today however w/ increased B knee pain noted. Pt required assist x2 for all functional transfers today w/ increased difficulty noted initiating and sequencing. Pt remains highly motivated and continues to benefit from OT. Cont OT per POC  Treatment Diagnosis: Decreased ADL status and decreased ROM 2/2 CVA    OT Education: ADL Adaptive Strategies;Transfer Training  Patient Education: transfer training- pt verb understanding but benefits from reinforcement  REQUIRES OT FOLLOW UP: Yes  Activity Tolerance  Activity Tolerance: Patient Tolerated treatment well  Safety Devices  Safety Devices in place: Yes  Type of devices: Call light within reach; Chair alarm in place; Left in chair;Nurse notified         Patient Diagnosis(es): There were no encounter diagnoses. has a past medical history of Arthritis, CAD (coronary artery disease), [facility] Cerebral artery occlusion with cerebral infarction [facility]), CHF (congestive heart failure) ([facility] Hypertension, and Thyroid disease.    has a past surgical history that includes Abdomen surgery; Hysterectomy; Colonoscopy; hernia repair; and pacemaker placement. Restrictions  Position Activity Restriction  Other position/activity restrictions: fall precautions    Subjective   General  Chart Reviewed: Yes  Patient assessed for rehabilitation services?: Yes  Additional Pertinent Hx: Per MD CHAVARRIA and P:Patient is a 66 yo female who originally admitted to hospital /23/21-2/9/21 for Right intracerebral hemorrhage with L hemiparesis. She has a know past medical hx of systolic CHF (EF 19%), A Fib (on warfarin), ICD, HTN, Hypothyroidism, rectal cancer s/p resection, and ITP. Her hemorrhage became worse with a midline shift and decline in her neurologic status. Patient was started on seizure prophalxis, and neurosurgery followed, but no indication for surgery. She was also treated for UTI, anti-hypertensives adjusted to maintain bp in normal range  with ICH. She failed MBS and GI consulted who recommended PEG. She was also treated with zosyn for possible aspiration pneumonia. S/P PEG placed on 2/6/21. EGD recommended PPI therapy. She remained stable and was discharged to [facility]. Patient presented to SNF on 2/9/21. She improved with her swallowing and tolerated a regular diet,  and GI removed PEG on 5/18/21. Pt had been at the [facility] since February and has now exhausted her days left at the SNF. Pt admitted to ARU 5/21  Family / Caregiver Present: No  Referring Practitioner: Dr. Holly Gil  Diagnosis: CVA  Subjective  Subjective: Pt was asleep in bed upon arrival. Upon awakening pt was pleasant and agreeable to OT. Pt reported she felt like she needed to use the bathroom. Vital Signs  Patient Currently in Pain: Yes (Pt c/o B knee pain when flexing knees for LE dressing. Pt reported that pain subsided when she was not in this position.  RN aware)     Orientation  Orientation  Overall Orientation Status: Impaired  Orientation Level: Oriented to person;Oriented to situation;Oriented to place; Disoriented to time (Pt was oriented to month but not aware of exact date)    Objective    ADL  Feeding: Setup (OT assisted w/ opening packages and containers)  UE Dressing: Verbal cueing;Setup; Increased time to complete;Minimal assistance (Pt threaded BUEs into tshirt w/ VCs to thread LUE first. Pt was able to pull shirt down in the back w/o assist. Pt donned jacket w/ min A needed to pull jacket around back. ++ time and mod VCs needed for proper donning of jacket.)  LE Dressing: Setup;Verbal cueing; Increased time to complete;Maximum assistance (See below for more comments)  Toileting: Maximum assistance (See below for more comments)  Additional Comments: Pt reported that she needed to use the bathroom upon OT arrival. Pt's socks and shoes were donned supine in bed in preparation to transfer to [facility]. Pt assisted w/ removal of depends supine in bed however pt required max VCs and max A to doff completely. Pt was not attending to her L side to see the depends and required max VCs to turn her head completely. Pt was assisted from EOB to [facility] w/ slide board. Pt urinated 2x while seated on BSC and was able to complete front marilyn care w/ setup assist. Pt was assisted back to bed for LE dressing since pt reported she didn't feel comfortable on the [facility]. While supine in bed pt completed front marilyn care again and was able to thoroughly clean herself. Pt was able to complete figure 4 supine in bed to doff shoes. Pt attempted figure 4 to thread BLEs into underwear and pants however pt could not sustain this position for enough time in order to thread clothing. Pt stated this position was too painful on her knees. OT assisted w/ threading both underwear and pants and then pt was able to roll L and R to assist w/ pulling clothing over her hips. Pt could successfully pull clothes up on the R but could not on the L due to impaired coordination w/ LUE.  Pt had increased difficulty initiating and sequencing some  Sequencing: Requires cues for some  Cognition Comment: Pt required increased cues for initiation and sequencing ADLs and transfers today. Pt reported feeling \"a little out of it\"-RN notified. Perception  Unilateral Attention: Cues to attend left visual field;Cues to attend to left side of body  Initiation: Cues to initiate tasks                                    2nd session: Pt was seated in w/c upon arrival. Pt was pleasant and agreeable to OT/PT co-treat. OT/PT co treat indicated to maximize functional independence. OT assisted pt in w/c to therapy gym. Functional transfers:  Beasy board- from w/c > standing frame w/ max Ax2. From standing frame > w/c w/ Min Ax1 + CGAx1. Pt required increased VCs for initiation and sequencing of transfers. Pt c/o B knee pain w/ transfers. Standing frame:  Pt was assisted into the standing frame in order to promote weight bearing through BUEs and BLEs and to improve trunk control. Once seated on standing frame seat, pt was able to adjust BLEs into foot plates of device. Pt required min-mod A to adjust hips properly onto the seat for midline orientation. Once properly situated, pt used her RUE to push lever into upright position. Pt achieved ~150 degrees and was instructed to stand up the remaining distance from seated position. Pt could not extend BLEs in order to push herself up into standing position. OT and PT provided VCs and TCs to cue pt for improved stance however pt still could not complete on her own. Pt pushed the lever into ~170 degrees extension. Pt maintained this position for ~10 mins. While in standing weight bearing position pt was instructed to weight shift L and R and to reach outside of JHOAN to facilitate midline orientation as pt was leaning excessively to the L. Pt reached w/ RUE to retrieve 6 cones w/ ++ time and effort noted. Pt required max VCs and TCs for scapular retraction and neck extension to prevent forward flexed posture.  A mirror was placed in front of pt to self correct posture. Pt c/o increased B knee pain (R worse than L) in stance and could not tolerate position for extended time. Pt was assisted back into full seated position in standing frame. Pt was then assisted from standing frame to w/c w/ beasy board. Pt was left in w/c w/ PT present to continue session.                                Plan   Plan  Times per week: 5x a week for 75 mins daily  Times per day: Daily  Current Treatment Recommendations: Strengthening, Wheelchair Mobility Training, Positioning, ROM, Safety Education & Training, Balance Training, Patient/Caregiver Education & Training, Self-Care / ADL, Cognitive/Perceptual Training, Functional Mobility Training, Neuromuscular Re-education, Equipment Evaluation, Education, & procurement, Home Management Training, Endurance Training  G-Code     OutComes Score                                                  AM-PAC Score             Goals  Short term goals  Time Frame for Short term goals: 2 weeks  Short term goal 1: Pt will complete toilet transfer w/ Mod A-ongoing  Short term goal 2: Pt will complete LE dressing supine in bed w/ Min A-ongoing  Short term goal 3: Pt will complete UE dressing w/ Min A-goal met 5/25  Short term goal 4: Pt will complete bathing tasks w/ Min A-ongoing  Short term goal 5: Pt will demonstrate improved functional use of LUE as evident by pt ability to open grooming containers I'ly-ongoing  Long term goals  Time Frame for Long term goals : 4 weeks- all goals ongoing  Long term goal 1: Pt will complete toilet transfer w/ CGA  Long term goal 2: Pt will complete toileting tasks w/ CGA  Long term goal 3: Pt will complete LE dressing (either supine or bed or seated ) w/ CGA  Long term goal 4: Pt will complete UE dressing w/ setup and spvn  Long term goal 5: Pt will complete bathing tasks w/ SBA  Patient Goals   Patient goals : \"be able to walk\"       Therapy Time   Individual Concurrent Group Co-treatment   Time In 0730         Time Out 0830         Minutes 60         Timed Code Treatment Minutes: 60 Minutes        Second Session Therapy Time:   Individual Concurrent Group Co-treatment   Time In        [facility]        5021   Minutes        48     Timed Code Treatment Minutes:  48    Total Treatment Minutes:  60+48= [facility]

## 2021-05-26 NOTE — PROGRESS NOTES
ACUTE REHAB UNIT  SPEECH/LANGUAGE PATHOLOGY      [x] Daily  [] Weekly Care Conference Note  [] Discharge    Patient: Maxx Goldberg      :1945  TQV:[id number]  Rehab Dx/Hx: 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, unspecified ([facility] [address] [I61.2]    Precautions: [] Aspiration  [x] Fall risk  [] Sternal  [] Seizure [] Hip  [] Weight Bearing [] Other  ST Dx: [] Aphasia  [] Dysarthria  [] Apraxia   [] Oropharyngeal dysphagia [x] Cognitive Impairment  [] Other:   Date of Admit: 2021  Room #: 3104/3104-01  Date: 2021          Current Diet Order:DIET GENERAL;  Dietary Nutrition Supplements: Standard High Calorie Oral Supplement   Vision  Vision: Impaired  Vision Exceptions: Wears glasses at all times  Hearing  Hearing: Within functional limits     Comments: Per admitting H&P (2021): 'Patient is a 66 yo female who originally admitted to hospital /-21 for Right intracerebral hemorrhage with L hemiparesis. She has a know past medical hx of systolic CHF (EF 72%), A Fib (on warfarin), ICD, HTN, Hypothyroidism, rectal cancer s/p resection, and ITP. Her hemorrhage became worse with a midline shift and decline in her neurologic status. Patient was started on seizure prophalxis, and neurosurgery followed, but no indication for surgery. She was also treated for UTI, anti-hypertensives adjusted to maintain bp in normal range  with ICH. She failed MBS and GI consulted who recommended PEG. She was also treated with zosyn for possible aspiration pneumonia. S/P PEG placed on 21. EGD recommended PPI therapy. She remained stable and was discharged to [facility]. Patient presented to SNF on 21. She improved with her swallowing and tolerated a regular diet,  and GI removed PEG on 21. Patient has made significant progress in her alertness and activity tolerance, and it is felt that she would benefit from and could tolerate intensive inpatient rehab unit treatment at this time.   Prior to her ICH, she was independent will all ADLs and functional mobility without any AD. She is currently supervision for bed mobility, Max A for stand pivot transfer, Osei for UE dress, MaxA for LB dressing, Osei for slide board transfers, and supervision for WC mobility. She is highly motivated and able to participate in 3 hrs of therapy per day in ARU setting. '  Social/Functional History  Lives With: Spouse;Daughter  Active : Yes  Occupation: Unemployed  [address]: Enjoys going to the [employer]  Additional Comments: Manages own medications, finances    Barriers toward progress: medical status (current UTI)    Date: 5/26/2021     Tx session 1   Total Timed Code Min 30   Total Treatment Minutes 30   Individual Treatment Minutes 30   Group Treatment Minutes 0   Co-Treat Minutes 0   Brief Exception: N/A   Pain None indicated   Pain Intervention: [] RN notified  [] Repositioned  [] Intervention offered and patient declined  [x] N/A  [] Other:   Subjective:     Pt upright in chair and extremely pleasant and appreciative throughout session. Objective / Goals:    Patient will participate in ongoing cognitive-linguistic assessment. GOAL MET     Patient will complete tasks involving executive function skills with 90% accuracy/min cues. Functional problem solving re: transfers - min cues   Patient will complete graded recall tasks with 85% accuracy or min cues. Association - paired black and white line drawings (object to person): 100% accuracy after 5 minute delay with no cues, mildly increased time. Pt independently recalled timer rationale at end of 5 minutes and able to verbalize 3/5 associations independently. Min cues for recall of daily events; continued confusion re: discipline specific activities and titles   Other areas targeted:    Education:   Education ongoing regarding rationale for tasks this date. Educated on memory components and association strategy.       Safety Devices: [x] Call light

## 2021-05-27 LAB
ANION GAP SERPL CALCULATED.3IONS-SCNC: 10 MMOL/L (ref 3–16)
BASOPHILS ABSOLUTE: 0 K/UL (ref 0–0.2)
BASOPHILS RELATIVE PERCENT: 0.4 %
BUN BLDV-MCNC: 11 MG/DL (ref 7–20)
CALCIUM SERPL-MCNC: 9.4 MG/DL (ref 8.3–10.6)
CHLORIDE BLD-SCNC: 101 MMOL/L (ref 99–110)
CO2: 25 MMOL/L (ref 21–32)
CREAT SERPL-MCNC: <0.5 MG/DL (ref 0.6–1.2)
EOSINOPHILS ABSOLUTE: 0.3 K/UL (ref 0–0.6)
EOSINOPHILS RELATIVE PERCENT: 3.6 %
GFR AFRICAN AMERICAN: >60
GFR NON-AFRICAN AMERICAN: >60
GLUCOSE BLD-MCNC: 130 MG/DL (ref 70–99)
HCT VFR BLD CALC: 37.9 % (ref 36–48)
HEMOGLOBIN: 12.9 G/DL (ref 12–16)
LYMPHOCYTES ABSOLUTE: 0.5 K/UL (ref 1–5.1)
LYMPHOCYTES RELATIVE PERCENT: 6.9 %
MCH RBC QN AUTO: 31.3 PG (ref 26–34)
MCHC RBC AUTO-ENTMCNC: 34.1 G/DL (ref 31–36)
MCV RBC AUTO: 91.7 FL (ref 80–100)
MONOCYTES ABSOLUTE: 0.5 K/UL (ref 0–1.3)
MONOCYTES RELATIVE PERCENT: 6.2 %
NEUTROPHILS ABSOLUTE: 6 K/UL (ref 1.7–7.7)
NEUTROPHILS RELATIVE PERCENT: 82.9 %
PDW BLD-RTO: 14.9 % (ref 12.4–15.4)
PLATELET # BLD: 216 K/UL (ref 135–450)
PMV BLD AUTO: 7.8 FL (ref 5–10.5)
POTASSIUM SERPL-SCNC: 4 MMOL/L (ref 3.5–5.1)
RBC # BLD: 4.14 M/UL (ref 4–5.2)
SODIUM BLD-SCNC: 136 MMOL/L (ref 136–145)
WBC # BLD: 7.2 K/UL (ref 4–11)

## 2021-05-27 PROCEDURE — 36415 COLL VENOUS BLD VENIPUNCTURE: CPT

## 2021-05-27 PROCEDURE — 85025 COMPLETE CBC W/AUTO DIFF WBC: CPT

## 2021-05-27 PROCEDURE — 80048 BASIC METABOLIC PNL TOTAL CA: CPT

## 2021-05-27 PROCEDURE — 6360000002 HC RX W HCPCS: Performed by: PHYSICAL MEDICINE & REHABILITATION

## 2021-05-27 PROCEDURE — 99233 SBSQ HOSP IP/OBS HIGH 50: CPT | Performed by: PHYSICAL MEDICINE & REHABILITATION

## 2021-05-27 PROCEDURE — 97130 THER IVNTJ EA ADDL 15 MIN: CPT | Performed by: SPEECH-LANGUAGE PATHOLOGIST

## 2021-05-27 PROCEDURE — 6370000000 HC RX 637 (ALT 250 FOR IP): Performed by: PHYSICAL MEDICINE & REHABILITATION

## 2021-05-27 PROCEDURE — 97530 THERAPEUTIC ACTIVITIES: CPT | Performed by: PHYSICAL THERAPIST

## 2021-05-27 PROCEDURE — 97530 THERAPEUTIC ACTIVITIES: CPT

## 2021-05-27 PROCEDURE — 83735 ASSAY OF MAGNESIUM: CPT

## 2021-05-27 PROCEDURE — 1280000000 HC REHAB R&B

## 2021-05-27 PROCEDURE — 97535 SELF CARE MNGMENT TRAINING: CPT

## 2021-05-27 PROCEDURE — 97129 THER IVNTJ 1ST 15 MIN: CPT | Performed by: SPEECH-LANGUAGE PATHOLOGIST

## 2021-05-27 RX ADMIN — Medication 5 MG: at 21:42

## 2021-05-27 RX ADMIN — ASPIRIN 81 MG: 81 TABLET, CHEWABLE ORAL at 08:44

## 2021-05-27 RX ADMIN — FUROSEMIDE 20 MG: 20 TABLET ORAL at 08:47

## 2021-05-27 RX ADMIN — CARVEDILOL 25 MG: 12.5 TABLET, FILM COATED ORAL at 17:46

## 2021-05-27 RX ADMIN — MAGNESIUM OXIDE TAB 400 MG (240 MG ELEMENTAL MG) 400 MG: 400 (240 MG) TAB at 17:40

## 2021-05-27 RX ADMIN — PREDNISONE 7.5 MG: 5 TABLET ORAL at 08:45

## 2021-05-27 RX ADMIN — Medication 2000 UNITS: at 08:44

## 2021-05-27 RX ADMIN — CITALOPRAM HYDROBROMIDE 20 MG: 20 TABLET ORAL at 08:47

## 2021-05-27 RX ADMIN — ENOXAPARIN SODIUM 40 MG: 40 INJECTION SUBCUTANEOUS at 08:53

## 2021-05-27 RX ADMIN — POTASSIUM CHLORIDE 20 MEQ: 20 TABLET, EXTENDED RELEASE ORAL at 08:47

## 2021-05-27 RX ADMIN — AMOXICILLIN AND CLAVULANATE POTASSIUM 1 TABLET: 875; 125 TABLET, FILM COATED ORAL at 08:44

## 2021-05-27 RX ADMIN — MAGNESIUM OXIDE TAB 400 MG (240 MG ELEMENTAL MG) 400 MG: 400 (240 MG) TAB at 13:30

## 2021-05-27 RX ADMIN — LISINOPRIL 40 MG: 40 TABLET ORAL at 08:44

## 2021-05-27 RX ADMIN — LEVOTHYROXINE SODIUM 50 MCG: 0.05 TABLET ORAL at 06:25

## 2021-05-27 RX ADMIN — AMOXICILLIN AND CLAVULANATE POTASSIUM 1 TABLET: 875; 125 TABLET, FILM COATED ORAL at 21:42

## 2021-05-27 RX ADMIN — AMLODIPINE BESYLATE 5 MG: 5 TABLET ORAL at 08:47

## 2021-05-27 RX ADMIN — PANTOPRAZOLE SODIUM 40 MG: 40 TABLET, DELAYED RELEASE ORAL at 06:24

## 2021-05-27 RX ADMIN — MAGNESIUM OXIDE TAB 400 MG (240 MG ELEMENTAL MG) 400 MG: 400 (240 MG) TAB at 08:45

## 2021-05-27 RX ADMIN — MAGNESIUM OXIDE TAB 400 MG (240 MG ELEMENTAL MG) 400 MG: 400 (240 MG) TAB at 21:42

## 2021-05-27 RX ADMIN — ATORVASTATIN CALCIUM 40 MG: 40 TABLET, FILM COATED ORAL at 21:42

## 2021-05-27 RX ADMIN — CARVEDILOL 25 MG: 12.5 TABLET, FILM COATED ORAL at 08:45

## 2021-05-27 ASSESSMENT — PAIN SCALES - GENERAL
PAINLEVEL_OUTOF10: 0

## 2021-05-27 NOTE — PROGRESS NOTES
Patient is in bed and resting at this time, vitals stable, no pain at this time. Was incontinent of bladder and then used bed pan. Call light with in reach and bed alarm on.

## 2021-05-27 NOTE — PROGRESS NOTES
surgical history that includes Abdomen surgery; Hysterectomy; Colonoscopy; hernia repair; and pacemaker placement. Restrictions  Position Activity Restriction  Other position/activity restrictions: fall precautions  Subjective   General  Chart Reviewed: Yes  Additional Pertinent Hx: Pt is a 67 y.o. female admitted to ARU on 5/21/21 from SNF. Per MD note, pt originally \"admitted to [address] from 1/23/21-2/9/21 for intracerebral hemorrhage with L hemiparesis. She originally presented to the ER with left sided weakness and bp was 191/161 so she was started on Cleviprex gtt. She has a known hx of systolic CHF (EF 51%), A Fib (on warfarin), ICD, HTN, Hypothyroidism, rectal cancer s/p resection, and ITP. \"  Pt d/c to SNF on 2/9/21 and made significant progress. She now admits to ARU. Family / Caregiver Present: No  Referring Practitioner: Don Enamorado MD  Subjective  Subjective: Pt immediately reported that \"she needed to go to the bathroom\"  General Comment  Comments: Pt supine in bed when PT arrived. Pt agreeable to therapy  Pain Screening  Patient Currently in Pain: Yes (BB Knees ( only with certain movements))  Vital Signs  Patient Currently in Pain: Yes (BB Knees ( only with certain movements))       Orientation  Orientation  Overall Orientation Status: Within Functional Limits (Pt presents with STM with inability to recall activities that occurred in past 24 hours.)  Cognition      Objective    PT and OT worked collaboratively to utilize the skills of 2 disciplines while maximizing functional mobility to obtain optimal potential.   Bed mobility  Supine to Sit: Minimal assistance (Facilitation for trunk ascension.  VCs needed  for technique)  Transfers:  Attempted 6 x unsuccessfully to clear surface to manage LB clothing  Sit to Stand: Dependent/Total (Utilized a gary stedy in which pt actively participated transitioning from sitting to standing)  Stand to sit: Dependent/Total (Utilized the [place] quinton  so was dependently positioned back into sitting)  Comment: Note, transf req additional time with each step sequenced to produce a successful transition. Pt is able to move all 4 extrem but has decreased proprioception on the L side especially with LUE  Ambulation: No   Comment: Pt wearing gown when therapy arrived. For time management, therapy assisted pt with doffing gown. Pt was able to urinate on bedside commode and was set up for pericare. For time management, therapy assisted pt with donning socks, shoes, briefs, pants and shirt. LB clothing management req considerable additional time 2/2 to challenges with clearing hips from surface to manage LB clothing management        Second session: Pt sitting in w/c when therapy arrived. Pt agreeable to therapy. PT and OT worked collaboratively to utilize the skills of 2 disciplines while maximizing functional mobility to obtain optimal potential.   Therapy collaborated with pt regarding obtaining a consistent transf method for all staff to do to so that pt optimizes transf training and pt feels safe. Pt concurred. Treatment initiated with pt propelling w/c to the therapy gym. However, as treatment was initiated, it was noted that pt's colostomy bag was leaking. Pt transported back to her room. Deb Itz quinton used to move w/c<> bed. Bed mobility  Sit > supine CGA ( VCs neeed for technique and placement of L extrem)  Supine> sitting on EOB req CGA/ min A for trunk ascension . Pt used the bedrails   Supine> sidelying in both directions used multiple times for LB dressing. ( Refer to OT note for dressing status)   Transfers:   STS with use of the gary stedy ( dependent req max A x2. Pt able to assist )   Bed<>w/c Dependent with use of gary stedy (max A x2.)  Comment: Note, transf req additional time with each step sequenced to produce a successful transition.  Pt is able to move all 4 extrem but has decreased proprioception on the L side especially with LUE Ambulation  Ambulation?: No  WB Status: Pt is not safe for ambulation at this time)  Stairs/Curb  Stairs?: No  Wheelchair Activities  Wheelchair Size: Initiated therapy in a 22\" standard  w/c but transitioned to a 20\" cong height w/c  Wheelchair Cushion: Pressure Relieving  Level of Assistance for pressure relief activities: Supervision  Wheelchair Parts Management: Yes  Left Brakes Level of Assistance: Minimal assistance (VC to locate and fully engage the brake with physical A to manage the brake)  Right Brakes Level of Assistance: Supervision (VC to locate and fully engage the brake,)  Propulsion: Yes  Propulsion 1  Propulsion: Manual  Level: Level Tile  Method: RLE;LLE  Level of Assistance: Supervision  Description/ Details: Used BLES in a stepping pattern but req VC for positioning of LLE especially as pt fatigued and increased delay on moving LLE  Distance: 150' x1  Pt's shoes, briefs and pants changed req additional time . the patient remained in w/c with call light and phone placed within reach. Chair alarm activated. G-Code     OutComes Score                                                     AM-PAC Score             Goals  Short term goals  Time Frame for Short term goals: 2 weeks  Short term goal 1: Pt will perform all bed mobility with Osei. Ongoing  Short term goal 2: Pt will perform squat pivot with MaxA. Ongoing  Short term goal 3: Pt will perform sit to/from stand with LRAD and MaxA. Ongoing  Short term goal 4: Pt will propel w/c 150' with supervision. Ongoing  Long term goals  Time Frame for Long term goals : 3-4 weeks  Long term goal 1: Pt will perform all bed mobility with supervision. Ongoing  Long term goal 2: Pt will perform squat pivot with CGA. Ongoing  Long term goal 3: Pt will perform sit to/from stand with LRAD and Osei. Ongoing  Long term goal 4: Pt will propel w/c 150' with [place]. Ongoing  Patient Goals   Patient goals : \"I want to walk again. \"    Plan    Plan  Times per week: 5x/week, 75 minutes a day  Current Treatment Recommendations: Strengthening, Neuromuscular Re-education, Home Exercise Program, ROM, Safety Education & Training, Balance Training, Endurance Training, Patient/Caregiver Education & Training, Functional Mobility Training, Wheelchair Mobility Training, Equipment Evaluation, Education, & procurement, Transfer Training, Gait Training  Safety Devices  Type of devices:  All fall risk precautions in place, Call light within reach, Chair alarm in place, Gait belt, Left in chair, Nurse notified (THerapy built up the L side of w/c cushion to facilitate symmetrical sitting)     Therapy Time   Individual Concurrent Group Co-treatment   Time In 1004     1011   Time Out 4462     8865   Minutes 7     34     Second Session Therapy Time:   Individual Concurrent Group Co-treatment   Time In        1245   Time Out        1350   Minutes        65     Timed Code Treatment Minutes:  6+83+96    Total Treatment Minutes:  [address] [facility],

## 2021-05-27 NOTE — PROGRESS NOTES
Department of Physical Medicine & Rehabilitation  Progress Note    Patient Identification:  Paulette Alvarez  [id number]  : 1945  Admit date: 2021    Chief Complaint: L hemiparesis due to R intracerebral hemorrhage    Subjective:   No new complaints overnight. Pt seen sleeping in bed, easily wakes up. She is feeling fine, no issues. Participating well with slight improvement in LE weakness daily. ROS: No f/c, n/v, cp. Objective:  Patient Vitals for the past 24 hrs:   BP Temp Temp src Pulse Resp SpO2   21 0845 127/68 98.3 °F (36.8 °C) Oral 72 18 94 %   21 2228 136/76 98.6 °F (37 °C) Oral 73 18 96 %   21 1730 120/74   86  93 %     Const: Alert. No distress, pleasant. HEENT: Normocephalic, atraumatic. Normal sclera/conjunctiva. MMM. CV: Regular rate and rhythm. Resp: No respiratory distress. Lungs CTAB. Abd: Soft, nontender, nondistended, NABS+   Ext: No edema. Neuro: Alert, oriented, appropriately interactive. Left neglect noted. Motor examination reveals normal strength in right side, 3-/5 strength left side diffusely. Psych: Cooperative, appropriate mood and affect    Laboratory data: Available via EMR. Last 24 hour lab  No results found for this or any previous visit (from the past 24 hour(s)). Therapy progress:  PT  Position Activity Restriction  Other position/activity restrictions: fall precautions  Objective     Sit to Stand: Dependent/Total (Utilized a standing table in which pt actively participated transitioning from sitting to standing)  Stand to sit: Dependent/Total (Utilized the standing frame so was dependently positioned back into sitting)  Bed to Chair: Dependent/Total (Performed a slide board ( SB ) with going from lower ( cong height w/c) > higher surface ( bed) req mod  A x2 leading with the R side.  Bed> w/c dependent with going from higher >lower with leading with the L  side req min A x2 with use of the SB.)     OT  PT Equipment Recommendations Other: continue to assess, pt states that she owns a RW  Toilet - Technique: Lateral (slide board)  Equipment Used: Standard bedside commode  Toilet Transfers Comments: Min Ax2 from EOB > BSC; Max Ax2 from [facility] to EOB. Assessment        SLP          Body mass index is 31.61 kg/m². Assessment and Plan:  Right intracerebral hemorrhage with L hemiparesis  - Cont lipitor 40 mg daily, ASA 81 mg daily     UTI  UA: cloudy, large amoutn of LE, WBC 10-20, bacteria rare. - Urine culture in process  - Cont augmentin 875-125 mg BID     Systolic CHF (EF 07%)  - Cont coreg 25 mg BID,Lasix 20 mg daily     A Fib with ICD pacemaker  - Cont ASA (had been on Coumadin)     HTN  - Cont norvasc 5 mg daily, lisinopril 40 mg daily     HLD  - Cont lipitor 40 mg daily     Hypothyroidism  - Cont synthroid 50 mcg daily     Hx of rectal cancer s/p resection  - Colostomy present, monitor output    PPx: protonix 40 mg daily, lovenox 40 mg daily     ITP- Monitor     Bowels: Schedule Miralax + Senna S. Follow bowel movements. Enema or suppository if needed.      Bladder: Check PVR x 3. IMC if PVR > 200ml or if any volume is > 500 ml.      Pain: Tylenol is ordered prn.        Rehab Progress: Improving  Anticipated Dispo: SNF  Services/DME: TBD  ELOS: 6/17    Sagrario Logan MD, PGY1  Will staff with attending.     CHARLETTE Bennett.O. M.P.H  PM&R  5/27/2021  9:00 AM

## 2021-05-27 NOTE — PROGRESS NOTES
Occupational Therapy  Facility/Department: [facility] ACUTE REHAB UNIT  Daily Treatment Note  NAME: Maxx Goldberg  : 1945  MRN: [id number]    Date of Service: 2021    Discharge Recommendations:  Home with Home health OT, Home with nursing aide, 24 hour supervision or assist, Continue to assess pending progress  OT Equipment Recommendations  ADL Assistive Devices: Shower Chair with back  Other: Continue to assess for needs    Assessment   Performance deficits / Impairments: Decreased functional mobility ; Decreased cognition;Decreased ADL status; Decreased endurance;Decreased fine motor control;Decreased ROM; Decreased sensation;Decreased coordination;Decreased strength;Decreased balance;Decreased posture;Decreased safe awareness  Assessment: Pt continues to demonstrate increased motivation and she is very engaged in OT sessions. Pt unfortunately has not demonstrated success w/ various transfer techniques, including pivot transfers, slide board transfers, or gary stedy. Pt is able to use the slide board w/ Min Ax2 when completing from a high to a low surface however the slide board has not been consistently the best option and often requires max Ax2. The gary stedy was used today w/ max Ax2 however pt appeared to initiate more w/ stedy than other techniques. Pt has had increased difficulty engaging BLEs to assist w/ transfers which appears to be largely related to B knee pain. Pt is also limited due to proprioception deficits to LUE and LLE. Pt continues to work very hard and is highly motivated. Pt continues to benefit from OT. Cont OT per POC  Treatment Diagnosis: Decreased ADL status and decreased ROM 2/2 CVA    OT Education: Precautions; ADL Adaptive Strategies;Transfer Training  Patient Education: pt verb understanding but benefits from reinforcement due to poor recall  REQUIRES OT FOLLOW UP: Yes  Activity Tolerance  Activity Tolerance: Patient limited by fatigue  Activity Tolerance: Pt w/ increased difficulty w/ transfers this morning. Pt appeared more fatigued  Safety Devices  Safety Devices in place: Yes  Type of devices: Call light within reach; Chair alarm in place; Left in chair;Nurse notified         Patient Diagnosis(es): There were no encounter diagnoses. has a past medical history of Arthritis, CAD (coronary artery disease), Cancer ([facility] [address] Cerebral artery occlusion with cerebral infarction [facility]), CHF (congestive heart failure) ([facility] [address] Hypertension, and Thyroid disease. has a past surgical history that includes Abdomen surgery; Hysterectomy; Colonoscopy; hernia repair; and pacemaker placement. Restrictions  Position Activity Restriction  Other position/activity restrictions: fall precautions  Subjective   General  Chart Reviewed: Yes  Patient assessed for rehabilitation services?: Yes  Additional Pertinent Hx: Per MD AURA and P:Patient is a 68 yo female who originally admitted to [facility] /23/21-2/9/21 for Right intracerebral hemorrhage with L hemiparesis. She has a know past medical hx of systolic CHF (EF 40%), A Fib (on warfarin), ICD, HTN, Hypothyroidism, rectal cancer s/p resection, and ITP. Her hemorrhage became worse with a midline shift and decline in her neurologic status. Patient was started on seizure prophalxis, and neurosurgery followed, but no indication for surgery. She was also treated for UTI, anti-hypertensives adjusted to maintain bp in normal range  with ICH. She failed MBS and GI consulted who recommended PEG. She was also treated with zosyn for possible aspiration pneumonia. S/P PEG placed on 2/6/21. EGD recommended PPI therapy. She remained stable and was discharged to [facility]. Patient presented to SNF on 2/9/21. She improved with her swallowing and tolerated a regular diet,  and GI removed PEG on 5/18/21. Pt had been at the [facility] since February and has now exhausted her days left at the SNF.  Pt admitted to ARU 5/21  Family / Caregiver Present: No  Referring Practitioner:  Heis  Diagnosis: CVA  Subjective  Subjective: Pt was semi supine in bed upon arrival. Pt reported she needed to use the bathroom. Pt appeared more tired this morning. Pt agreeable to OT/PT. Co treat indicated to maximize functional independence. Vital Signs  Patient Currently in Pain: Yes (Pt c/o B knee pain w/ movement. RN notified)   Orientation     Objective    ADL  UE Dressing: Setup;Verbal cueing (Pt donned tshirt w/ VCs for cong technique)  LE Dressing: Dependent/Total (Due to time constraints OT and PT assisted w/ threading underwear and pants. Assist x2 needed in stance to pull clothes over hips)  Toileting: Dependent/Total (assist x2 needed in stance to pull clothes over hips)  Additional Comments: Pt reported urgen need to use the bathroom this morning. Pt was assisted from B > [facility] w/ slide board. Pt was continent of urine seated on toilet and was able to assist w/ front marilyn care. OT and PT assisted pt from [facility] to w/c w/ slide board. OT and PT attempted partial stance from w/c to complete pants management however pt was unable to initiate through BLEs to achieve partial stance. OT and PT attempted 6x before stance was deemed unsafe due to BLE weakness and poor initiation. OT and PT assisted pt from w/c to [address] [place] [address]. Pt required max Ax2 to achieve stance. Once in stance at [address] [place] [address] paddles OT and PT were able to assist pt w/ clothing management over hips. Pt appeared to have increased difficulty following commands today and she appeared more lethargic (RN aware). Balance  Standing Balance: Dependent/Total (Stance in [facility])  Standing Balance  Time: ~1 min total  Activity: Stance in [facility] for pants management  Comment: Samira alcantara was padded to prevent B knee pain  Toilet Transfers  Toilet - Technique: To right; To left (slide board)  Equipment Used: Standard bedside commode  Toilet Transfer: 2 Person assistance  Toilet Transfers Comments: Min Ax2 from EOB > BSC; Mod Ax2 from [facility] to w/c    Bed mobility  Supine to Sit: Minimal assistance (Facilitation at trunk. VCs neeed for technique)  Transfers  Slide Board: 2 Person assistance (Min Ax2 from EOB > w/c; Mod Ax2 from [facility] > w/c. Pt w/ poor ability to initiate through BLEs today. Max VCs needed for technique)  Sit to stand: 2 Person assistance (Max Ax2 from w/c to [address] [place] [address])  Stand to sit: Dependent/Total (Max Ax2 from [address] [place] [address] to w/c)                         Cognition  Arousal/Alertness: Appropriate responses to stimuli  Following Commands: Follows multistep commands with increased time; Follows multistep commands with repitition  Attention Span: Attends with cues to redirect  Memory: Decreased recall of recent events;Decreased short term memory  Safety Judgement: Decreased awareness of need for assistance  Problem Solving: Assistance required to generate solutions  Insights: Fully aware of deficits  Initiation: Requires cues for some  Sequencing: Requires cues for some  Cognition Comment: Pt required increased cues for initiation and sequencing ADLs and transfers today. Pt appeared more tired this morning (RN notified)       Perception  Unilateral Attention: Cues to attend left visual field;Cues to attend to left side of body  Initiation: Cues to initiate tasks                          2nd session: Pt was seated in w/c upon arrival. Pt was pleasant and agreeable to OT/PT co treat to maximize functional independence. Functional mobility: Pt self propelled w/c from room to therapy gym w/ spvn. Min VCs needed to unlock brakes successfully. Functional transfers:  W/c< >EOB- pt was assisted from w/c to EOB and EOB > w/c w/ use of gary stedy. Pt required max Ax2 in order to achieve upright posture in stedy. Pt was able to engage minimally through BLEs however w/ increased difficulty w/ extending through [address]. Bed mobility:  Rolling L- SBA w/ use of bed rail  Rolling R- CGA w/ VCs to bend LLE and use of bed rail.  CGA needed for increased trunk rotation  Sit > supine- CGA w/ VCs for technique  Supine > sit- CGA w/ assistance at trunk     ADLs:  Toileting/LE dressing-During therapy session pt's colostomy bag appeared to be leaking. Pt was assisted from the gym back to the room in order for RN to address changing of ostomy bag. While supine on bed for dressing change pt reported urgent need to use the bathroom. Pt was assisted onto the bed pan since RN was not done w/ changing ostomy bag. Pt was able to assist w/ front marilyn care however pt required mod VCs to successfully engage in this task. Pt appeared to demonstrated R gaze preference during marilyn care and required VCs to attend to L side to clean thoroughly enough. OT and PT assisted w/ removal of soiled pants and underwear and assisted w/ donning clean underwear and pants. Once clothing was threaded onto BLEs pt rolled L and R to assist w/ pants management. Pt was able to pull on the R w/ VCs for technique. Pt attempted on the L but stated she could not feel anything and had no awareness of where her LUE was located. Increased time needed for toileting tasks. Pt was left seated in w/c at EOS w/ chair alarm on and call light within reach.                Plan   Plan  Times per week: 5x a week for 75 mins daily  Times per day: Daily  Current Treatment Recommendations: Strengthening, Wheelchair Mobility Training, Positioning, ROM, Safety Education & Training, Balance Training, Patient/Caregiver Education & Training, Self-Care / ADL, Cognitive/Perceptual Training, Functional Mobility Training, Neuromuscular Re-education, Equipment Evaluation, Education, & procurement, Home Management Training, Endurance Training  G-Code     OutComes Score                                                  AM-PAC Score             Goals  Short term goals  Time Frame for Short term goals: 2 weeks  Short term goal 1: Pt will complete toilet transfer w/ Mod A-ongoing  Short term goal 2: Pt will complete LE dressing supine in bed w/ Min A-ongoing  Short term goal 3: Pt will complete UE dressing w/ Min A-goal met 5/25  Short term goal 4: Pt will complete bathing tasks w/ Min A-ongoing  Short term goal 5: Pt will demonstrate improved functional use of LUE as evident by pt ability to open grooming containers I'ly-ongoing  Long term goals  Time Frame for Long term goals : 4 weeks- all goals ongoing  Long term goal 1: Pt will complete toilet transfer w/ CGA  Long term goal 2: Pt will complete toileting tasks w/ CGA  Long term goal 3: Pt will complete LE dressing (either supine or bed or seated ) w/ CGA  Long term goal 4: Pt will complete UE dressing w/ setup and spvn  Long term goal 5: Pt will complete bathing tasks w/ SBA  Patient Goals   Patient goals : \"be able to walk\"       Therapy Time   Individual Concurrent Group Co-treatment   Time In       1011   Time Out       1045   Minutes       34   Timed Code Treatment Minutes: 34 Minutes       Second Session Therapy Time:   Individual Concurrent Group Co-treatment   Time In        3447   Time Out        1350   Minutes        65     Timed Code Treatment Minutes:  65    Total Treatment Minutes:  34+65= [address], OT

## 2021-05-27 NOTE — PROGRESS NOTES
Prior to her ICH, she was independent will all ADLs and functional mobility without any AD. She is currently supervision for bed mobility, Max A for stand pivot transfer, Osei for UE dress, MaxA for LB dressing, Osei for slide board transfers, and supervision for WC mobility. She is highly motivated and able to participate in 3 hrs of therapy per day in ARU setting. '  Social/Functional History  Lives With: Spouse;Daughter  Active : Yes  Occupation: Unemployed  [address]: Enjoys going to the [email or web address]  Additional Comments: Manages own medications, finances    Barriers toward progress: medical status (current UTI)    Date: 5/27/2021      Tx session 1 Weekly Conference Note   Total Timed Code Min 35    Total Treatment Minutes 35    Individual Treatment Minutes 35    Group Treatment Minutes 0    Co-Treat Minutes 0    Brief Exception: N/A    Pain None indicated    Pain Intervention: [] RN notified  [] Repositioned  [] Intervention offered and patient declined  [x] N/A  [] Other:    Subjective:     Pt upright in [facility] upon arrival; pleasant and agreeable to tx. SLP early for session and pt stated \"Oh good! I don't like just sitting! \". Noted to have L anterior pocketing of ground meat from lunch; no awareness. Required cues for lingual sweep and finger sweep to clear. x1 instance of L arm falling off WC platform and pt stated \"where did my arm go? \"      Objective / Goals:     Patient will participate in ongoing cognitive-linguistic assessment. GOAL MET   GOAL MET     Patient will complete tasks involving executive function skills with 90% accuracy/min cues. Functional banking activity w/ sorting constraints x3; pt noted to have mild impulsivity when sorting and required mod cues t/o pay attention to details. Constraints reduced to x2 to assist. Able to implement recommendations to assist w/ memory, including crossing off completed objects. Completed all mathematical calculations independently.     Picture sequencing for story-telling and recall:  x8 w/ 87% acc increasing to 100% acc given min cues  x6 w/ 66% acc increasing to 100% acc given mind cues  -Pt w/ excellent attention to detail to assist w/ sequencing t/o   GOAL IN PROGRESS- cont to target   Patient will complete graded recall tasks with 85% accuracy or min cues. Min- mod cues for recall of therapy schedule; improved recall of independent disciplines and activities completed during each session. GOAL IN PROGRESS- cont to target. Improved functional recall   Other areas targeted:     Education:   Educated pt re: memory impairments, executive function, reasoning behind activities, progress towards goals, and ongoing ST      Safety Devices: [x] Call light within reach  [x] Chair alarm activated and connected to nurse call light system  [] Bed alarm activated   [x] Other: Independent use of call light to alert RN of need to use restroom      Assessment:  Mild short term memory impairment. Plan: Continue per plan of care.          Interventions used this date:  [] Speech/Language Treatment  [] Instruction in HEP  [] Dysphagia Treatment [x] Cognitive Treatment   [] Other:  Discharge recommendations:  [] Home independently  [x] Home with assistance []  24 hour supervision  [] ECF [] Other  Continued Tx Upon Discharge: ? [] Yes    [] No    [x] TBD based on progress while on ARU     [] Vital Stim indicated     [] Other:   Estimated discharge date: 6/12/21    Thank you,    Kobi Lauren) [address], [address] [place] [address]; [phone]  Speech-Language Pathologist

## 2021-05-28 LAB — MAGNESIUM: 1.8 MG/DL (ref 1.8–2.4)

## 2021-05-28 PROCEDURE — 97530 THERAPEUTIC ACTIVITIES: CPT | Performed by: PHYSICAL THERAPIST

## 2021-05-28 PROCEDURE — 6370000000 HC RX 637 (ALT 250 FOR IP): Performed by: PHYSICAL MEDICINE & REHABILITATION

## 2021-05-28 PROCEDURE — 99232 SBSQ HOSP IP/OBS MODERATE 35: CPT | Performed by: PHYSICAL MEDICINE & REHABILITATION

## 2021-05-28 PROCEDURE — 97530 THERAPEUTIC ACTIVITIES: CPT

## 2021-05-28 PROCEDURE — 97129 THER IVNTJ 1ST 15 MIN: CPT

## 2021-05-28 PROCEDURE — 6360000002 HC RX W HCPCS: Performed by: PHYSICAL MEDICINE & REHABILITATION

## 2021-05-28 PROCEDURE — 1280000000 HC REHAB R&B

## 2021-05-28 PROCEDURE — 97130 THER IVNTJ EA ADDL 15 MIN: CPT

## 2021-05-28 PROCEDURE — 97535 SELF CARE MNGMENT TRAINING: CPT

## 2021-05-28 RX ADMIN — FUROSEMIDE 20 MG: 20 TABLET ORAL at 10:09

## 2021-05-28 RX ADMIN — MAGNESIUM OXIDE TAB 400 MG (240 MG ELEMENTAL MG) 400 MG: 400 (240 MG) TAB at 14:03

## 2021-05-28 RX ADMIN — Medication 5 MG: at 21:18

## 2021-05-28 RX ADMIN — PANTOPRAZOLE SODIUM 40 MG: 40 TABLET, DELAYED RELEASE ORAL at 05:47

## 2021-05-28 RX ADMIN — CITALOPRAM HYDROBROMIDE 20 MG: 20 TABLET ORAL at 10:08

## 2021-05-28 RX ADMIN — POLYETHYLENE GLYCOL 3350 17 G: 17 POWDER, FOR SOLUTION ORAL at 10:08

## 2021-05-28 RX ADMIN — LISINOPRIL 40 MG: 40 TABLET ORAL at 10:08

## 2021-05-28 RX ADMIN — PREDNISONE 7.5 MG: 5 TABLET ORAL at 10:09

## 2021-05-28 RX ADMIN — ATORVASTATIN CALCIUM 40 MG: 40 TABLET, FILM COATED ORAL at 21:18

## 2021-05-28 RX ADMIN — ENOXAPARIN SODIUM 40 MG: 40 INJECTION SUBCUTANEOUS at 10:08

## 2021-05-28 RX ADMIN — CARVEDILOL 25 MG: 12.5 TABLET, FILM COATED ORAL at 16:45

## 2021-05-28 RX ADMIN — Medication 2000 UNITS: at 10:09

## 2021-05-28 RX ADMIN — MAGNESIUM OXIDE TAB 400 MG (240 MG ELEMENTAL MG) 400 MG: 400 (240 MG) TAB at 10:09

## 2021-05-28 RX ADMIN — MAGNESIUM OXIDE TAB 400 MG (240 MG ELEMENTAL MG) 400 MG: 400 (240 MG) TAB at 21:18

## 2021-05-28 RX ADMIN — AMOXICILLIN AND CLAVULANATE POTASSIUM 1 TABLET: 875; 125 TABLET, FILM COATED ORAL at 21:18

## 2021-05-28 RX ADMIN — AMLODIPINE BESYLATE 5 MG: 5 TABLET ORAL at 10:09

## 2021-05-28 RX ADMIN — ASPIRIN 81 MG: 81 TABLET, CHEWABLE ORAL at 10:09

## 2021-05-28 RX ADMIN — CARVEDILOL 25 MG: 12.5 TABLET, FILM COATED ORAL at 10:17

## 2021-05-28 RX ADMIN — LEVOTHYROXINE SODIUM 50 MCG: 0.05 TABLET ORAL at 05:46

## 2021-05-28 RX ADMIN — MAGNESIUM OXIDE TAB 400 MG (240 MG ELEMENTAL MG) 400 MG: 400 (240 MG) TAB at 16:46

## 2021-05-28 RX ADMIN — AMOXICILLIN AND CLAVULANATE POTASSIUM 1 TABLET: 875; 125 TABLET, FILM COATED ORAL at 10:09

## 2021-05-28 RX ADMIN — POTASSIUM CHLORIDE 20 MEQ: 20 TABLET, EXTENDED RELEASE ORAL at 10:17

## 2021-05-28 ASSESSMENT — PAIN SCALES - GENERAL
PAINLEVEL_OUTOF10: 0

## 2021-05-28 NOTE — PROGRESS NOTES
Patient is in bed and resting at this time, vitals stable, no pain at this time. Patient has been incontinent 2 x with urine. Takes meds whole with pudding. Call light with in reach and bed alarm on.

## 2021-05-28 NOTE — PROGRESS NOTES
ACUTE REHAB UNIT  SPEECH/LANGUAGE PATHOLOGY      [x] Daily  [] Weekly Care Conference Note  [] Discharge    Patient: Sagrario Mendoza      :1945  ISAAK:[id number]  Rehab Dx/Hx: 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, unspecified ([facility] [address] [I61.2]    Precautions: [] Aspiration  [x] Fall risk  [] Sternal  [] Seizure [] Hip  [] Weight Bearing [] Other  ST Dx: [] Aphasia  [] Dysarthria  [] Apraxia   [] Oropharyngeal dysphagia [x] Cognitive Impairment  [] Other:   Date of Admit: 2021  Room #: 3104/3104-01  Date: 2021          Current Diet Order:DIET GENERAL;  Dietary Nutrition Supplements: Standard High Calorie Oral Supplement   Vision  Vision: Impaired  Vision Exceptions: Wears glasses at all times  Hearing  Hearing: Within functional limits     Comments: Per admitting H&P (2021): 'Patient is a 66 yo female who originally admitted to hospital /-21 for Right intracerebral hemorrhage with L hemiparesis. She has a know past medical hx of systolic CHF (EF 70%), A Fib (on warfarin), ICD, HTN, Hypothyroidism, rectal cancer s/p resection, and ITP. Her hemorrhage became worse with a midline shift and decline in her neurologic status. Patient was started on seizure prophalxis, and neurosurgery followed, but no indication for surgery. She was also treated for UTI, anti-hypertensives adjusted to maintain bp in normal range  with ICH. She failed MBS and GI consulted who recommended PEG. She was also treated with zosyn for possible aspiration pneumonia. S/P PEG placed on 21. EGD recommended PPI therapy. She remained stable and was discharged to [facility]. Patient presented to SNF on 21. She improved with her swallowing and tolerated a regular diet,  and GI removed PEG on 21. Patient has made significant progress in her alertness and activity tolerance, and it is felt that she would benefit from and could tolerate intensive inpatient rehab unit treatment at this time. able to recall 1/3 words with 10 minute delay, up to 3/3 with mod cues. Other areas targeted:    Education:   Educated pt re: memory impairment and strategies and progress towards goals. Safety Devices: [x] Call light within reach  [x] Chair alarm activated and connected to nurse call light system  [] Bed alarm activated   [x] Other: Independent use of call light to alert RN of need to use restroom     Assessment:  Mild short term memory impairment. Plan: Continue per plan of care.         Interventions used this date:  [] Speech/Language Treatment  [] Instruction in HEP  [] Dysphagia Treatment [x] Cognitive Treatment   [] Other:  Discharge recommendations:  [] Home independently  [x] Home with assistance []  24 hour supervision  [] ECF [] Other  Continued Tx Upon Discharge: ? [] Yes    [] No    [x] TBD based on progress while on ARU     [] Vital Stim indicated     [] Other:   Estimated discharge date: 6/12/21    Thank you,    Margi Conner MA CCC/SLP [id number]  Speech-Language Pathologist

## 2021-05-28 NOTE — PROGRESS NOTES
Physical Therapy  Facility/Department: [facility] ACUTE REHAB UNIT  Daily Treatment Note  NAME: Sophie Heller  : 1945  MRN: [id number]    Date of Service: 2021    Discharge Recommendations:  24 hour supervision or assist, Home with Home health PT   PT Equipment Recommendations  Other: continue to assess, pt states that she owns a RW    Assessment   Body structures, Functions, Activity limitations: Decreased functional mobility ; Decreased safe awareness;Decreased balance;Decreased cognition;Decreased vision/visual deficit; Decreased endurance;Decreased strength;Decreased sensation;Decreased fine motor control  Assessment: . At this time,therapy continues using the [address] [place] [address] stedy for transf is best possible option until pt's status improves. Therapy discussed with  not to have therapy immediately following her meals / to c/o increased discomfort. Pt is well below baseline and would benefit from cont therapy to maximize potential and increase functional obility towards Ind to allow for a safer d/c to home. Treatment Diagnosis: decreased functional mobility due to nontraumatic ICH  Decision Making: Medium Complexity  PT Education: General Safety; Energy Conservation;Transfer Training;Orientation; Adaptive Device Training;Functional Mobility Training  Activity Tolerance  Activity Tolerance: Patient limited by fatigue;Patient limited by endurance; Patient limited by pain  Activity Tolerance: Pt requests rests with increased demands when addressing toileting needs. Patient Diagnosis(es): There were no encounter diagnoses. has a past medical history of Arthritis, CAD (coronary artery disease), Cancer ([address] Cerebral artery occlusion with cerebral infarction [facility]), CHF (congestive heart failure) ([address] Hypertension, and Thyroid disease. has a past surgical history that includes Abdomen surgery; Hysterectomy; Colonoscopy; hernia repair; and pacemaker placement.     Restrictions  Position Activity Restriction  Other position/activity restrictions: fall precautions  Subjective   General  Chart Reviewed: Yes  Additional Pertinent Hx: Pt is a 67 y.o. female admitted to ARU on 5/21/21 from SNF. Per MD note, pt originally \"admitted to [address] from 1/23/21-2/9/21 for intracerebral hemorrhage with L hemiparesis. She originally presented to the ER with left sided weakness and bp was 191/161 so she was started on Cleviprex gtt. She has a known hx of systolic CHF (EF 76%), A Fib (on warfarin), ICD, HTN, Hypothyroidism, rectal cancer s/p resection, and ITP. \"  Pt d/c to SNF on 2/9/21 and made significant progress. She now admits to ARU. Family / Caregiver Present: No  Referring Practitioner: Sana Viveros MD  Subjective  Subjective: Pt immediately reported that \"she needed to go to the bathroom\"  General Comment  Comments: Pt supine in bed when PT arrived. Pt agreeable to therapy  Pain Screening  Patient Currently in Pain: Denies  Vital Signs  Patient Currently in Pain: Denies       Orientation  Orientation  Overall Orientation Status: Within Functional Limits (Pt presents with STM with inability to recall activities that occurred in past 24 hours.)  Cognition      Objective    PT and OT worked collaboratively to utilize the skills of 2 disciplines while maximizing functional mobility to obtain optimal potential.   Bed mobility  Supine to Sit: Contact guard assistance (HOB elevated + use of bed rail. VCs needed for technique)  Transfers  Sit to Stand: Dependent/Total (Utilized a gary stedy which pt actively participated transitioning from sitting to standing from elevated bed and  commode)  Stand to sit: Dependent/Total;2 Person Assistance (Utilized the gary stedy so was dependently positioned back into sitting into w/c)  Bed to Chair: Dependent/Total Sophia alcantara)  Comment: Note, transf req additional time with each step sequenced to produce a successful transition.  Pt is able to move all 4 extrem but has decreased proprioception on the L side especially with LUE. Therefore, LUE is passively positioned and maintained there by therapist  Ambulation  Ambulation?: No  WB Status: Pt is not safe for ambulation at this time)  Stairs/Curb  Stairs?: No  Wheelchair Activities  Wheelchair Size: Initiated therapy in a 22\" standard  w/c but transitioned to a 20\" cong height w/c  Wheelchair Cushion: Pressure Relieving  Level of Assistance for pressure relief activities: Supervision  Wheelchair Parts Management: Yes  Left Brakes Level of Assistance: Contact guard assistance (VC to locate and fully engage the brake with physical A to manage the brake)  Right Brakes Level of Assistance: Supervision (VC to locate and fully engage the brake,)  Propulsion: Yes  Propulsion 1  Propulsion: Manual  Level: Level Tile  Method: RLE;LLE  Level of Assistance: Supervision  Description/ Details: Used BLES in a stepping pattern but req VC for positioning of LLE especially as pt fatigued and increased delay on moving LLE  Distance: 150' x1      Comment: Pt wearing gown when therapy arrived. Therapy assisted pt with donning B socks and shoes prior to getting to EOB in prep for transf with the gary stedy. with the gavino stedy, pt was tramsported to commode in which pt was then able to urinate in commode and was set up for pericare. Pt ten completed dressing tasks while sitting on commode. This included donning , briefs, pants and shirt. Refer to OT note for pt's status with dressing. Pt demo Fair plus sitting balance demo the ability to lean forward and laterally to manage pants/ briefs. Pt utilized the GB with the luE req repositioning multiple times 2/2 to the poor proprioception. Second session: Pt sitting in w/c when PT arrived. Pt agreeable to therapy.    PT and OT worked collaboratively to utilize the skills of 2 disciplines while maximizing functional mobility to obtain optimal potential.     Bed mobility  Supine to Sit: Contact flexed excessively and pt's L foot came off of the foot plate of the gary stedy. Pt was lowered to chair, repositioned and a piece of dicem was then used under the L foot. Note, pt consistently reported to both therapists that she was so \"tired\" Despite pt's efforts in therapy, pt was unable to alicia more. Pt transported back to room in the stedy and positioned back to bed. Call light and phone placed within reach. Bed alarm reactivated. G-Code     OutComes Score                                                     AM-PAC Score             Goals  Short term goals  Time Frame for Short term goals: 2 weeks  Short term goal 1: Pt will perform all bed mobility with Osei. Ongoing  Short term goal 2: Pt will perform squat pivot with MaxA. Ongoing  Short term goal 3: Pt will perform sit to/from stand with LRAD and MaxA. Ongoing  Short term goal 4: Pt will propel w/c 150' with supervision. Ongoing  Long term goals  Time Frame for Long term goals : 3-4 weeks  Long term goal 1: Pt will perform all bed mobility with supervision. Ongoing  Long term goal 2: Pt will perform squat pivot with CGA. Ongoing  Long term goal 3: Pt will perform sit to/from stand with LRAD and Osei. Ongoing  Long term goal 4: Pt will propel w/c 150' with Baltazar. Ongoing  Patient Goals   Patient goals : \"I want to walk again. \"    Plan    Plan  Times per week: 5x/week, 75 minutes a day  Current Treatment Recommendations: Strengthening, Neuromuscular Re-education, Home Exercise Program, ROM, Safety Education & Training, Balance Training, Endurance Training, Patient/Caregiver Education & Training, Functional Mobility Training, Wheelchair Mobility Training, Equipment Evaluation, Education, & procurement, Transfer Training, Gait Training  Safety Devices  Type of devices:  All fall risk precautions in place, Call light within reach, Chair alarm in place, Gait belt, Left in chair, Nurse notified (THerapy built up the L side of w/c cushion to facilitate symmetrical sitting)     Therapy Time   Individual Concurrent Group Co-treatment   Time In       0830   Time Out       0910   Minutes       40        Second Session Therapy Time:   Individual Concurrent Group Co-treatment   Time In       1245   Time Out       1335   Minutes       50     Timed Code Treatment Minutes:  50+40    Total Treatment Minutes: Jp Jessica PT

## 2021-05-28 NOTE — PROGRESS NOTES
Alert and oriented. Denying pain or discomfort. Continues to have urinary urgency and increased frequency. Continent/incontinent of urine. Positive for UTI and taking PO augmentin. Also takes 20 mg of lasix daily. Denying dysuria. Heavy transfer of 2-3 persons with STEDY and gait belt. VSS on RA. Appetite remains good, % of meals.

## 2021-05-28 NOTE — PROGRESS NOTES
Occupational Therapy  Facility/Department: [facility] ACUTE REHAB UNIT  Daily Treatment Note  NAME: Alberto Rondon  : 1945  MRN: [id number]    Date of Service: 2021    Discharge Recommendations:  Home with Home health OT, Home with nursing aide, 24 hour supervision or assist, Continue to assess pending progress  OT Equipment Recommendations  ADL Assistive Devices: Shower Chair with back  Other: Continue to assess for needs    Assessment   Performance deficits / Impairments: Decreased functional mobility ; Decreased cognition;Decreased ADL status; Decreased endurance;Decreased fine motor control;Decreased ROM; Decreased sensation;Decreased coordination;Decreased strength;Decreased balance;Decreased posture;Decreased safe awareness  Assessment: Pt demonstrated great progress w/ LE dressing today as evident by pt ability to thread BLEs into underwear and pants seated on toilet w/ setup and VCs. Pt however is still dependent for LE dressing since assist x2 is needed to pull clothing over hips in stance. Pt is also still requiring assist x2 for functional transfers at this time. Unfortunately, a better transfer strategy has not been successful and the gary stedy is the safest option. Pt continues to benefit from OT. Cont OT per POC  Treatment Diagnosis: Decreased ADL status and decreased ROM 2/2 CVA    OT Education: ADL Adaptive Strategies;Transfer Training  Patient Education: pt verb understanding but benefits from reinforcement due to poor recall  REQUIRES OT FOLLOW UP: Yes  Activity Tolerance  Activity Tolerance: Patient Tolerated treatment well  Safety Devices  Safety Devices in place: Yes  Type of devices: Call light within reach; Chair alarm in place; Left in chair;Nurse notified         Patient Diagnosis(es): There were no encounter diagnoses.       has a past medical history of Arthritis, CAD (coronary artery disease), Cancer ([facility] [address] Cerebral artery occlusion with cerebral infarction [facility]), CHF (congestive heart failure) ([facility] [address] Hypertension, and Thyroid disease. has a past surgical history that includes Abdomen surgery; Hysterectomy; Colonoscopy; hernia repair; and pacemaker placement. Restrictions  Position Activity Restriction  Other position/activity restrictions: fall precautions  Subjective   General  Chart Reviewed: Yes  Patient assessed for rehabilitation services?: Yes  Additional Pertinent Hx: Per MD H and P:Patient is a 66 yo female who originally admitted to hospital /23/21-2/9/21 for Right intracerebral hemorrhage with L hemiparesis. She has a know past medical hx of systolic CHF (EF 75%), A Fib (on warfarin), ICD, HTN, Hypothyroidism, rectal cancer s/p resection, and ITP. Her hemorrhage became worse with a midline shift and decline in her neurologic status. Patient was started on seizure prophalxis, and neurosurgery followed, but no indication for surgery. She was also treated for UTI, anti-hypertensives adjusted to maintain bp in normal range  with ICH. She failed MBS and GI consulted who recommended PEG. She was also treated with zosyn for possible aspiration pneumonia. S/P PEG placed on 2/6/21. EGD recommended PPI therapy. She remained stable and was discharged to [facility]. Patient presented to SNF on 2/9/21. She improved with her swallowing and tolerated a regular diet,  and GI removed PEG on 5/18/21. Pt had been at the [facility] since February and has now exhausted her days left at the SNF. Pt admitted to ARU 5/21  Family / Caregiver Present: No  Referring Practitioner: Dr. Marti Veras  Diagnosis: CVA  Subjective  Subjective: Pt was semi supine in bed finishing breakfast upon arrival. Pt was pleasant and agreeable to OT/PT. Co treat indicated to maximize functional independence.     Vital Signs  Patient Currently in Pain: Denies   Orientation     Objective    ADL  UE Dressing: Setup (Pt completed 4/4 components of UE dressing w/ setup and min VCs for technique)  LE Dressing: Setup;Verbal cueing; Increased time to complete;Dependent/Total (See below for more comments)  Toileting: Dependent/Total (See below for more comments)  Additional Comments: Pt reported she needed to use the bathroom upon OT/PT arrival. Pt was assisted from EOB to [facility]. Pt was assisted on gary stedy onto toilet. Pt required assist x2 in [address] [place] [address] for pants management down. While seated on toilet, pt stated \"can I pee? \" and \"am I ont the toilet? \". Pt was continent of urine and was able to complete front marilyn care seated. Pt also threaded BLEs into underwear and pants today w/ ++ time and VCs. Pt however was still dependent for LE dressing since assist x2 was needed in stance in stedy to pull clothes over hips. Pt required ++ time for toileting and LE dressing tasks. Balance  Sitting Balance: Stand by assistance (seated EOB)  Standing Balance: Dependent/Total (Assist x2 in [facility])  Standing Balance  Time: ~1 min total  Activity: Stance in [facility] for pants management  Comment: Sasha alcantara was padded to prevent B knee pain  Functional Mobility  Functional - Mobility Device:  ([facility])  Activity: To/from bathroom  Assist Level: Dependent/Total  Toilet Transfers  Toilet - Technique:  ([facility])  Equipment Used: Standard toilet  Toilet Transfer: 2 Person assistance;Dependent/Total (Assist x2 to/from standard toilet)  Toilet Transfers Comments: Max Ax2 to/from standard toilet    Bed mobility  Supine to Sit: Contact guard assistance (HOB elevated + use of bed rail. VCs needed for technique)    Transfers  Sit to stand: Dependent/Total;2 Person assistance (Max A x2 from EOB to [facility])  Stand to sit: Dependent/Total (assist x2 in [facility])                         Cognition  Arousal/Alertness: Appropriate responses to stimuli  Following Commands: Follows multistep commands with increased time; Follows multistep commands with repitition  Attention Span: Attends with cues to redirect  Memory: Decreased recall of recent events;Decreased short term memory  Safety Judgement: Decreased awareness of need for assistance  Problem Solving: Assistance required to generate solutions  Insights: Fully aware of deficits  Initiation: Requires cues for some  Sequencing: Requires cues for some  Cognition Comment: Pt w/ some confusion at times as evident by pt unaware that she was sitting on the toilet in the bathroom. Pt is forgetful and requires repetition of info and commands. Pt is highly motivated       Perception  Unilateral Attention: Cues to attend left visual field;Cues to attend to left side of body  Initiation: Cues to initiate tasks                               2nd session: Pt was seated in w/c finishing lunch upon arrival. Pt reported being very tired but was agreeable to OT/PT. Co treat indicated to maximize functional independence. Functional mobility:  Pt self propelled w/c from room to therapy gym 111ft w/ spvn. VCs needed for management of brakes. Functional transfers:  Sit <> stand- completed from w/c > gary stedy 3x w/ max A x2. Pt w/ increased difficulty engaging through BLEs however pt was attempting to assist w/ BUEs. Pt assisted from [address] [place] [address] stedy into bed w/ Max Ax2 at end of session. Functional reaching:  Pt maintained stance in gary stedy to promote WB through BLEs and complete functional reaching outside JHOAN to elicit LE extension. Pt initially maintained stance in gary stedy for ~2 mins. OT and PT placed a wedge under pt's buttocks for improved pelvic dissociation. Upon placement of wedge however this somehow caused pt's LLE to go into extension resulting in pt's LLE completely off of gary stedy platform. OT and PT assisted w re-positioning of LLE and pt was then assisted into sitting position in w/c due to pt c/o B knee pain. Pt was somewhat SOB after stance. Vitals assessed- HR 64, SpO2 96%.  Pt required an extended rest break and stated she was just very tired today and wasn't sure if she could do it again. Pt was agreeable to re-attempt stance after long rest break. During second standing trial pt reached outside of JHOAN to retrieve letters w/ RUE. Pt placed letters anteriorly and superiorly in order to increase BLE extension. Pt had increased difficulty w/ reaching superiorly but was eventually able to reach ~8 letters in stance for ~7 mins total. Pt had mod difficulty spelling words w/ letters and required VCs to arrange letters in correct sequence. Pt was very fatigued after this exercise and was transferred in the [address] [place] [address] back to her room. Bed mobility:  Sit > supine- Min A to lift LLE into bed. Pt was left in bed w/ bed alarm on and call light within reach.                    Plan   Plan  Times per week: 5x a week for 75 mins daily  Times per day: Daily  Current Treatment Recommendations: Strengthening, Wheelchair Mobility Training, Positioning, ROM, Safety Education & Training, Balance Training, Patient/Caregiver Education & Training, Self-Care / ADL, Cognitive/Perceptual Training, Functional Mobility Training, Neuromuscular Re-education, Equipment Evaluation, Education, & procurement, Home Management Training, Endurance Training  G-Code     OutComes Score                                                  AM-PAC Score             Goals  Short term goals  Time Frame for Short term goals: 2 weeks  Short term goal 1: Pt will complete toilet transfer w/ Mod A-ongoing  Short term goal 2: Pt will complete LE dressing supine in bed w/ Min A- ongoing  Short term goal 3: Pt will complete UE dressing w/ Min A-goal met 5/25  Short term goal 4: Pt will complete bathing tasks w/ Min A-ongoing  Short term goal 5: Pt will demonstrate improved functional use of LUE as evident by pt ability to open grooming containers I'ly-ongoing  Long term goals  Time Frame for Long term goals : 4 weeks- all goals ongoing  Long term goal 1: Pt will complete toilet transfer w/ CGA  Long term goal 2: Pt will complete toileting tasks w/ CGA  Long term goal 3: Pt will complete LE dressing (either supine or bed or seated ) w/ CGA  Long term goal 4: Pt will complete UE dressing w/ setup and spvn  Long term goal 5: Pt will complete bathing tasks w/ SBA  Patient Goals   Patient goals : \"be able to walk\"       Therapy Time   Individual Concurrent Group Co-treatment   Time In       0830   Time Out       0910   Minutes       40   Timed Code Treatment Minutes: 40 Minutes       Second Session Therapy Time:   Individual Concurrent Group Co-treatment   Time In        6281   Time Out        9636   Minutes        50     Timed Code Treatment Minutes:  50    Total Treatment Minutes:  40+50= [facility], OT

## 2021-05-28 NOTE — PLAN OF CARE
Problem: Falls - Risk of:  Goal: Absence of physical injury  Description: Absence of physical injury  5/28/2021 1700 by Zion Ames RN  Outcome: Ongoing  Note: Pt remains free from accidental injury during this stay on the ARU. Will continue to monitor pt and assess per schedule and prn.   5/28/2021 0409 by Sommer Hdez RN  Outcome: Ongoing     Problem: Skin Integrity:  Goal: Absence of new skin breakdown  Description: Absence of new skin breakdown  Outcome: Ongoing  Note: Patient offered to be toileted every two hours and PRN, skin barrier applied as needed. Staff assists patient with repositioning and pillow support is provided when needed. Patient educated on offloading techniques and verbalizes understanding.

## 2021-05-28 NOTE — PROGRESS NOTES
Department of Physical Medicine & Rehabilitation  Progress Note    Patient Identification:  Ana Martínez  [id number]  : 1945  Admit date: 2021    Chief Complaint: L hemiparesis due to R intracerebral hemorrhage    Subjective:   Doing well in therapy. No new complaints overnight. Improving with therapy. ROS: No f/c, n/v, cp. Objective:  Patient Vitals for the past 24 hrs:   BP Temp Temp src Pulse Resp SpO2   21 1000 115/72 97.8 °F (36.6 °C) Oral 68 18 97 %   21 2137 136/84 97.8 °F (36.6 °C) Oral 76 18 95 %     Const: Alert. No distress, pleasant. HEENT: Normocephalic, atraumatic. Normal sclera/conjunctiva. MMM. CV: Regular rate and rhythm. Resp: No respiratory distress. Lungs CTAB. Abd: Soft, nontender, nondistended, NABS+   Ext: No edema. Neuro: Alert, oriented, appropriately interactive. Left neglect noted. Motor examination reveals normal strength in right side, 3-/5 strength left side diffusely. Psych: Cooperative, appropriate mood and affect    Laboratory data: Available via EMR.    Last 24 hour lab  Recent Results (from the past 24 hour(s))   CBC auto differential    Collection Time: 21 11:20 AM   Result Value Ref Range    WBC 7.2 4.0 - 11.0 K/uL    RBC 4.14 4.00 - 5.20 M/uL    Hemoglobin 12.9 12.0 - 16.0 g/dL    Hematocrit 37.9 36.0 - 48.0 %    MCV 91.7 80.0 - 100.0 fL    MCH 31.3 26.0 - 34.0 pg    MCHC 34.1 31.0 - 36.0 g/dL    RDW 14.9 12.4 - 15.4 %    Platelets 574 081 - 562 K/uL    MPV 7.8 5.0 - 10.5 fL    Neutrophils % 82.9 %    Lymphocytes % 6.9 %    Monocytes % 6.2 %    Eosinophils % 3.6 %    Basophils % 0.4 %    Neutrophils Absolute 6.0 1.7 - 7.7 K/uL    Lymphocytes Absolute 0.5 (L) 1.0 - 5.1 K/uL    Monocytes Absolute 0.5 0.0 - 1.3 K/uL    Eosinophils Absolute 0.3 0.0 - 0.6 K/uL    Basophils Absolute 0.0 0.0 - 0.2 K/uL   Basic metabolic panel    Collection Time: 21 11:20 AM   Result Value Ref Range    Sodium 136 136 - 145 mmol/L    Potassium 4.0 3.5 - 5.1 mmol/L    Chloride 101 99 - 110 mmol/L    CO2 25 21 - 32 mmol/L    Anion Gap 10 3 - 16    Glucose 130 (H) 70 - 99 mg/dL    BUN 11 7 - 20 mg/dL    CREATININE <0.5 (L) 0.6 - 1.2 mg/dL    GFR Non-African American >60 >60    GFR African American >60 >60    Calcium 9.4 8.3 - 10.6 mg/dL       Therapy progress:  PT  Position Activity Restriction  Other position/activity restrictions: fall precautions  Objective     Sit to Stand: Dependent/Total (Utilized a standing table in which pt actively participated transitioning from sitting to standing)  Stand to sit: Dependent/Total (Utilized the standing frame so was dependently positioned back into sitting)  Bed to Chair: Dependent/Total (Performed a slide board ( SB ) with going from  higher surface ( bed)>bedside commode ( lower surface)  req min  A x2 leading with the R side. Mod A x2 from commode> w/c)     OT  PT Equipment Recommendations  Other: continue to assess, pt states that she owns a RW  Toilet - Technique: To right, To left (slide board)  Equipment Used: Standard bedside commode  Toilet Transfers Comments: Min Ax2 from EOB > BSC; Mod Ax2 from [facility] to w/c  Assessment        SLP          Body mass index is 31.61 kg/m². Assessment and Plan:  Right intracerebral hemorrhage with L hemiparesis  - Cont lipitor 40 mg daily, ASA 81 mg daily     UTI  UA: cloudy, large amoutn of LE, WBC 10-20, bacteria rare. - Urine culture in process  - Cont augmentin 875-125 mg BID     Systolic CHF (EF 20%)  - Cont coreg 25 mg BID,Lasix 20 mg daily     A Fib with ICD pacemaker  - Cont ASA (had been on Coumadin)     HTN  - Cont norvasc 5 mg daily, lisinopril 40 mg daily     HLD  - Cont lipitor 40 mg daily     Hypothyroidism  - Cont synthroid 50 mcg daily     Hx of rectal cancer s/p resection  - Colostomy present, monitor output    PPx: protonix 40 mg daily, lovenox 40 mg daily     ITP- Monitor     Bowels: Schedule Miralax + Senna S. Follow bowel movements.  Enema or suppository if needed.      Bladder: Check PVR x 3.   [address] if PVR > 200ml or if any volume is > 500 ml.      Pain: Tylenol is ordered prn.        Rehab Progress: Improving  Anticipated Dispo: SNF  Services/DME: TBD  ELOS: 6/17      KAT PinaPNatH  PM&R  5/28/2021  10:27 AM

## 2021-05-28 NOTE — PLAN OF CARE
Problem: Falls - Risk of:  Goal: Will remain free from falls  Description: Will remain free from falls  5/28/2021 0409 by Rogelio Pedersen RN  Outcome: Ongoing  Note: Patient is a fall risk. Patient is a x 3 rossi. See Fall Risk assessment for details. Bed is in low, lock position; call light/belongings within reach. No attempts to get out of bed have been made, calls appropriately when assistance is needed. Bed alarm and hourly rounds in place for safety. Problem: Falls - Risk of:  Goal: Absence of physical injury  Description: Absence of physical injury  5/28/2021 0409 by Rogelio Pedersen RN  Outcome: Ongoing     Problem: Skin Integrity:  Goal: Will show no infection signs and symptoms  Description: Will show no infection signs and symptoms  5/28/2021 0409 by Rogelio Pedersen RN  Outcome: Ongoing  Note: Pt at risk for skin breakdown. Patient has redness on bottom. Skin assessment as documented. Pts skin cleansed with inc cleanser as needed. Pt repositioned in bed with pillow support as needed. Call light within reach. Problem: Nutrition  Goal: Optimal nutrition therapy  5/28/2021 0409 by Rogelio Pedersen RN  Outcome: Ongoing     Problem: Pain:  Goal: Pain level will decrease  Description: Pain level will decrease  5/28/2021 0409 by Rogelio Pedersen RN  Outcome: Ongoing  Note: Pt resting at this time. No complaints of pain. Encouraged patient to call out with any needs.       Problem: Pain:  Goal: Control of acute pain  Description: Control of acute pain  5/28/2021 0409 by Rogelio Pedersen RN  Outcome: Ongoing     Problem: Pain:  Goal: Control of chronic pain  Description: Control of chronic pain  5/28/2021 0409 by Rogelio Pedersen RN  Outcome: Ongoing

## 2021-05-29 PROCEDURE — 6370000000 HC RX 637 (ALT 250 FOR IP): Performed by: PHYSICAL MEDICINE & REHABILITATION

## 2021-05-29 PROCEDURE — 1280000000 HC REHAB R&B

## 2021-05-29 PROCEDURE — 6360000002 HC RX W HCPCS: Performed by: PHYSICAL MEDICINE & REHABILITATION

## 2021-05-29 PROCEDURE — 99232 SBSQ HOSP IP/OBS MODERATE 35: CPT | Performed by: PHYSICAL MEDICINE & REHABILITATION

## 2021-05-29 RX ADMIN — Medication 5 MG: at 20:48

## 2021-05-29 RX ADMIN — CARVEDILOL 25 MG: 12.5 TABLET, FILM COATED ORAL at 16:09

## 2021-05-29 RX ADMIN — MAGNESIUM OXIDE TAB 400 MG (240 MG ELEMENTAL MG) 400 MG: 400 (240 MG) TAB at 11:14

## 2021-05-29 RX ADMIN — POLYETHYLENE GLYCOL 3350 17 G: 17 POWDER, FOR SOLUTION ORAL at 11:15

## 2021-05-29 RX ADMIN — PANTOPRAZOLE SODIUM 40 MG: 40 TABLET, DELAYED RELEASE ORAL at 06:07

## 2021-05-29 RX ADMIN — ATORVASTATIN CALCIUM 40 MG: 40 TABLET, FILM COATED ORAL at 20:49

## 2021-05-29 RX ADMIN — CITALOPRAM HYDROBROMIDE 20 MG: 20 TABLET ORAL at 11:12

## 2021-05-29 RX ADMIN — MAGNESIUM OXIDE TAB 400 MG (240 MG ELEMENTAL MG) 400 MG: 400 (240 MG) TAB at 18:17

## 2021-05-29 RX ADMIN — LEVOTHYROXINE SODIUM 50 MCG: 0.05 TABLET ORAL at 06:07

## 2021-05-29 RX ADMIN — MAGNESIUM OXIDE TAB 400 MG (240 MG ELEMENTAL MG) 400 MG: 400 (240 MG) TAB at 14:00

## 2021-05-29 RX ADMIN — AMLODIPINE BESYLATE 5 MG: 5 TABLET ORAL at 11:13

## 2021-05-29 RX ADMIN — FUROSEMIDE 20 MG: 20 TABLET ORAL at 11:13

## 2021-05-29 RX ADMIN — ASPIRIN 81 MG: 81 TABLET, CHEWABLE ORAL at 11:11

## 2021-05-29 RX ADMIN — MAGNESIUM OXIDE TAB 400 MG (240 MG ELEMENTAL MG) 400 MG: 400 (240 MG) TAB at 20:48

## 2021-05-29 RX ADMIN — Medication 2000 UNITS: at 11:12

## 2021-05-29 RX ADMIN — AMOXICILLIN AND CLAVULANATE POTASSIUM 1 TABLET: 875; 125 TABLET, FILM COATED ORAL at 20:48

## 2021-05-29 RX ADMIN — CARVEDILOL 25 MG: 12.5 TABLET, FILM COATED ORAL at 11:12

## 2021-05-29 RX ADMIN — POTASSIUM CHLORIDE 20 MEQ: 20 TABLET, EXTENDED RELEASE ORAL at 11:13

## 2021-05-29 RX ADMIN — ENOXAPARIN SODIUM 40 MG: 40 INJECTION SUBCUTANEOUS at 11:13

## 2021-05-29 RX ADMIN — PREDNISONE 7.5 MG: 5 TABLET ORAL at 11:13

## 2021-05-29 RX ADMIN — LISINOPRIL 40 MG: 40 TABLET ORAL at 11:11

## 2021-05-29 RX ADMIN — AMOXICILLIN AND CLAVULANATE POTASSIUM 1 TABLET: 875; 125 TABLET, FILM COATED ORAL at 11:13

## 2021-05-29 ASSESSMENT — PAIN SCALES - GENERAL: PAINLEVEL_OUTOF10: 0

## 2021-05-29 NOTE — PROGRESS NOTES
Shift assessment complete. VSS, A/Ox4, fall precautions in place. Call light in reach. Pt denies pain or discomfort. Will continue to monitor until end of shift.       Vitals:    05/28/21 2045   BP: 125/70   Pulse: 71   Resp: 18   Temp: 97.5 °F (36.4 °C)   SpO2: 95%

## 2021-05-29 NOTE — PROGRESS NOTES
Department of Physical Medicine & Rehabilitation  Progress Note    Patient Identification:  Elizabeth Vanessa  [id number]  : 1945  Admit date: 2021    Chief Complaint: L hemiparesis due to R intracerebral hemorrhage    Subjective:   No new complaints overnight. She is doing well today without issues. She slept well and is still trying very hard to improve her right sided weakness. ROS: No f/c, n/v, cp. Objective:  Patient Vitals for the past 24 hrs:   BP Temp Temp src Pulse Resp SpO2   21 2045 125/70 97.5 °F (36.4 °C) Oral 71 18 95 %   21 1645 128/76   87 18      Const: Alert. No distress, pleasant. HEENT: Normocephalic, atraumatic. Normal sclera/conjunctiva. MMM. CV: Regular rate and rhythm. Resp: No respiratory distress. Lungs CTAB. Abd: Soft, nontender, nondistended, NABS+   Ext: No edema. Neuro: Alert, oriented, appropriately interactive. Left neglect noted. Motor examination reveals normal strength in right side, 3-/5 strength left side diffusely. Psych: Cooperative, appropriate mood and affect    Laboratory data: Available via EMR. Last 24 hour lab  No results found for this or any previous visit (from the past 24 hour(s)). Therapy progress:  PT  Position Activity Restriction  Other position/activity restrictions: fall precautions  Objective     Sit to Stand: Dependent/Total (Utilized a gary stedy which pt actively participated transitioning from sitting to standing from elevated bed and  commode)  Stand to sit: Dependent/Total, 2 Person Assistance (Utilized the gary stedy so was dependently positioned back into sitting into w/c)  Bed to Chair: Dependent/Total Lossie Florence stedy)     OT  PT Equipment Recommendations  Other: continue to assess, pt states that she owns a RW  Toilet - Technique:  (gary alcantara)  Equipment Used: Standard toilet  Toilet Transfers Comments: Max Ax2 to/from standard toilet  Assessment        SLP          Body mass index is 31.61 kg/m². Assessment and Plan:  Right intracerebral hemorrhage with L hemiparesis  - Cont lipitor 40 mg daily, ASA 81 mg daily     UTI  UA: cloudy, large amoutn of LE, WBC 10-20, bacteria rare. - Urine culture in process  - Cont augmentin 875-125 mg BID     Systolic CHF (EF 00%)  - Cont coreg 25 mg BID,Lasix 20 mg daily     A Fib with ICD pacemaker  - Cont ASA (had been on Coumadin)     HTN  - Cont norvasc 5 mg daily, lisinopril 40 mg daily     HLD  - Cont lipitor 40 mg daily     Hypothyroidism  - Cont synthroid 50 mcg daily     Hx of rectal cancer s/p resection  - Colostomy present, monitor output    PPx: protonix 40 mg daily, lovenox 40 mg daily     ITP- Monitor     Bowels: Schedule Miralax + Senna S. Follow bowel movements. Enema or suppository if needed.      Bladder: Check PVR x 3.   [address] if PVR > 200ml or if any volume is > 500 ml.      Pain: Tylenol is ordered prn.        Rehab Progress: Improving  Anticipated Dispo: SNF  Services/DME: SOLO  ELOS: 6/17      [facility], D.O. M.P.H  PM&R  5/29/2021  10:02 AM

## 2021-05-29 NOTE — PLAN OF CARE
Problem: Falls - Risk of:  Goal: Will remain free from falls  Description: Will remain free from falls  Outcome: Ongoing     Problem: Skin Integrity:  Goal: Will show no infection signs and symptoms  Description: Will show no infection signs and symptoms  Outcome: Ongoing     Problem: Skin Integrity:  Goal: Absence of new skin breakdown  Description: Absence of new skin breakdown  5/29/2021 0026 by Sanam Jonas RN  Outcome: Ongoing     Problem: Pain:  Goal: Pain level will decrease  Description: Pain level will decrease  Outcome: Ongoing

## 2021-05-30 PROCEDURE — 6360000002 HC RX W HCPCS: Performed by: PHYSICAL MEDICINE & REHABILITATION

## 2021-05-30 PROCEDURE — 97112 NEUROMUSCULAR REEDUCATION: CPT

## 2021-05-30 PROCEDURE — 99231 SBSQ HOSP IP/OBS SF/LOW 25: CPT | Performed by: PHYSICAL MEDICINE & REHABILITATION

## 2021-05-30 PROCEDURE — 97530 THERAPEUTIC ACTIVITIES: CPT

## 2021-05-30 PROCEDURE — 6370000000 HC RX 637 (ALT 250 FOR IP): Performed by: PHYSICAL MEDICINE & REHABILITATION

## 2021-05-30 PROCEDURE — 97110 THERAPEUTIC EXERCISES: CPT

## 2021-05-30 PROCEDURE — 1280000000 HC REHAB R&B

## 2021-05-30 PROCEDURE — 97535 SELF CARE MNGMENT TRAINING: CPT

## 2021-05-30 PROCEDURE — 97129 THER IVNTJ 1ST 15 MIN: CPT

## 2021-05-30 PROCEDURE — 97130 THER IVNTJ EA ADDL 15 MIN: CPT

## 2021-05-30 RX ADMIN — LEVOTHYROXINE SODIUM 50 MCG: 0.05 TABLET ORAL at 07:01

## 2021-05-30 RX ADMIN — MAGNESIUM OXIDE TAB 400 MG (240 MG ELEMENTAL MG) 400 MG: 400 (240 MG) TAB at 21:09

## 2021-05-30 RX ADMIN — Medication 2000 UNITS: at 08:36

## 2021-05-30 RX ADMIN — CARVEDILOL 25 MG: 12.5 TABLET, FILM COATED ORAL at 17:35

## 2021-05-30 RX ADMIN — PREDNISONE 7.5 MG: 5 TABLET ORAL at 08:36

## 2021-05-30 RX ADMIN — LISINOPRIL 40 MG: 40 TABLET ORAL at 08:37

## 2021-05-30 RX ADMIN — CITALOPRAM HYDROBROMIDE 20 MG: 20 TABLET ORAL at 08:37

## 2021-05-30 RX ADMIN — ENOXAPARIN SODIUM 40 MG: 40 INJECTION SUBCUTANEOUS at 08:36

## 2021-05-30 RX ADMIN — FUROSEMIDE 20 MG: 20 TABLET ORAL at 08:37

## 2021-05-30 RX ADMIN — POLYETHYLENE GLYCOL 3350 17 G: 17 POWDER, FOR SOLUTION ORAL at 08:35

## 2021-05-30 RX ADMIN — CARVEDILOL 25 MG: 12.5 TABLET, FILM COATED ORAL at 08:36

## 2021-05-30 RX ADMIN — POTASSIUM CHLORIDE 20 MEQ: 20 TABLET, EXTENDED RELEASE ORAL at 08:36

## 2021-05-30 RX ADMIN — MAGNESIUM OXIDE TAB 400 MG (240 MG ELEMENTAL MG) 400 MG: 400 (240 MG) TAB at 17:35

## 2021-05-30 RX ADMIN — Medication 5 MG: at 21:09

## 2021-05-30 RX ADMIN — AMOXICILLIN AND CLAVULANATE POTASSIUM 1 TABLET: 875; 125 TABLET, FILM COATED ORAL at 08:37

## 2021-05-30 RX ADMIN — AMOXICILLIN AND CLAVULANATE POTASSIUM 1 TABLET: 875; 125 TABLET, FILM COATED ORAL at 21:09

## 2021-05-30 RX ADMIN — MAGNESIUM OXIDE TAB 400 MG (240 MG ELEMENTAL MG) 400 MG: 400 (240 MG) TAB at 13:45

## 2021-05-30 RX ADMIN — PANTOPRAZOLE SODIUM 40 MG: 40 TABLET, DELAYED RELEASE ORAL at 07:01

## 2021-05-30 RX ADMIN — AMLODIPINE BESYLATE 5 MG: 5 TABLET ORAL at 08:37

## 2021-05-30 RX ADMIN — ATORVASTATIN CALCIUM 40 MG: 40 TABLET, FILM COATED ORAL at 21:09

## 2021-05-30 RX ADMIN — ASPIRIN 81 MG: 81 TABLET, CHEWABLE ORAL at 08:36

## 2021-05-30 RX ADMIN — MAGNESIUM OXIDE TAB 400 MG (240 MG ELEMENTAL MG) 400 MG: 400 (240 MG) TAB at 08:36

## 2021-05-30 ASSESSMENT — PAIN SCALES - GENERAL
PAINLEVEL_OUTOF10: 0

## 2021-05-30 NOTE — PROGRESS NOTES
ACUTE REHAB UNIT  SPEECH/LANGUAGE PATHOLOGY      [x] Daily  [] Weekly Care Conference Note  [] Discharge    Patient: Benson Urbina      :1945  NFY:[id number]  Rehab Dx/Hx: 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, unspecified ([facility] [address] [I61.2]    Precautions: [] Aspiration  [x] Fall risk  [] Sternal  [] Seizure [] Hip  [] Weight Bearing [] Other  ST Dx: [] Aphasia  [] Dysarthria  [] Apraxia   [] Oropharyngeal dysphagia [x] Cognitive Impairment  [] Other:   Date of Admit: 2021  Room #: 3104/3104-01  Date: 2021          Current Diet Order:DIET GENERAL;  Dietary Nutrition Supplements: Standard High Calorie Oral Supplement   Vision  Vision: Impaired  Vision Exceptions: Wears glasses at all times  Hearing  Hearing: Within functional limits     Comments: Per admitting H&P (2021): 'Patient is a 68 yo female who originally admitted to hospital /-21 for Right intracerebral hemorrhage with L hemiparesis. She has a know past medical hx of systolic CHF (EF 08%), A Fib (on warfarin), ICD, HTN, Hypothyroidism, rectal cancer s/p resection, and ITP. Her hemorrhage became worse with a midline shift and decline in her neurologic status. Patient was started on seizure prophalxis, and neurosurgery followed, but no indication for surgery. She was also treated for UTI, anti-hypertensives adjusted to maintain bp in normal range  with ICH. She failed MBS and GI consulted who recommended PEG. She was also treated with zosyn for possible aspiration pneumonia. S/P PEG placed on 21. EGD recommended PPI therapy. She remained stable and was discharged to [facility]. Patient presented to SNF on 21. She improved with her swallowing and tolerated a regular diet,  and GI removed PEG on 21. Patient has made significant progress in her alertness and activity tolerance, and it is felt that she would benefit from and could tolerate intensive inpatient rehab unit treatment at this time. Prior to her ICH, she was independent will all ADLs and functional mobility without any AD. She is currently supervision for bed mobility, Max A for stand pivot transfer, Osei for UE dress, MaxA for LB dressing, Osei for slide board transfers, and supervision for WC mobility. She is highly motivated and able to participate in 3 hrs of therapy per day in ARU setting. '  Social/Functional History  Lives With: Spouse;Daughter  Active : Yes  Occupation: Unemployed  [address]: Enjoys going to the [employer]  Additional Comments: Manages own medications, finances    Barriers toward progress: medical status (current UTI)    Date: 5/30/2021     Tx session 1   Total Timed Code Min 30   Total Treatment Minutes 30   Individual Treatment Minutes 30   Group Treatment Minutes 0   Co-Treat Minutes 0   Brief Exception: N/A   Pain None indicated   Pain Intervention: [] RN notified  [] Repositioned  [] Intervention offered and patient declined  [x] N/A  [] Other: n/a   Subjective:     Pt in w/c upon entry, in good spirits and pleasant. Objective / Goals:    Patient will participate in ongoing cognitive-linguistic assessment. GOAL MET       Patient will complete tasks involving executive function skills with 90% accuracy/min cues. Checkbook register:  -Sequencing and entering line items: 86% accuracy with mod-max cues for attention to detail. Inconsistent with errors made. -Balancing checkbook: 67% accuracy with min cues  Perseverations noted x 3 w/o awareness - dependent to correct. Patient will complete graded recall tasks with 85% accuracy or min cues. Independently recalled working with this SLP previously but dependent for recall of name.      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 Minor perseverations demonstrated during tasks this date. Plan: Continue per plan of care.         Interventions used this date:  [] Speech/Language Treatment  [] Instruction in HEP  [] Dysphagia Treatment [x] Cognitive Treatment   [] Other:  Discharge recommendations:  [] Home independently  [x] Home with assistance []  24 hour supervision  [] ECF [] Other  Continued Tx Upon Discharge: ? [] Yes    [] No    [x] TBD based on progress while on ARU     [] Vital Stim indicated     [] Other:   Estimated discharge date: 6/12/21      Mai Albright MA CCC-SLP; [id number]  Speech-Language Pathologist

## 2021-05-30 NOTE — PROGRESS NOTES
Department of Physical Medicine & Rehabilitation  Progress Note    Patient Identification:  Andi Velazquez  [id number]  : 1945  Admit date: 2021    Chief Complaint: L hemiparesis due to R intracerebral hemorrhage    Subjective:   Doing well. No new complaints overnight. Improving slowly in therapy. ROS: No f/c, n/v, cp. Objective:  Patient Vitals for the past 24 hrs:   BP Temp Temp src Pulse Resp SpO2   21 0845 129/64 98.1 °F (36.7 °C) Oral 62 16    21 2047 113/66 98 °F (36.7 °C) Oral 74 18 95 %     Const: Alert. No distress, pleasant. HEENT: Normocephalic, atraumatic. Normal sclera/conjunctiva. MMM. CV: Regular rate and rhythm. Resp: No respiratory distress. Lungs CTAB. Abd: Soft, nontender, nondistended, NABS+   Ext: No edema. Neuro: Alert, oriented, appropriately interactive. Left neglect noted. Motor examination reveals normal strength in right side, 3-/5 strength left side diffusely. Psych: Cooperative, appropriate mood and affect    Laboratory data: Available via EMR. Last 24 hour lab  No results found for this or any previous visit (from the past 24 hour(s)). Therapy progress:  PT  Position Activity Restriction  Other position/activity restrictions: fall precautions  Objective     Sit to Stand: 2 Person Assistance, Moderate Assistance, Maximum Assistance (pt stood with max A x 2 from bed to stedy and max A x 2 from stedy to w/c.  She stood from raised mat table mod A x 2 for 5 stands and max A+mod A with sheet for sling x5.  max A x 2 again for stand from stedy to w/c.)  Stand to sit: Moderate Assistance, 2 Person Assistance, Maximum Assistance  Bed to Chair: Dependent/Total (via gary alcantara)     OT  PT Equipment Recommendations  Other: continue to assess, pt states that she owns a RW  Toilet - Technique:  (gary alcantara)  Equipment Used: Standard toilet  Toilet Transfers Comments: Max Ax2 to/from standard toilet  Assessment        SLP          Body mass index is 31.61 kg/m². Assessment and Plan:  Right intracerebral hemorrhage with L hemiparesis  - Cont lipitor 40 mg daily, ASA 81 mg daily     UTI  UA: cloudy, large amoutn of LE, WBC 10-20, bacteria rare. - Urine culture in process  - Cont augmentin 875-125 mg BID     Systolic CHF (EF 96%)  - Cont coreg 25 mg BID,Lasix 20 mg daily     A Fib with ICD pacemaker  - Cont ASA (had been on Coumadin)     HTN  - Cont norvasc 5 mg daily, lisinopril 40 mg daily     HLD  - Cont lipitor 40 mg daily     Hypothyroidism  - Cont synthroid 50 mcg daily     Hx of rectal cancer s/p resection  - Colostomy present, monitor output    PPx: protonix 40 mg daily, lovenox 40 mg daily     ITP- Monitor     Bowels: Schedule Miralax + Senna S. Follow bowel movements. Enema or suppository if needed.      Bladder: Check PVR x 3.   [address] if PVR > 200ml or if any volume is > 500 ml.      Pain: Tylenol is ordered prn.        Rehab Progress: Improving  Anticipated Dispo: SNF  Services/DME: TBD  ELOS: 6/17      KAT Wiseman.P.H  PM&R  5/30/2021  11:33 AM

## 2021-05-30 NOTE — PROGRESS NOTES
Physical Therapy  Facility/Department: [facility] ACUTE REHAB UNIT  Daily Treatment Note  NAME: Ana Martínez  : 1945  MRN: [id number]    Date of Service: 2021    Discharge Recommendations:  24 hour supervision or assist, Home with Home health PT   PT Equipment Recommendations  Other: continue to assess, pt states that she owns a RW    Assessment   Body structures, Functions, Activity limitations: Decreased functional mobility ; Decreased safe awareness;Decreased balance;Decreased cognition;Decreased vision/visual deficit; Decreased endurance;Decreased strength;Decreased sensation;Decreased fine motor control  Assessment: Pt with improving initiation with stands requiring less assist initially though still requiring heavy assist for upright posture and to achieve full hip extension in standing. Pt fatigues quickly wtih standing. Pad placed at the front of gary alcantara which did assist with knee pain during standing. The pt also demonstrated improved pelvic/trunk dissociation with sitting balance activities. She would benefit from further skilled PT to improve her endurance, LE strength and coordination and enable her to transfer with less assistance. Treatment Diagnosis: decreased functional mobility due to nontraumatic ICH  Prognosis: Good  PT Education: General Safety; Energy Conservation;Transfer Training;Orientation; Adaptive Device Training;Functional Mobility Training  Patient Education: Role of PT, goals, d/c recommendations, importance of protecting LUE, pt verbalized understanding  REQUIRES PT FOLLOW UP: Yes  Activity Tolerance  Activity Tolerance: Patient limited by fatigue;Patient limited by endurance; Patient limited by pain  Activity Tolerance: Pt requests rests between therapy activities     Patient Diagnosis(es): There were no encounter diagnoses.      has a past medical history of Arthritis, CAD (coronary artery disease), [facility] Cerebral artery occlusion with cerebral infarction [facility]), CHF (congestive heart failure) ([facility] [address] Hypertension, and Thyroid disease. has a past surgical history that includes Abdomen surgery; Hysterectomy; Colonoscopy; hernia repair; and pacemaker placement. Restrictions  Position Activity Restriction  Other position/activity restrictions: fall precautions  Subjective   General  Chart Reviewed: Yes  Additional Pertinent Hx: Pt is a 67 y.o. female admitted to ARU on 5/21/21 from SNF. Per MD note, pt originally \"admitted to [address] [place] [address] from 1/23/21-2/9/21 for intracerebral hemorrhage with L hemiparesis. She originally presented to the ER with left sided weakness and bp was 191/161 so she was started on Cleviprex gtt. She has a known hx of systolic CHF (EF 64%), A Fib (on warfarin), ICD, HTN, Hypothyroidism, rectal cancer s/p resection, and ITP. \"  Pt d/c to SNF on 2/9/21 and made significant progress. She now admits to ARU. Family / Caregiver Present: No  Referring Practitioner: Rogerio Mcgraw MD  Subjective  Subjective: Pt states that she is dressed and had just been to the bathroom and that she was ready to work with therapy. General Comment  Comments: Pt supine in bed when PT arrived. Pt agreeable to therapy  Pain Screening  Patient Currently in Pain: Denies  Vital Signs  Patient Currently in Pain: Denies       Orientation     Cognition      Objective   Bed mobility  Supine to Sit: Stand by assistance (HOB elevated and use of bedrail with cueing for technique)  Transfers  Sit to Stand: 2 Person Assistance; Moderate Assistance;Maximum Assistance (pt stood with max A x 2 from bed to stedy and max A x 2 from stedy to w/c.  She stood from raised mat table mod A x 2 for 5 stands and max A+mod A with sheet for sling x5.  max A x 2 again for stand from stedy to w/c.)  Stand to sit: Moderate Assistance;2 Person Assistance;Maximum Assistance  Bed to Chair: Dependent/Total (via gary alcantara)  Comment: Transfers required increased time; pt with improved placement of L UE despite its decreased strength and proprioception  Ambulation  Ambulation?: No  WB Status: Pt is not safe for ambulation at this time)  Stairs/Curb  Stairs?: No  Wheelchair Activities  Wheelchair Size: Initiated therapy in a 22\" standard  w/c but transitioned to a 20\" cong height w/c  Wheelchair Cushion: Pressure Relieving  Level of Assistance for pressure relief activities: Supervision  Wheelchair Parts Management: Yes  Left Brakes Level of Assistance: Minimal assistance (min A to fully engage brake and verbal cueing to find it)  Right Brakes Level of Assistance: Supervision (cueing to engage the brake)  Propulsion: Yes  Propulsion 1  Propulsion: Manual  Level: Level Tile  Method: RLE;LLE  Level of Assistance: Supervision  Description/ Details: Used BLES in a stepping pattern  Distance: 115' x 2     Balance  Comments: PT/OT worked collaboratively with pt sitting on EOM. Therapists placed about 15 bean bags in front of her and diagonal bilaterally with pt having to lean outside of JHOAN on mat table and retrieve the bean bag and reach to therapist's hand to place it in a different position with varying heights and outside of JHOAN. Pt able to utilize her L UE to retrieve bean bags today and did well reaching for therapist's hand. She used the R UE for bean bags to the R and the L UE for the bean bags to the L with the exception of one. Pt with goof hip clearance and one therapist behind her to assist with balance if needed though pt CGA and one in front ready to block her knees if needed. Pt CGA for task and with good hip clearance and trunk/pelvic dissociation with leaning. Exercises  Comments: Pt stood from raised mat 2 x 5; first 5 with mod A x 2 and then second 5 therapist using sling under her bottom to assist in full hip extension and upright posture with standing requiring max A +mod A for upright posture         Comment: Pt able to don her shoes in supine. 2nd session note:  The pt presents sitting up in w/c and willing to work with therapist.  Pt instructed in [address] and posterior chain strengthening with manual resistance from therapist- pt pushing her legs straight against therapist resistance. L LE much weaker than R LE with this activity x 20 kaya. OT came into treatment and pt requesting to use the restroom and nursing to shower her after therapy works with her. Pt transfers to [email or web address] from w/c with max A x 2 and then from ste she attempts a second stand over toilet to attempt getting pants down but she was unable. Therapists were able to have her lean while in the stedy to get her pants down. After a rest the pt was able to stand with max A x 2 from stedy to toilet and complete toileting tasks. She requested toilet paper for pericare as she was unable to reach it from on shower chair. Pt doffed her shoes, pants, and brief with cueing. Pt left with nursing staff at end of session with all needs met on shower chair ready to enter shower. Goals  Short term goals  Time Frame for Short term goals: 2 weeks  Short term goal 1: Pt will perform all bed mobility with Osei. Ongoing  Short term goal 2: Pt will perform squat pivot with MaxA. Ongoing  Short term goal 3: Pt will perform sit to/from stand with LRAD and MaxA. Ongoing  Short term goal 4: Pt will propel w/c 150' with supervision. Ongoing  Long term goals  Time Frame for Long term goals : 3-4 weeks  Long term goal 1: Pt will perform all bed mobility with supervision. Ongoing  Long term goal 2: Pt will perform squat pivot with CGA. Ongoing  Long term goal 3: Pt will perform sit to/from stand with LRAD and Osei. Ongoing  Long term goal 4: Pt will propel w/c 150' with Baltazar. Ongoing  Patient Goals   Patient goals : \"I want to walk again. \"    Plan    Plan  Times per week: 5x/week, 75 minutes a day  Current Treatment Recommendations: Strengthening, Neuromuscular Re-education, Home Exercise Program, ROM, Safety Education & Training, Balance Training, Endurance Training, Patient/Caregiver Education & Training, Functional Mobility Training, Wheelchair Mobility Training, Equipment Evaluation, Education, & procurement, Transfer Training, Gait Training  Safety Devices  Type of devices:  All fall risk precautions in place, Call light within reach, Chair alarm in place, Gait belt, Left in chair, Nurse notified     Therapy Time   Individual Concurrent Group Co-treatment   Time In       0730   Time Out       0830   Minutes       60   Timed Code Treatment Minutes: 60 Minutes    Timed Code Treatment Minutes:  [address] Time:   Individual Concurrent Group Co-treatment   Time In  7222     7034   Time Out  1243     7045   Minutes  5     20     Timed Code Treatment Minutes:  85 minutes    Total Treatment Minutes:  85 minutes    Total Treatment Minutes:  60 minutes      Diana Krueger, PT

## 2021-05-30 NOTE — PROGRESS NOTES
Family + in pt's room. RN is alerted per pt's daughter of pt w/ c/o \"cold sweat\", feeling uncomfortable upon arrival of dinner tray. Pt states she doesn't feel \"right\" since OOB to [facility].   RN assessment consistent with pt's baseline, Neuro at baseline, PERRLA @ 3mm; pt denies c/o CP, SOB, diplopia, N/V.  VSS /74,

## 2021-05-30 NOTE — PROGRESS NOTES
Occupational Therapy  Facility/Department: [facility] ACUTE REHAB UNIT  Daily Treatment Note  NAME: Alecia Prather  : 1945  MRN: [id number]    Date of Service: 2021    Discharge Recommendations:  Home with Home health OT, Home with nursing aide, 24 hour supervision or assist, Continue to assess pending progress  OT Equipment Recommendations  ADL Assistive Devices: Shower Chair with back  Other: Continue to assess for needs    Assessment   Performance deficits / Impairments: Decreased functional mobility ; Decreased cognition;Decreased ADL status; Decreased endurance;Decreased fine motor control;Decreased ROM; Decreased sensation;Decreased coordination;Decreased strength;Decreased balance;Decreased posture;Decreased safe awareness  Assessment: Pt demonstrated improved initiation w/ functional sit to stand transfers today w/ increased buttocks clearance noted and improved anterior weight shift noted. Pt however is very limited by B knee pain as pt is unable to extend through BLEs. Pt is also unable to extend hips for upright posture which limits pt ability to maintain stance. Pt was able to don shoes today in figure 4 position semi supine in bed w/ encouragement needed and VCs for success. Pt is motivated and continues to function well below baseline. Pt benefits from continued OT to maximize functional independence. Cont OT per POC  Treatment Diagnosis: Decreased ADL status and decreased ROM 2/2 CVA    OT Education: Precautions; ADL Adaptive Strategies;Transfer Training  Patient Education: pt verb understanding but benefits from reinforcement due to poor recall  REQUIRES OT FOLLOW UP: Yes  Activity Tolerance  Activity Tolerance: Patient Tolerated treatment well;Patient limited by fatigue  Activity Tolerance: Pt reported being tired at end of session. Pt c/o B knee pain in stance-biofreeze applied  Safety Devices  Safety Devices in place: Yes  Type of devices: Call light within reach; Chair alarm in place; Left in Control: Pt engaged in dynamic seated balance task at EOB to promote WB through BLEs and improve trunk control/ WS for improved functional transfers. Pt reached outside JHOAN w/ LUE and RUE to retreive ~15 bean bags. Pt reached in all directions and planes to retrieve bags w/ CGA-SBA. Pt w/ decreased ROM w/ LUE however pt was able to grasp and release bean bags w/ VCs for awareness of LUE location. Pt demonstrated great hip clearance while WS w/o LOB noted. Pt w/ increased fatigue w/ task however and required rest breaks throughout. Cognition  Arousal/Alertness: Appropriate responses to stimuli  Following Commands: Follows multistep commands with increased time; Follows multistep commands with repitition  Attention Span: Attends with cues to redirect  Memory: Decreased recall of recent events;Decreased short term memory  Safety Judgement: Decreased awareness of need for assistance  Problem Solving: Assistance required to generate solutions  Insights: Fully aware of deficits  Initiation: Requires cues for some  Sequencing: Requires cues for some  Cognition Comment: Pt w/ poor recall and unable to recall OT's name. Pt often forgetful of events from previous days. Perception  Unilateral Attention: Cues to attend left visual field;Cues to attend to left side of body  Initiation: Cues to initiate tasks             Upper Extremity Function  NDT Treatment: Sitting                       2nd session: Pt was seated in w/c upon arrival w/ PT present. Pt reported she was tired but agreeable to OT/PT. Co treat indicated to maximize functional independence. Functional transfers:  Sit <> stand- completed from w/c > gary stedy and gary stedy paddles to stance w/ max Ax2. Pt more limited by fatigue this session requiring increased assistance for anterior weight shift and buttocks clearance. ADLs:  Toileting- Assist x2 needed in stance in gary stedy to manage clothes down.  While seated on stedy paddles pt was able to Concurrent Group Co-treatment   Time In       0730   Time Out       0830   Minutes       60   Timed Code Treatment Minutes: 60 Minutes       Second Session Therapy Time:   Individual Concurrent Group Co-treatment   Time In       5927   Time Out       1205    Minutes       20     Timed Code Treatment Minutes:  20    Total Treatment Minutes:  60+20= [address],

## 2021-05-31 PROCEDURE — 6370000000 HC RX 637 (ALT 250 FOR IP): Performed by: PHYSICAL MEDICINE & REHABILITATION

## 2021-05-31 PROCEDURE — 6360000002 HC RX W HCPCS: Performed by: PHYSICAL MEDICINE & REHABILITATION

## 2021-05-31 PROCEDURE — 1280000000 HC REHAB R&B

## 2021-05-31 PROCEDURE — 99231 SBSQ HOSP IP/OBS SF/LOW 25: CPT | Performed by: PHYSICAL MEDICINE & REHABILITATION

## 2021-05-31 RX ADMIN — ASPIRIN 81 MG: 81 TABLET, CHEWABLE ORAL at 08:15

## 2021-05-31 RX ADMIN — MAGNESIUM OXIDE TAB 400 MG (240 MG ELEMENTAL MG) 400 MG: 400 (240 MG) TAB at 13:31

## 2021-05-31 RX ADMIN — CITALOPRAM HYDROBROMIDE 20 MG: 20 TABLET ORAL at 08:15

## 2021-05-31 RX ADMIN — MAGNESIUM OXIDE TAB 400 MG (240 MG ELEMENTAL MG) 400 MG: 400 (240 MG) TAB at 20:52

## 2021-05-31 RX ADMIN — POLYETHYLENE GLYCOL 3350 17 G: 17 POWDER, FOR SOLUTION ORAL at 08:15

## 2021-05-31 RX ADMIN — CARVEDILOL 25 MG: 12.5 TABLET, FILM COATED ORAL at 17:21

## 2021-05-31 RX ADMIN — ATORVASTATIN CALCIUM 40 MG: 40 TABLET, FILM COATED ORAL at 20:52

## 2021-05-31 RX ADMIN — Medication 2000 UNITS: at 08:16

## 2021-05-31 RX ADMIN — PANTOPRAZOLE SODIUM 40 MG: 40 TABLET, DELAYED RELEASE ORAL at 06:33

## 2021-05-31 RX ADMIN — PREDNISONE 7.5 MG: 5 TABLET ORAL at 08:16

## 2021-05-31 RX ADMIN — ENOXAPARIN SODIUM 40 MG: 40 INJECTION SUBCUTANEOUS at 08:15

## 2021-05-31 RX ADMIN — LEVOTHYROXINE SODIUM 50 MCG: 0.05 TABLET ORAL at 06:33

## 2021-05-31 RX ADMIN — Medication 5 MG: at 20:52

## 2021-05-31 RX ADMIN — POTASSIUM CHLORIDE 20 MEQ: 20 TABLET, EXTENDED RELEASE ORAL at 08:16

## 2021-05-31 RX ADMIN — MAGNESIUM OXIDE TAB 400 MG (240 MG ELEMENTAL MG) 400 MG: 400 (240 MG) TAB at 17:21

## 2021-05-31 RX ADMIN — AMLODIPINE BESYLATE 5 MG: 5 TABLET ORAL at 08:16

## 2021-05-31 RX ADMIN — FUROSEMIDE 20 MG: 20 TABLET ORAL at 08:15

## 2021-05-31 RX ADMIN — AMOXICILLIN AND CLAVULANATE POTASSIUM 1 TABLET: 875; 125 TABLET, FILM COATED ORAL at 20:52

## 2021-05-31 RX ADMIN — CARVEDILOL 25 MG: 12.5 TABLET, FILM COATED ORAL at 08:15

## 2021-05-31 RX ADMIN — AMOXICILLIN AND CLAVULANATE POTASSIUM 1 TABLET: 875; 125 TABLET, FILM COATED ORAL at 08:15

## 2021-05-31 RX ADMIN — LISINOPRIL 40 MG: 40 TABLET ORAL at 08:16

## 2021-05-31 RX ADMIN — MAGNESIUM OXIDE TAB 400 MG (240 MG ELEMENTAL MG) 400 MG: 400 (240 MG) TAB at 08:16

## 2021-05-31 ASSESSMENT — PAIN SCALES - GENERAL
PAINLEVEL_OUTOF10: 0

## 2021-05-31 NOTE — PROGRESS NOTES
Department of Physical Medicine & Rehabilitation  Progress Note    Patient Identification:  Gael Pham  [id number]  : 1945  Admit date: 2021    Chief Complaint: L hemiparesis due to R intracerebral hemorrhage    Subjective:   No new complaints overnight. She is improving slowly with therapy and feels like she is getting stronger. She is excited to have company today. ROS: No f/c, n/v, cp. Objective:  Patient Vitals for the past 24 hrs:   BP Temp Temp src Pulse Resp SpO2 Weight   21 0810 111/66 98.2 °F (36.8 °C) Oral 60 16 95 %    21 0631       167 lb 8.8 oz (76 kg)   21 105/64 97.7 °F (36.5 °C) Oral 73 16 96 %      Const: Alert. No distress, pleasant. HEENT: Normocephalic, atraumatic. Normal sclera/conjunctiva. MMM. CV: Regular rate and rhythm. Resp: No respiratory distress. Lungs CTAB. Abd: Soft, nontender, nondistended, NABS+   Ext: No edema. Neuro: Alert, oriented, appropriately interactive. Left neglect noted. Motor examination reveals normal strength in right side, 3-/5 strength left side diffusely. Psych: Cooperative, appropriate mood and affect    Laboratory data: Available via EMR. Last 24 hour lab  No results found for this or any previous visit (from the past 24 hour(s)). Therapy progress:  PT  Position Activity Restriction  Other position/activity restrictions: fall precautions  Objective     Sit to Stand: 2 Person Assistance, Moderate Assistance, Maximum Assistance (pt stood with max A x 2 from bed to stedy and max A x 2 from stedy to w/c.  She stood from raised mat table mod A x 2 for 5 stands and max A+mod A with sheet for sling x5.  max A x 2 again for stand from stedy to w/c.)  Stand to sit: Moderate Assistance, 2 Person Assistance, Maximum Assistance  Bed to Chair: Dependent/Total (via [address])     OT  PT Equipment Recommendations  Other: continue to assess, pt states that she owns a RW  Toilet - Technique:  (gary alcantara) Equipment Used: Standard toilet  Toilet Transfers Comments: Max Ax2 to/from standard toilet  Assessment        SLP          Body mass index is 30.65 kg/m². Assessment and Plan:  Right intracerebral hemorrhage with L hemiparesis  - Cont lipitor 40 mg daily, ASA 81 mg daily     UTI  UA: cloudy, large amoutn of LE, WBC 10-20, bacteria rare. - Urine culture in process  - Cont augmentin 875-125 mg BID     Systolic CHF (EF 22%)  - Cont coreg 25 mg BID,Lasix 20 mg daily     A Fib with ICD pacemaker  - Cont ASA (had been on Coumadin)     HTN  - Cont norvasc 5 mg daily, lisinopril 40 mg daily     HLD  - Cont lipitor 40 mg daily     Hypothyroidism  - Cont synthroid 50 mcg daily     Hx of rectal cancer s/p resection  - Colostomy present, monitor output    PPx: protonix 40 mg daily, lovenox 40 mg daily     ITP- Monitor     Bowels: Schedule Miralax + Senna S. Follow bowel movements. Enema or suppository if needed.      Bladder: Check PVR x 3.   [address] if PVR > 200ml or if any volume is > 500 ml.      Pain: Tylenol is ordered prn.        Rehab Progress: Improving  Anticipated Dispo: SNF  Services/DME: TBD  ELOS: 6/17      Bipin Bassett D.O. M.P.H  PM&R  5/31/2021  12:06 PM

## 2021-05-31 NOTE — PROGRESS NOTES
Shift assessment complete. VSS. A&Ox4. Denies pain at this time. Fall precautions in place. Patient up to chair for breakfast, returned to bed after breakfast. Colostomy changed and emptied. Call light within reach, bed alarm utilized, bedside table within reach.      Vitals:    05/31/21 0810   BP: 111/66   Pulse: 60   Resp: 16   Temp: 98.2 °F (36.8 °C)   SpO2: 95%

## 2021-05-31 NOTE — PLAN OF CARE
Problem: Pain:  Goal: Pain level will decrease  Description: Pain level will decrease  5/31/2021 1608 by Joel Alvarado RN  Outcome: Met This Shift  Note: Patient denies pain at this time. Problem: Falls - Risk of:  Goal: Will remain free from falls  Description: Will remain free from falls  5/31/2021 1608 by Joel Alvarado RN  Outcome: Ongoing  Note: Remains free from falls this shift. Assist x2-3 stedy and GB. Fall precautions in place. Calls out appropriately for assistance, call light within reach. Bed/chair alarm utilized throughout shift. Problem: Skin Integrity:  Goal: Absence of new skin breakdown  Description: Absence of new skin breakdown  5/31/2021 1608 by Joel Alvarado RN  Outcome: Ongoing  Note: Skin kept clean and dry throughout shift. Patient encouraged and assisted with repositioning throughout shift. Colostomy care provided this shift.

## 2021-05-31 NOTE — PLAN OF CARE
Problem: Falls - Risk of:  Goal: Will remain free from falls  Description: Will remain free from falls  Outcome: Ongoing  Note: Patient is alert and oriented to person, time and situation. Unable to recall what facility she is at. Forgetful at times. Fall precautions in place. Bed is in low and locked position. Bed alarm set. Call light and bedside table within reach. She has called appropriately for assistance. Problem: Skin Integrity:  Goal: Absence of new skin breakdown  Description: Absence of new skin breakdown  Outcome: Ongoing  Note: Multiple scattered bruises and small abrasions. Bilateral lower extremity brown and lashonda colored. Redness noted under right breast. Colostomy bad in place. Surrounding skin intact. Intermittent incontinent of urine. Skin cleansed after all episodes of incontinence. No new areas of skin breakdown noted. Problem: Pain:  Description: Pain management should include both nonpharmacologic and pharmacologic interventions. Goal: Pain level will decrease  Description: Pain level will decrease  Outcome: Ongoing  Note: Patient has denied pain. Instructed to call with any discomfort.

## 2021-05-31 NOTE — PROGRESS NOTES
Patient is alert and oriented. She knows that she is at a rehab facility but is unable to recall name. Patient's colostomy bag changed. Patient incontinent of urine Veronique care done. Patient repositioned. Instructed to call with any needs. Call light within reach.

## 2021-06-01 PROCEDURE — 6360000002 HC RX W HCPCS: Performed by: PHYSICAL MEDICINE & REHABILITATION

## 2021-06-01 PROCEDURE — 99232 SBSQ HOSP IP/OBS MODERATE 35: CPT | Performed by: PHYSICAL MEDICINE & REHABILITATION

## 2021-06-01 PROCEDURE — 97129 THER IVNTJ 1ST 15 MIN: CPT

## 2021-06-01 PROCEDURE — 97530 THERAPEUTIC ACTIVITIES: CPT

## 2021-06-01 PROCEDURE — 97530 THERAPEUTIC ACTIVITIES: CPT | Performed by: PHYSICAL THERAPIST

## 2021-06-01 PROCEDURE — 97535 SELF CARE MNGMENT TRAINING: CPT

## 2021-06-01 PROCEDURE — 6370000000 HC RX 637 (ALT 250 FOR IP): Performed by: PHYSICAL MEDICINE & REHABILITATION

## 2021-06-01 PROCEDURE — 1280000000 HC REHAB R&B

## 2021-06-01 PROCEDURE — 97542 WHEELCHAIR MNGMENT TRAINING: CPT | Performed by: PHYSICAL THERAPIST

## 2021-06-01 PROCEDURE — 97130 THER IVNTJ EA ADDL 15 MIN: CPT

## 2021-06-01 RX ORDER — AMOXICILLIN AND CLAVULANATE POTASSIUM 875; 125 MG/1; MG/1
1 TABLET, FILM COATED ORAL EVERY 12 HOURS SCHEDULED
Status: COMPLETED | OUTPATIENT
Start: 2021-06-01 | End: 2021-06-04

## 2021-06-01 RX ADMIN — ACETAMINOPHEN 650 MG: 325 TABLET ORAL at 20:10

## 2021-06-01 RX ADMIN — MAGNESIUM OXIDE TAB 400 MG (240 MG ELEMENTAL MG) 400 MG: 400 (240 MG) TAB at 20:10

## 2021-06-01 RX ADMIN — PREDNISONE 7.5 MG: 5 TABLET ORAL at 08:51

## 2021-06-01 RX ADMIN — POLYETHYLENE GLYCOL 3350 17 G: 17 POWDER, FOR SOLUTION ORAL at 08:52

## 2021-06-01 RX ADMIN — POTASSIUM CHLORIDE 20 MEQ: 20 TABLET, EXTENDED RELEASE ORAL at 08:50

## 2021-06-01 RX ADMIN — ATORVASTATIN CALCIUM 40 MG: 40 TABLET, FILM COATED ORAL at 20:10

## 2021-06-01 RX ADMIN — ENOXAPARIN SODIUM 40 MG: 40 INJECTION SUBCUTANEOUS at 08:52

## 2021-06-01 RX ADMIN — ACETAMINOPHEN 650 MG: 325 TABLET ORAL at 08:52

## 2021-06-01 RX ADMIN — CITALOPRAM HYDROBROMIDE 20 MG: 20 TABLET ORAL at 08:49

## 2021-06-01 RX ADMIN — MAGNESIUM OXIDE TAB 400 MG (240 MG ELEMENTAL MG) 400 MG: 400 (240 MG) TAB at 17:16

## 2021-06-01 RX ADMIN — FUROSEMIDE 20 MG: 20 TABLET ORAL at 08:50

## 2021-06-01 RX ADMIN — ASPIRIN 81 MG: 81 TABLET, CHEWABLE ORAL at 08:50

## 2021-06-01 RX ADMIN — MAGNESIUM OXIDE TAB 400 MG (240 MG ELEMENTAL MG) 400 MG: 400 (240 MG) TAB at 08:49

## 2021-06-01 RX ADMIN — PANTOPRAZOLE SODIUM 40 MG: 40 TABLET, DELAYED RELEASE ORAL at 06:49

## 2021-06-01 RX ADMIN — AMOXICILLIN AND CLAVULANATE POTASSIUM 1 TABLET: 875; 125 TABLET, FILM COATED ORAL at 20:10

## 2021-06-01 RX ADMIN — Medication 5 MG: at 20:10

## 2021-06-01 RX ADMIN — Medication 2000 UNITS: at 08:53

## 2021-06-01 RX ADMIN — DICLOFENAC SODIUM 2 G: 10 GEL TOPICAL at 12:43

## 2021-06-01 RX ADMIN — DICLOFENAC SODIUM 2 G: 10 GEL TOPICAL at 20:11

## 2021-06-01 RX ADMIN — MAGNESIUM OXIDE TAB 400 MG (240 MG ELEMENTAL MG) 400 MG: 400 (240 MG) TAB at 12:45

## 2021-06-01 RX ADMIN — LISINOPRIL 40 MG: 40 TABLET ORAL at 08:50

## 2021-06-01 RX ADMIN — CARVEDILOL 25 MG: 12.5 TABLET, FILM COATED ORAL at 08:51

## 2021-06-01 RX ADMIN — AMLODIPINE BESYLATE 5 MG: 5 TABLET ORAL at 08:51

## 2021-06-01 RX ADMIN — AMOXICILLIN AND CLAVULANATE POTASSIUM 1 TABLET: 875; 125 TABLET, FILM COATED ORAL at 08:50

## 2021-06-01 RX ADMIN — CARVEDILOL 25 MG: 12.5 TABLET, FILM COATED ORAL at 18:28

## 2021-06-01 RX ADMIN — LEVOTHYROXINE SODIUM 50 MCG: 0.05 TABLET ORAL at 06:49

## 2021-06-01 ASSESSMENT — PAIN DESCRIPTION - FREQUENCY: FREQUENCY: CONTINUOUS

## 2021-06-01 ASSESSMENT — PAIN DESCRIPTION - ORIENTATION: ORIENTATION: MID;RIGHT;LEFT

## 2021-06-01 ASSESSMENT — PAIN SCALES - GENERAL
PAINLEVEL_OUTOF10: 2
PAINLEVEL_OUTOF10: 1
PAINLEVEL_OUTOF10: 0
PAINLEVEL_OUTOF10: 0
PAINLEVEL_OUTOF10: 3
PAINLEVEL_OUTOF10: 2

## 2021-06-01 ASSESSMENT — PAIN DESCRIPTION - LOCATION: LOCATION: HEAD;KNEE

## 2021-06-01 ASSESSMENT — PAIN DESCRIPTION - PAIN TYPE: TYPE: ACUTE PAIN;CHRONIC PAIN

## 2021-06-01 ASSESSMENT — PAIN DESCRIPTION - DESCRIPTORS: DESCRIPTORS: HEADACHE;ACHING

## 2021-06-01 ASSESSMENT — PAIN - FUNCTIONAL ASSESSMENT: PAIN_FUNCTIONAL_ASSESSMENT: PREVENTS OR INTERFERES SOME ACTIVE ACTIVITIES AND ADLS

## 2021-06-01 NOTE — PLAN OF CARE
Problem: Falls - Risk of:  Goal: Will remain free from falls  Description: Will remain free from falls  5/31/2021 2302 by Laura Patterson RN  Outcome: Ongoing     Problem: Falls - Risk of:  Goal: Absence of physical injury  Description: Absence of physical injury  Outcome: Ongoing     Problem: Skin Integrity:  Goal: Will show no infection signs and symptoms  Description: Will show no infection signs and symptoms  Outcome: Ongoing

## 2021-06-01 NOTE — PROGRESS NOTES
Department of Physical Medicine & Rehabilitation  Progress Note    Patient Identification:  Alberto Rondon  [id number]  : 1945  Admit date: 2021    Chief Complaint: L hemiparesis due to R intracerebral hemorrhage    Subjective:   Feels well. No new complaints overnight. She is participating well with therapy and showing improvement with left sided neglect and movement. ROS: No f/c, n/v, cp. Objective:  Patient Vitals for the past 24 hrs:   BP Temp Temp src Pulse Resp SpO2   21 0846 115/72 98.1 °F (36.7 °C) Oral 65 16 94 %   21 2045 112/73 98 °F (36.7 °C) Oral 73 16 93 %   21 1715 107/64   69 16      Const: Alert. No distress, pleasant. HEENT: Normocephalic, atraumatic. Normal sclera/conjunctiva. MMM. CV: Regular rate and rhythm. Resp: No respiratory distress. Lungs CTAB. Abd: Soft, nontender, nondistended, NABS+   Ext: No edema. Neuro: Alert, oriented, appropriately interactive. Left neglect noted. Motor examination reveals normal strength in right side, 3-/5 strength left side diffusely. Psych: Cooperative, appropriate mood and affect    Laboratory data: Available via EMR. Last 24 hour lab  No results found for this or any previous visit (from the past 24 hour(s)). Therapy progress:  PT  Position Activity Restriction  Other position/activity restrictions: fall precautions  Objective     Sit to Stand: Dependent/Total (Utilized a gary stedy which pt actively participated transitioning from sitting to standing from elevated bed and  commode. Req Max A x2 .  Unable to assume an erect standing position)  Stand to sit: Dependent/Total, 2 Person Assistance ([address] stedy so was dependently positioned back into sitting into w/c)  Bed to Chair: Dependent/Total (via gary stedy)     OT  PT Equipment Recommendations  Other: continue to assess, pt states that she owns a RW  Toilet - Technique:  Shanelle Rule stedy)  Equipment Used: Raised toilet seat with rails  Toilet Transfers Comments: Max Ax2 to/from standard toilet  Assessment        SLP          Body mass index is 30.65 kg/m². Assessment and Plan:  Right intracerebral hemorrhage with L hemiparesis  - Cont lipitor 40 mg daily, ASA 81 mg daily     UTI  UA: cloudy, large amoutn of LE, WBC 10-20, bacteria rare. - Urine culture in process  - Cont augmentin 875-125 mg BID     Systolic CHF (EF 96%)  - Cont coreg 25 mg BID,Lasix 20 mg daily     A Fib with ICD pacemaker  - Cont ASA (had been on Coumadin)     HTN  - Cont norvasc 5 mg daily, lisinopril 40 mg daily     HLD  - Cont lipitor 40 mg daily     Hypothyroidism  - Cont synthroid 50 mcg daily     Hx of rectal cancer s/p resection  - Colostomy present, monitor output    PPx: protonix 40 mg daily, lovenox 40 mg daily     ITP- Monitor     Bowels: Schedule Miralax + Senna S. Follow bowel movements. Enema or suppository if needed.      Bladder: Check PVR x 3.   [facility] if PVR > 200ml or if any volume is > 500 ml.      Pain: Tylenol is ordered prn.        Rehab Progress: Improving  Anticipated Dispo: SNF  Services/DME: TBD  ELOS: 6/17      Ranjith Briseno D.O. M.P.H  PM&R  6/1/2021  10:21 AM

## 2021-06-01 NOTE — PROGRESS NOTES
ACUTE REHAB UNIT  SPEECH/LANGUAGE PATHOLOGY      [x] Daily  [] Weekly Care Conference Note  [] Discharge    Patient: Sagrario Mendoza      :1945  ZVD:[id number]  Rehab Dx/Hx: 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, unspecified ([facility] [address] [I61.2]    Precautions: [] Aspiration  [x] Fall risk  [] Sternal  [] Seizure [] Hip  [] Weight Bearing [] Other  ST Dx: [] Aphasia  [] Dysarthria  [] Apraxia   [] Oropharyngeal dysphagia [x] Cognitive Impairment  [] Other:   Date of Admit: 2021  Room #: 3104/3104-01  Date: 2021          Current Diet Order:DIET GENERAL;  Dietary Nutrition Supplements: Standard High Calorie Oral Supplement   Vision  Vision: Impaired  Vision Exceptions: Wears glasses at all times  Hearing  Hearing: Within functional limits     Comments: Per admitting H&P (2021): 'Patient is a 68 yo female who originally admitted to hospital /-21 for Right intracerebral hemorrhage with L hemiparesis. She has a know past medical hx of systolic CHF (EF 24%), A Fib (on warfarin), ICD, HTN, Hypothyroidism, rectal cancer s/p resection, and ITP. Her hemorrhage became worse with a midline shift and decline in her neurologic status. Patient was started on seizure prophalxis, and neurosurgery followed, but no indication for surgery. She was also treated for UTI, anti-hypertensives adjusted to maintain bp in normal range  with ICH. She failed MBS and GI consulted who recommended PEG. She was also treated with zosyn for possible aspiration pneumonia. S/P PEG placed on 21. EGD recommended PPI therapy. She remained stable and was discharged to [facility]". Patient presented to SNF on 21. She improved with her swallowing and tolerated a regular diet,  and GI removed PEG on 21. Patient has made significant progress in her alertness and activity tolerance, and it is felt that she would benefit from and could tolerate intensive inpatient rehab unit treatment at this time. Prior to her ICH, she was independent will all ADLs and functional mobility without any AD. She is currently supervision for bed mobility, Max A for stand pivot transfer, Osei for UE dress, MaxA for LB dressing, Osei for slide board transfers, and supervision for WC mobility. She is highly motivated and able to participate in 3 hrs of therapy per day in ARU setting. '  Social/Functional History  Lives With: Spouse;Daughter  Active : Yes  Occupation: Unemployed  [address]: Enjoys going to the [employer]  Additional Comments: Manages own medications, finances    Barriers toward progress: medical status (current UTI)    Date: 6/1/2021     Tx session 1   Total Timed Code Min 30   Total Treatment Minutes 30   Individual Treatment Minutes 30   Group Treatment Minutes 0   Co-Treat Minutes 0   Brief Exception: N/A   Pain None indicated   Pain Intervention: [] RN notified  [] Repositioned  [] Intervention offered and patient declined  [x] N/A  [] Other:    Subjective:     Patient awake, alert, pleasant. Seen seated up in chair. Participated well during therapy session. Objective / Goals:    Patient will participate in ongoing cognitive-linguistic assessment. GOAL MET       Patient will complete tasks involving executive function skills with 90% accuracy/min cues. Required min cues for accuracy with verbal manipulation/sequencing task. Patient will complete graded recall tasks with 85% accuracy or min cues. Targeted via delayed recall of novel verbal information. Pt required mod decreasing to min cues with implementation of recall strategies. Other areas targeted: n/a   Education:   Educated pt re: purpose of visit, recall strategies, ongoing ST.      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 Plan: Continue per plan of care.         Interventions used this date:  [] Speech/Language Treatment  [] Instruction in HEP  [] Dysphagia Treatment [x] Cognitive Treatment   [] Other:  Discharge recommendations:  [] Home independently  [x] Home with assistance []  24 hour supervision  [] ECF [] Other  Continued Tx Upon Discharge: ? [] Yes    [] No    [x] TBD based on progress while on ARU     [] Vital Stim indicated     [] Other:   Estimated discharge date: 6/12/21      Bobby Castellanos., [address]  Speech-Language Pathologist

## 2021-06-01 NOTE — PLAN OF CARE
Problem: Falls - Risk of:  Goal: Will remain free from falls  Description: Will remain free from falls  6/1/2021 0940 by Shira Weldon RN  Outcome: Ongoing  Note: Pt educated to use her call light when she needs assistance. Pt also educated not to get up unassisted at this time. Bed alarm on when Pt in bed and chair alarm on when Pt up in chair. Non skid socks on and call light placed within reach.  RN will continue to monitor Pt.    5/31/2021 8912 by Scott Olivas RN  Outcome: Ongoing

## 2021-06-01 NOTE — PROGRESS NOTES
Physical Therapy  Facility/Department: [facility] ACUTE REHAB UNIT  Daily Treatment Note  NAME: Wyatt Coyne  : 1945  MRN: [id number]    Date of Service: 2021    Discharge Recommendations:  24 hour supervision or assist, Home with Home health PT   PT Equipment Recommendations  Other: continue to assess, pt states that she owns a RW    Assessment   Body structures, Functions, Activity limitations: Decreased functional mobility ; Decreased safe awareness;Decreased balance;Decreased cognition;Decreased vision/visual deficit; Decreased endurance;Decreased strength;Decreased sensation;Decreased fine motor control  Assessment: . At this time,therapy continues using the Sammuel Muscat stedy for transf is best possible option until pt's status improves. Pt cont to maximize effort with each session despite the increased c/o fatigue with activity. Pt's poor proprioception on L side in both the UE and LE present a challenge with functional use. Therapy provides both VC/ TC for safety with positioning and use. Pt is well below baseline and would benefit from cont therapy to maximize potential and increase functional obility towards Ind to allow for a safer d/c to home. Treatment Diagnosis: decreased functional mobility due to nontraumatic ICH  Decision Making: Medium Complexity  PT Education: General Safety; Energy Conservation;Transfer Training;Orientation; Adaptive Device Training;Functional Mobility Training  Patient Education: Role of PT, goals, d/c recommendations, importance of protecting LUE, pt verbalized understanding  Activity Tolerance  Activity Tolerance: Patient limited by fatigue;Patient limited by endurance; Patient limited by pain  Activity Tolerance: Pt requests rests between therapy activities and was consistently reporting increased fatigue on this date     Patient Diagnosis(es): There were no encounter diagnoses.      has a past medical history of Arthritis, CAD (coronary artery disease), Cancer ([facility] [address] Cerebral artery occlusion with cerebral infarction [facility]), CHF (congestive heart failure) ([facility] [address] Hypertension, and Thyroid disease. has a past surgical history that includes Abdomen surgery; Hysterectomy; Colonoscopy; hernia repair; and pacemaker placement. Restrictions  Position Activity Restriction  Other position/activity restrictions: fall precautions  Subjective   General  Additional Pertinent Hx: Pt is a 67 y.o. female admitted to ARU on 5/21/21 from SNF. Per MD note, pt originally \"admitted to [address] [place] [address] from 1/23/21-2/9/21 for intracerebral hemorrhage with L hemiparesis. She originally presented to the ER with left sided weakness and bp was 191/161 so she was started on Cleviprex gtt. She has a known hx of systolic CHF (EF 64%), A Fib (on warfarin), ICD, HTN, Hypothyroidism, rectal cancer s/p resection, and ITP. \"  Pt d/c to SNF on 2/9/21 and made significant progress. She now admits to ARU. Family / Caregiver Present: No  Referring Practitioner: Itzel Chavarria MD  Subjective  Subjective: Pt consistently reported throughout session  that she was extremely tired. Pt also requested to use the restroom. General Comment  Comments: Pt supine in bed when therapy arrived. Pt agreeable to therapy  Pain Screening  Patient Currently in Pain: Yes  Pain Assessment  Pain Level:  (Did not rate)  Patient's Stated Pain Goal: No pain  Multiple Pain Sites: Yes (Head pain and B knee pain)  Vital Signs  Patient Currently in Pain: Yes       Orientation  Orientation  Overall Orientation Status: Within Functional Limits (Pt presents with STM with inability to recall activities that occurred in past 24 hours.)  Cognition      Objective    PT and OT worked collaboratively to utilize the skills of 2 disciplines while maximizing functional mobility to obtain optimal potential.   Bed mobility  Supine to Sit: Contact guard assistance (HOB elevated + use of bed rail.  VCs needed for technique especially an anterior WS) Transfers  Sit to Stand: Dependent/Total (Utilized a gary stedy which pt actively participated transitioning from sitting to standing from elevated bed and  commode. Req Max A x2 . Unable to assume an erect standing position)  Stand to sit: Dependent/Total;2 Person Assistance ([address] stedy so was dependently positioned back into sitting into w/c)  Comment: Note, transf req additional time with each step sequenced to produce a successful transition. Pt is able to move all 4 extrem but has decreased proprioception on the L side with both LUE and LLE . Both VC and TC req for posistioning of L extrem. A piece of dicem used under the L foot  to minimze pt's L LE since pt with intermitterently bends the LLE back resulting in LLE NWB. Ambulation  Ambulation?: No  WB Status: Pt is not safe for ambulation at this time)  Stairs/Curb  Stairs?: No  Wheelchair Activities  Wheelchair Size: 20\" cong height w/c  Wheelchair Cushion: Pressure Relieving  Level of Assistance for pressure relief activities: Modified independent  Wheelchair Parts Management: Yes  Left Brakes Level of Assistance: Contact guard assistance (VC to locate and fully engage the brake with physical A to manage the brake)  Right Brakes Level of Assistance: Supervision (VC to locate and fully engage the brake,)  Propulsion: Yes  Propulsion 1  Propulsion: Manual  Level: Level Tile  Method: RLE;LLE  Level of Assistance: Supervision;Modified independent (MOD I for propulsion with intermittent VC for placement of LUE)  Description/ Details: Used BLES in a stepping pattern but req VC for positioning of LLE especially as pt fatigued and increased delay on moving LLE  Distance: 300' x1   Comment: Therapy assisted pt with donning B socks and shoes prior to getting to EOB in prep for transf with the gary alcantara. With the gary stedy, pt was tramsported to commode in which pt was then able to urinate in commode and was set up for pericare.  Pt  completed dressing tasks while sitting on commode. This included donning , briefs, pants and shoes. .Refer to OT note for pt's status with dressing. Pt demo Fair plus sitting balance demo the ability to lean forward and laterally to manage pants/ briefs. Pt utilized the GB with VC and TC from therapists  with the LUE req repositioning multiple times 2/2 to the poor proprioception. Pt req additional time to complete these tasks and for additional rests 2/2 to reports of fatigue with each task. Second session: Pt sitting in w/c when therapy arrived. Pt agreeable to therapy. PT and OT worked collaboratively to utilize the skills of 2 disciplines while maximizing functional mobility to obtain optimal potential.     As a strengthening ex/ endurance activity for all 4 extrem,therapy instructed pt with use of the seated stepper. Pt utilized all 4 extrem and obtained/maintained a pace that lit the screen up for ~1 minute. Pt demo SOB. Pt then attempted to use LES only ( alicia ~3 minutes but was unable to to maintain a pace that would consistently light the screen up. Pt req A to maintain L hand position. Pt also req rest period 2/2 to SOB. Wheelchair Activities  Wheelchair Size: 20\" cong height w/c  Wheelchair Cushion: Pressure Relieving  Level of Assistance for pressure relief activities: Modified independent  Propulsion 1  Propulsion: Manual  Level: Level Tile  Method: RLE;LLE  Level of Assistance: Modified independent  Distance: 150' x2  Therapy collaborated with IDT and it was decided that pt is now allowed to be untethered from the wall in the w/c on the unit. Pt returned to room and remained in wheel chair. Call light and phone placed within reach. Chair alarm reactivated   G-Code     OutComes Score                                                     AM-PAC Score             Goals  Short term goals  Time Frame for Short term goals: 2 weeks  Short term goal 1: Pt will perform all bed mobility with Osei.  Ongoing  Short term goal 2: Pt will perform squat pivot with MaxA. Ongoing  Short term goal 3: Pt will perform sit to/from stand with LRAD and MaxA. Ongoing  Short term goal 4: Pt will propel w/c 150' with supervision. Ongoing  Long term goals  Time Frame for Long term goals : 3-4 weeks  Long term goal 1: Pt will perform all bed mobility with supervision. Ongoing  Long term goal 2: Pt will perform squat pivot with CGA. Ongoing  Long term goal 3: Pt will perform sit to/from stand with LRAD and Osei. Ongoing  Long term goal 4: Pt will propel w/c 150' with Baltazar. Ongoing  Patient Goals   Patient goals : \"I want to walk again. \"    Plan    Plan  Times per week: 5x/week, 75 minutes a day  Current Treatment Recommendations: Strengthening, Neuromuscular Re-education, Home Exercise Program, ROM, Safety Education & Training, Balance Training, Endurance Training, Patient/Caregiver Education & Training, Functional Mobility Training, Wheelchair Mobility Training, Equipment Evaluation, Education, & procurement, Transfer Training, Gait Training  Safety Devices  Type of devices:  All fall risk precautions in place, Call light within reach, Chair alarm in place, Gait belt, Left in chair, Nurse notified     Therapy Time   Individual Concurrent Group Co-treatment   Time In       730   Time Out       830   Minutes       60     Second Session Therapy Time:   Individual Concurrent Group Co-treatment   Time In      1130   Time Out      1200   Minutes      30     Timed Code Treatment Minutes:  60+30    Total Treatment Minutes:  [address] Silva, PT

## 2021-06-01 NOTE — PROGRESS NOTES
diagnoses. has a past medical history of Arthritis, CAD (coronary artery disease), Cancer ([facility] [address] Cerebral artery occlusion with cerebral infarction [facility]), CHF (congestive heart failure) ([facility] [address] Hypertension, and Thyroid disease. has a past surgical history that includes Abdomen surgery; Hysterectomy; Colonoscopy; hernia repair; and pacemaker placement. Restrictions  Position Activity Restriction  Other position/activity restrictions: fall precautions  Subjective   General  Chart Reviewed: Yes  Patient assessed for rehabilitation services?: Yes  Additional Pertinent Hx: Per MD AURA and P:Patient is a 68 yo female who originally [facility] for Right intracerebral hemorrhage with L hemiparesis. She has a know past medical hx of systolic CHF (EF 29%), A Fib (on warfarin), ICD, HTN, Hypothyroidism, rectal cancer s/p resection, and ITP. Her hemorrhage became worse with a midline shift and decline in her neurologic status. Patient was started on seizure prophalxis, and neurosurgery followed, but no indication for surgery. She was also treated for UTI, anti-hypertensives adjusted to maintain bp in normal range  with ICH. She failed MBS and GI consulted who recommended PEG. She was also treated with zosyn for possible aspiration pneumonia. S/P PEG placed on 2/6/21. EGD recommended PPI therapy. She remained stable and was discharged to [facility]. Patient presented to SNF on 2/9/21. She improved with her swallowing and tolerated a regular diet,  and GI removed PEG on 5/18/21. Pt had been at the [facility] since February and has now exhausted her days left at the SNF. Pt admitted to ARU 5/21  Family / Caregiver Present: No  Referring Practitioner: Dr. Shannan Macias  Diagnosis: CVA  Subjective  Subjective: Pt was semi supine in bed upon arrival. Pt was just waking up and appeared very tired. Pt was pleasant and agreeable to OT/PT. Co treat indicated to maximize functional independence.     Vital Signs Patient Currently in Pain: Yes (Pt c/o B knee pain, neck pain, and head pain. Pt did not rate pain. RN notified)   Orientation  Orientation  Overall Orientation Status: Within Functional Limits (Pt required increased time to respond to questions)    Objective    ADL  Feeding: Setup (Assistance needed to open packages)  Grooming: Setup (Pt maintained stance in [facility] to complete oral care and comb hair. Pt required assistance to squeeze toothpaste onto toothbrush)  LE Dressing: Setup;Verbal cueing; Increased time to complete;Dependent/Total (Pt was able to thread BLEs into underwear and pants seated on RTS w/ ++ time needed and max VCs for success. Pt required max A x2 in stance in [facility] to pull clothes over hips. Pt donned shoes w/ ++ time and max VCs)  Toileting: Dependent/Total (Pt required max Ax2 in [facility] to manage clothes up and down. Pt completed rear marilyn care seated w/ setup assist)  Additional Comments: ADLs completed w/ above listed levels of assistance needed. Pt maintained stance in [facility] for over 7 mins to complete pants management and grooming routine. Pt was very fatigued w/ ADL tasks however and required several rest breaks. Pt also required some encouragement to attempt ADLs due to pt stating \"I can't I'm too tired\". Pt was pleasantly surprised w/ how much she could do herself. Balance  Sitting Balance: Stand by assistance (seated EOB)  Standing Balance: Dependent/Total (stance in [facility])  Standing Balance  Time: ~7 mins total  Activity: Stance in [facility] for pants management and oral care  Functional Mobility  Functional - Mobility Device: Wheelchair  Activity: To/From therapy gym  Assist Level: Supervision  Functional Mobility Comments: Pt self propelled w/c to/from therapy gym w/ spvn. Toilet Transfers  Toilet - Technique:  Yogi alcantara)  Equipment Used: Raised toilet seat with rails  Toilet Transfer: Dependent/Total;2 Person assistance  Toilet Transfers Comments:  Max Ax2 to/from standard toilet    Bed mobility  Supine to Sit: Contact guard assistance (HOB elevated + use of bed rail. VCs needed for technique)    Transfers  Sit to stand: Dependent/Total;2 Person assistance (Max Ax2 needed from EOB to gary stedy)  Stand to sit: Dependent/Total;2 Person assistance (Max A x2 from stedy to w/c)                         Cognition  Arousal/Alertness: Delayed responses to stimuli  Following Commands: Follows multistep commands with increased time; Follows multistep commands with repitition  Attention Span: Attends with cues to redirect  Memory: Decreased recall of recent events;Decreased short term memory  Safety Judgement: Decreased awareness of need for assistance  Problem Solving: Assistance required to generate solutions  Insights: Fully aware of deficits  Initiation: Requires cues for some  Sequencing: Requires cues for some  Cognition Comment: Pt w/ poor recall and unable to recall OT's name. Pt often forgetful of events from previous days. Pt required + time to respond to orientation questions this morning and pt appeared more fatigued. Perception  Unilateral Attention: Cues to attend left visual field;Cues to attend to left side of body  Initiation: Cues to initiate tasks                                2nd session:   Pt was seated in w/c upon arrival. Pt was getting her ostomy bag emptied. Pt was pleasant and agreeable to OT/PT. Co treat indicated to maximize functional independence. Functional mobility:  Pt self propelled w/c to/from therapy gym w/ spvn. GM coordination:  Pt engaged in seated stepper activity as a whole body coordination task to increase BUE and BLE strength for improved functional transfers and improved activity tolerance. Pt used BUEs and BLEs for ~1 min w/ occasional TCs to support LUE. Pt also used BLEs only for ~1.5 mins w/ intermittent stoppage due to inability to keep the screen lit up.  Pt completed an additional 1.5 mins w/ BUEs and BLEs w/ fair pace noted. Pt again required min TCs to prevent LUE from sliding off of handle. Pt was limited by increased fatigue and required frequent rest breaks. Pt was left seated in w/c at end of session w/ chair alarm on and call light within reach.  Lunch tray delivered                    Plan   Plan  Times per week: 5x a week for 75 mins daily  Times per day: Daily  Current Treatment Recommendations: Strengthening, Wheelchair Mobility Training, Positioning, ROM, Safety Education & Training, Balance Training, Patient/Caregiver Education & Training, Self-Care / ADL, Cognitive/Perceptual Training, Functional Mobility Training, Neuromuscular Re-education, Equipment Evaluation, Education, & procurement, Home Management Training, Endurance Training  G-Code     OutComes Score                                                  AM-PAC Score             Goals  Short term goals  Time Frame for Short term goals: 2 weeks  Short term goal 1: Pt will complete toilet transfer w/ Mod A-ongoing  Short term goal 2: Pt will complete LE dressing supine in bed w/ Min A- ongoing  Short term goal 3: Pt will complete UE dressing w/ Min A-goal met 5/25  Short term goal 4: Pt will complete bathing tasks w/ Min A-ongoing  Short term goal 5: Pt will demonstrate improved functional use of LUE as evident by pt ability to open grooming containers I'ly-ongoing  Long term goals  Time Frame for Long term goals : 4 weeks- all goals ongoing  Long term goal 1: Pt will complete toilet transfer w/ CGA  Long term goal 2: Pt will complete toileting tasks w/ CGA  Long term goal 3: Pt will complete LE dressing (either supine or bed or seated ) w/ CGA  Long term goal 4: Pt will complete UE dressing w/ setup and spvn  Long term goal 5: Pt will complete bathing tasks w/ SBA  Patient Goals   Patient goals : \"be able to walk\"       Therapy Time   Individual Concurrent Group Co-treatment   Time In       0730   Time Out       0830   Minutes       60   Timed Code Treatment Minutes: 60 Minutes       Second Session Therapy Time:   Individual Concurrent Group Co-treatment   Time In        1130   Time Out        1200   Minutes        30     Timed Code Treatment Minutes:  30    Total Treatment Minutes:  60+30= [id number], OT

## 2021-06-01 NOTE — PROGRESS NOTES
Pt awake in wheelchair eating breakfast. Physical assessment and vital signs as charted. Pt currently rates her pain as a 2 out of 10 on the pain scale, pain medication given at this time. Call light placed within reach. RN will continue to monitor Pt.

## 2021-06-01 NOTE — PROGRESS NOTES
Shift assessment complete. VSS, A/Ox4, fall precautions in place. Call light in reach. Pt denies pain or discomfort. Pt had colostomy bag changed at beginning of shift. Nightly meds givens. No issues. Will continue to monitor until end of shift.        Vitals:    05/31/21 2045   BP: 112/73   Pulse: 73   Resp: 16   Temp: 98 °F (36.7 °C)   SpO2: 93%

## 2021-06-02 LAB
ANION GAP SERPL CALCULATED.3IONS-SCNC: 12 MMOL/L (ref 3–16)
BASOPHILS ABSOLUTE: 0 K/UL (ref 0–0.2)
BASOPHILS RELATIVE PERCENT: 0.4 %
BUN BLDV-MCNC: 12 MG/DL (ref 7–20)
CALCIUM SERPL-MCNC: 9.5 MG/DL (ref 8.3–10.6)
CHLORIDE BLD-SCNC: 99 MMOL/L (ref 99–110)
CO2: 22 MMOL/L (ref 21–32)
CREAT SERPL-MCNC: <0.5 MG/DL (ref 0.6–1.2)
EOSINOPHILS ABSOLUTE: 0.2 K/UL (ref 0–0.6)
EOSINOPHILS RELATIVE PERCENT: 1.7 %
GFR AFRICAN AMERICAN: >60
GFR NON-AFRICAN AMERICAN: >60
GLUCOSE BLD-MCNC: 148 MG/DL (ref 70–99)
HCT VFR BLD CALC: 37.5 % (ref 36–48)
HEMOGLOBIN: 12.9 G/DL (ref 12–16)
LYMPHOCYTES ABSOLUTE: 0.5 K/UL (ref 1–5.1)
LYMPHOCYTES RELATIVE PERCENT: 5.2 %
MCH RBC QN AUTO: 31.4 PG (ref 26–34)
MCHC RBC AUTO-ENTMCNC: 34.3 G/DL (ref 31–36)
MCV RBC AUTO: 91.4 FL (ref 80–100)
MONOCYTES ABSOLUTE: 0.9 K/UL (ref 0–1.3)
MONOCYTES RELATIVE PERCENT: 9.2 %
NEUTROPHILS ABSOLUTE: 8.5 K/UL (ref 1.7–7.7)
NEUTROPHILS RELATIVE PERCENT: 83.5 %
PDW BLD-RTO: 15 % (ref 12.4–15.4)
PLATELET # BLD: 211 K/UL (ref 135–450)
PMV BLD AUTO: 7.5 FL (ref 5–10.5)
POTASSIUM SERPL-SCNC: 4.4 MMOL/L (ref 3.5–5.1)
RBC # BLD: 4.1 M/UL (ref 4–5.2)
SODIUM BLD-SCNC: 133 MMOL/L (ref 136–145)
WBC # BLD: 10.1 K/UL (ref 4–11)

## 2021-06-02 PROCEDURE — 6370000000 HC RX 637 (ALT 250 FOR IP): Performed by: PHYSICAL MEDICINE & REHABILITATION

## 2021-06-02 PROCEDURE — 1280000000 HC REHAB R&B

## 2021-06-02 PROCEDURE — 97535 SELF CARE MNGMENT TRAINING: CPT

## 2021-06-02 PROCEDURE — 6360000002 HC RX W HCPCS: Performed by: PHYSICAL MEDICINE & REHABILITATION

## 2021-06-02 PROCEDURE — 36415 COLL VENOUS BLD VENIPUNCTURE: CPT

## 2021-06-02 PROCEDURE — 97530 THERAPEUTIC ACTIVITIES: CPT | Performed by: PHYSICAL THERAPIST

## 2021-06-02 PROCEDURE — 97110 THERAPEUTIC EXERCISES: CPT | Performed by: PHYSICAL THERAPIST

## 2021-06-02 PROCEDURE — 97530 THERAPEUTIC ACTIVITIES: CPT

## 2021-06-02 PROCEDURE — 99232 SBSQ HOSP IP/OBS MODERATE 35: CPT | Performed by: PHYSICAL MEDICINE & REHABILITATION

## 2021-06-02 PROCEDURE — 97129 THER IVNTJ 1ST 15 MIN: CPT

## 2021-06-02 PROCEDURE — 85025 COMPLETE CBC W/AUTO DIFF WBC: CPT

## 2021-06-02 PROCEDURE — 2500000003 HC RX 250 WO HCPCS: Performed by: PHYSICAL MEDICINE & REHABILITATION

## 2021-06-02 PROCEDURE — 80048 BASIC METABOLIC PNL TOTAL CA: CPT

## 2021-06-02 PROCEDURE — 97542 WHEELCHAIR MNGMENT TRAINING: CPT | Performed by: PHYSICAL THERAPIST

## 2021-06-02 PROCEDURE — 97130 THER IVNTJ EA ADDL 15 MIN: CPT

## 2021-06-02 RX ADMIN — POTASSIUM CHLORIDE 20 MEQ: 20 TABLET, EXTENDED RELEASE ORAL at 09:26

## 2021-06-02 RX ADMIN — Medication 5 MG: at 21:25

## 2021-06-02 RX ADMIN — PANTOPRAZOLE SODIUM 40 MG: 40 TABLET, DELAYED RELEASE ORAL at 06:26

## 2021-06-02 RX ADMIN — PREDNISONE 7.5 MG: 5 TABLET ORAL at 09:26

## 2021-06-02 RX ADMIN — CARVEDILOL 25 MG: 12.5 TABLET, FILM COATED ORAL at 16:47

## 2021-06-02 RX ADMIN — LEVOTHYROXINE SODIUM 50 MCG: 0.05 TABLET ORAL at 06:26

## 2021-06-02 RX ADMIN — DICLOFENAC SODIUM 2 G: 10 GEL TOPICAL at 09:35

## 2021-06-02 RX ADMIN — ATORVASTATIN CALCIUM 40 MG: 40 TABLET, FILM COATED ORAL at 21:25

## 2021-06-02 RX ADMIN — FUROSEMIDE 20 MG: 20 TABLET ORAL at 09:24

## 2021-06-02 RX ADMIN — AMOXICILLIN AND CLAVULANATE POTASSIUM 1 TABLET: 875; 125 TABLET, FILM COATED ORAL at 09:25

## 2021-06-02 RX ADMIN — Medication 2000 UNITS: at 09:25

## 2021-06-02 RX ADMIN — MAGNESIUM OXIDE TAB 400 MG (240 MG ELEMENTAL MG) 400 MG: 400 (240 MG) TAB at 21:25

## 2021-06-02 RX ADMIN — AMLODIPINE BESYLATE 5 MG: 5 TABLET ORAL at 09:24

## 2021-06-02 RX ADMIN — ASPIRIN 81 MG: 81 TABLET, CHEWABLE ORAL at 09:25

## 2021-06-02 RX ADMIN — CARVEDILOL 25 MG: 12.5 TABLET, FILM COATED ORAL at 09:26

## 2021-06-02 RX ADMIN — ENOXAPARIN SODIUM 40 MG: 40 INJECTION SUBCUTANEOUS at 09:27

## 2021-06-02 RX ADMIN — LISINOPRIL 40 MG: 40 TABLET ORAL at 09:25

## 2021-06-02 RX ADMIN — MAGNESIUM OXIDE TAB 400 MG (240 MG ELEMENTAL MG) 400 MG: 400 (240 MG) TAB at 09:25

## 2021-06-02 RX ADMIN — MAGNESIUM OXIDE TAB 400 MG (240 MG ELEMENTAL MG) 400 MG: 400 (240 MG) TAB at 16:47

## 2021-06-02 RX ADMIN — MICONAZOLE NITRATE: 20 POWDER TOPICAL at 21:27

## 2021-06-02 RX ADMIN — MAGNESIUM OXIDE TAB 400 MG (240 MG ELEMENTAL MG) 400 MG: 400 (240 MG) TAB at 12:25

## 2021-06-02 RX ADMIN — DICLOFENAC SODIUM 2 G: 10 GEL TOPICAL at 21:25

## 2021-06-02 RX ADMIN — CITALOPRAM HYDROBROMIDE 20 MG: 20 TABLET ORAL at 09:25

## 2021-06-02 RX ADMIN — AMOXICILLIN AND CLAVULANATE POTASSIUM 1 TABLET: 875; 125 TABLET, FILM COATED ORAL at 21:25

## 2021-06-02 ASSESSMENT — PAIN DESCRIPTION - LOCATION
LOCATION: KNEE

## 2021-06-02 ASSESSMENT — PAIN DESCRIPTION - ONSET
ONSET: ON-GOING

## 2021-06-02 ASSESSMENT — PAIN - FUNCTIONAL ASSESSMENT
PAIN_FUNCTIONAL_ASSESSMENT: PREVENTS OR INTERFERES SOME ACTIVE ACTIVITIES AND ADLS

## 2021-06-02 ASSESSMENT — PAIN SCALES - GENERAL
PAINLEVEL_OUTOF10: 1
PAINLEVEL_OUTOF10: 0
PAINLEVEL_OUTOF10: 1
PAINLEVEL_OUTOF10: 0

## 2021-06-02 ASSESSMENT — PAIN DESCRIPTION - PAIN TYPE
TYPE: CHRONIC PAIN
TYPE: CHRONIC PAIN

## 2021-06-02 ASSESSMENT — PAIN DESCRIPTION - FREQUENCY
FREQUENCY: INTERMITTENT

## 2021-06-02 ASSESSMENT — PAIN DESCRIPTION - DESCRIPTORS
DESCRIPTORS: ACHING;SORE
DESCRIPTORS: SORE
DESCRIPTORS: SORE

## 2021-06-02 ASSESSMENT — PAIN DESCRIPTION - ORIENTATION
ORIENTATION: RIGHT;LEFT

## 2021-06-02 NOTE — PROGRESS NOTES
VSS - afebrile. Pt is alert and oriented x 4 with no history of falls. Assessment completed as charted. Bed is in lowest position with 3/4 bed rails raised, bed alarm turned on, wheels locked and call light within reach - patient verbalizes understanding to call out for assistance. No further requests at this time. Will continue to monitor.        Vitals:    06/01/21 1951   BP: 134/77   Pulse: 76   Resp: 19   Temp: 97.8 °F (36.6 °C)   SpO2: 97%

## 2021-06-02 NOTE — PATIENT CARE CONFERENCE
Inpatient Rehabilitation  Weekly Team Conference Note  The [address]  [phone]  Patient Name: Binu Monterroso        MRN: [id number]    : 1945  (67 y.o.)  Gender: female   Referring Practitioner: Sher Zarate MD  Diagnosis: nontraumatic ICH    The team conference for this patient was held on 6/3/2021 at 10:30am by:  Timothy Riggs DO    Current/Goal QM SCORES  QM Current/Goal Score   Eating CARE Score: 5 / Discharge Goal: Independent   Oral Hygiene CARE Score: 5 / Discharge Goal: Independent   Shower/Bathing CARE Score: 2 / Discharge Goal: Supervision or touching assistance   UB Dressing CARE Score: 3 / Discharge Goal: Supervision or touching assistance   LB Dressing CARE Score: 2 / Discharge Goal: Supervision or touching assistance   Putting on/off Footwear CARE Score: 2 / Discharge Goal: Supervision or touching assistance   Toileting Hygiene CARE Score: 1 / Discharge Goal: Supervision or touching assistance   Bladder Continence Bladder Continence: Incontinent daily    Bowel Continence Bowel Continence: Not rated    Toilet Transfers CARE Score: 1 / Discharge Goal: Supervision or touching assistance   Shower/Bathe Self  CARE Score: 2 / Discharge Goal: Supervision or touching assistance   Rolling Left and Right CARE Score: 3 / Discharge Goal: Supervision or touching assistance   Sit to Lying CARE Score: 4 / Discharge Goal: Supervision or touching assistance   Lying to Sitting on Bedside CARE Score: 3 / Discharge Goal: Supervision or touching assistance   Sit to Stand CARE Score: 1 / Discharge Goal: Partial/moderate assistance   Chair/Bed to Chair Transfer CARE Score: 1 / Discharge Goal: Supervision or touching assistance   Car Transfers CARE Score: 1 / Discharge Goal: Partial/moderate assistance   Walk 10 Feet CARE Score: 9 / Discharge Goal: Not Applicable   Walk 50 Feet with Two Turns CARE Score: 9 / Discharge Goal: Not Applicable Walk 150 Feet CARE Score: 9 / Discharge Goal: Not Applicable   Walk 10 Feet on Uneven Surfaces CARE Score: 9 / Discharge Goal: Not Applicable   1 Step (Curb) CARE Score: 9 / Discharge Goal: Not Applicable   4 Steps CARE Score: 9 / Discharge Goal: Not Applicable   12 Steps CARE Score: 9 / Discharge Goal: Not Applicable   Picking up Object from Floor CARE Score: 88 / Discharge Goal: Independent   Wheel 50 Feet with 2 Turns CARE Score: 4 / Discharge Goal: Independent   Type         [] Manual        [] Motorized        [] N/A   Wheel 150 Feet CARE Score: 88 / Discharge Goal: Independent   Type         [] Manual        [] Motorized        [] N/A     NURSING:  A&Ox: Level of Consciousness: Alert (0)  Orientation Level: Oriented X4  [place] Fall Risk Score: Score: 35  Admission BIMS: 9   [] Unable to complete BIMS on Admission, Reasoning:   Wounds/Incisions/Ulcers: Scattered bruising and abrasion, redness under right breast, healed peg tube site, ostomy, laceration to left hand, middle, ring and index finger. Blanchable redness to buttocks. Medication Review: Yes  Pain: Chronic pain to bilateral left knee being treated with PRN tylenol, Voltaren gel, and non pharmaceutical intervention. Consultations: None. Imaging: none  No orders to display     Active Comorbid Conditions: UTI, Arthritis, CHF, HTN, CHF, CAD,   Systems Review:   Renal: Urinary frequency/incontinent, Dialysis: No  Type: N/A, Frequency: N/A  Neurological: X  Musculoskeletal: X  Respiratory: X  Cardiac/Circulatory/Peripheral Vascular: X  Abnormal/Relevant Test Results:   Abnormal/Relevant Lab Values:   CBC:   Recent Labs     06/02/21  1142   WBC 10.1   HGB 12.9   HCT 37.5   MCV 91.4        BMP:   Recent Labs     06/02/21  1141   *   K 4.4   CL 99   CO2 22   BUN 12   CREATININE <0.5*     PT/INR: No results for input(s): PROTIME, INR in the last 72 hours. APTT: No results for input(s): APTT in the last 72 hours.   Liver Profile:  No results found for: AST, ALT, ALB, BILIDIR, BILITOT, ALKPHOS, GGT, 5NUCNo results found for: CHOL, HDL, TRIG  Recent Labs     06/02/21  1142   WBC 10.1   HGB 12.9   HCT 37.5      MCV 91.4     Recent Labs     06/02/21  1141   *   K 4.4   CL 99   CO2 22   BUN 12   CREATININE <0.5*     No results for input(s): AST, ALT, ALB, BILIDIR, BILITOT, ALKPHOS in the last 72 hours. No results for input(s): MG in the last 72 hours. Recent Labs     06/02/21  1142   WBC 10.1   HGB 12.9   HCT 37.5        Recent Labs     06/02/21  1141   *   K 4.4   CL 99   CO2 22   BUN 12   CREATININE <0.5*   CALCIUM 9.5     No results for input(s): AST, ALT, BILIDIR, BILITOT, ALKPHOS in the last 72 hours. No results for input(s): INR in the last 72 hours. No results for input(s): Jasmin Ends in the last 72 hours. PHYSICAL THERAPY:  Bed Mobility:      Transfers:  Sit to Stand: Dependent/Total (Utilized a gary stedy which pt actively participated transitioning from sitting to standing from elevated bed and  commode. Req Max A x2 . Unable to assume an erect standing position)  Stand to sit: Dependent/Total, 2 Person Assistance ([address] stedy so was dependently positioned back into sitting into w/c)  Bed to Chair: Dependent/Total (via gary stedy)  Squat Pivot Transfers: 2 Person Assistance, Dependent/Total (w/c > standing frame  (Utilized [employer] w/ transition( R side leading req Max A x2 (Lower surface > higher surface) Leading w/ L side standing frame > w/c( higher  > lower surface using beasy board)  req CGA . (dependent on placement of  beasy board)  Comment: Therapy assisted pt with donning B socks and shoes prior to getting to EOB in prep for transf with the gary stedy. with the gavino stedy, pt was tramsported to commode in which pt was then able to urinate in commode and was set up for pericare. Pt ten completed dressing tasks while sitting on commode.  This included donning , briefs, pants and shirt. Refer to OT note for pt's status with dressing. Pt demo Fair plus sitting balance demo the ability to lean forward and laterally to manage pants/ briefs. Pt utilized the GB with the luE req repositioning multiple times 2/2 to the poor proprioception. WB Status: Pt is not safe for ambulation at this time)         OCCUPATIONAL THERAPY:  ADL  Feeding: Setup (Assistance needed to open containers)  Grooming: Setup (Pt maintained stance in gary stedy to complete oral care and comb hair. Pt required assistance to squeeze toothpaste onto toothbrush)  UE Bathing: Setup, Verbal cueing, Increased time to complete, Minimal assistance (Pt initiated washing chest, abdomen, and BUEs w/ RUE. Pt was able to hold the wash cloth in LUE to reach RUE but w/ a weak grasp noted resulting in decreased thoroughness. Min A was needed to completely wash and dry RUE)  LE Bathing: Maximum assistance (Pt was able to was top of B thighs but required assistance to reach BLEs and to wash buttocks from seated position.)  UE Dressing: Setup, Verbal cueing, Minimal assistance (Pt donned tshirt and jacket w/ min A to assist w/ LUE into the jacket. VCs needed for technique.)  LE Dressing: Setup, Verbal cueing, Increased time to complete, Maximum assistance (OT assisted w/ threading pants and underwear in bed. Pt attempted semi supine but c/o increased knee pain and was unable to complete. Pt rolled L and R to pull clothes over hips w/ assist needed to pull up on the L)  Toileting: Dependent/Total (Pt required max Ax2 in gary stedy to manage clothes up and down. Pt completed rear marilyn care seated w/ setup assist)  Additional Comments: OT applied elastic shoelaces today to increase independence w/ footwear. OT also applied tenso shape to LUE to prevent skin injury due to presence of many bruises from pt's poor awareness of LUE    Toilet Transfers:   Toilet Transfers  Toilet - Technique:  [place] alcantara)  Equipment Used: Raised toilet seat with rails Toilet Transfer: Dependent/Total, 2 Person assistance  Toilet Transfers Comments: Max Ax2 to/from standard toilet    Tub/ShowerTransfers:     Shower Transfers  Shower - Transfer From: Wheelchair (rolling shower chair)  Shower - Transfer Type: To and From  Shower - Transfer To:  (rolling shower chair)  Shower Transfers: Dependent  Shower Transfers Comments: Pt required Max Ax2 from w/c > rolling shower chair. Pt was assisted in rolling shower chair into the shower. This was also utilized for toileting. OT recommended use of rolling shower chair for toileting to allow pt to always transfer to her R side as transfer to her L was highly unsuccessful. SPEECH THERAPY:  Assessment: Speech Therapy Diagnosis  Cognitive Diagnosis: Mild cognitive-linguistic deficit (specifically executive function)    NUTRITION:  Please see nutrition note for details. Weight: 167 lb 8.8 oz (76 kg) / Body mass index is 30.65 kg/m². Current diet order:  DIET GENERAL;  Dietary Nutrition Supplements: Standard High Calorie Oral Supplement         Feeding: Able to feed self  Room Service: Selective   NSG Recorded PO: PO Meals Eaten (%): 76 - 100% PO Supplement (%):  (Just opened - will measure later )  Malnutrition Status Malnutrition Status: At risk for malnutrition (Comment)    CASE MANAGEMENT:  Psychosocial/Behavioral Issues: none noted  Assessment:  Continue to follow for d/c needs. Patient plans to return home with  on d/c.  Daughters plan to assist.    Patient/Family Education provided by team:  Role of PT/OT, stroke recovery, LUE awareness, safe transfers     Patient Goals for Rehab stay:  1. get back home        Rehab Team Goals for patient for the Upcoming Week:  1. increase independence w/ functional transfers  2. Increase independence w/ ADLs       Barriers to Progress/Attainment of Goals & Efforts/Interventions to remove Barriers:  1. Decrease  knee pain- bio freeze  2. Decreased proprioception  in LUE/ LE  3.  Decreased overall endurance      Discharge Plan:  Estimated Length of Stay: 23 days  Destination: home health and discharge home with supervision  Services at Discharge: [facility], Occupational Therapy and Speech Therapy 2x week  Equipment at Discharge: TBD   Community Resources:   Factors facilitating achievement of predicted outcomes: Family support  Barriers to the achievement of predicted outcomes: Stairs at home    Special Needs in the Upcoming Week:   [x] Family/Caregiver Education  [] Home visit  []Therapeutic Pass   [] Equipment  Type:   [] Consults:_______   [] Family/Caregiver Training    [] Stroke Risk Factor Education     [] Other;_______     TEAM SUMMARY: Will continue with current poc & goals until anticipated d/c date of 6/11/2021. MD:   Stroke Risk Factors:   [] N/A for this patient [x] HTN  []  Diabetes  [] Hyperlipidemia  []Obesity BMI >25  [x] Atrial Fibrillation [] Smoker (current)  [] Smoker (quit in last 12 months)  [] Sleep Apnea  [x] Other:  On anticoag    Risk for Readmission: Low Moderate - 10%    Justification for Continued Stay:   Criteria for continued IRF stay:  Based on my medical assessment of the patient and review of information from the interdisciplinary team, as part of this weekly team conference, the patient continues to meet the following criteria for IRF level of care:  [x] The patient requires 24-hour rehabilitation nursing care   [x] The patient requires an intensive rehabilitation therapy program  [x] The patient requires active and ongoing intervention of multiple therapy disciplines  [x] The patient requires continued physician supervision by a rehabilitation physician  [x] The patient requires an intensive and coordinated interdisciplinary team approach to the delivery of rehabilitative care    Medical Necessity-continued close physician medical management is required for:   [] Cardiac/Circulatory dysfunction  [] Respiratory/Pulmonary dysfunction  [] Integumentary complications  [] Peripheral Vascular dysfunction  [] Musculoskeletal dysfunction  [] Neurological dysfunction d/t:  [] CVA  [] SCI  [] TBI  [] Other: __________  [] Renal dysfunction  [] Hematologic dysfunction    [] Endocrine disorders  [] GI disorders     [] Genito-Urinary dysfunction    Assessment/Plan:  [] The patient is making good progression towards their LTG's, is actively participating in, and has a reasonable expectation to continue to benefit from the intensive rehabilitation program.  [] The estimated discharge date has been changed from initial team conference due to:   [] The estimated discharge destination has been changed from initial team conference due to:     Rehab Team Members in attendance for Team Conference:  :  NATALIYA Subramanian    Social Work:  Gerard Reese, MSW, SUZIES    Nursing:  SAMUEL OcampoN, RN    Washington Wolff, KARISHMA Olivas, KARISHMA Charles, RN    Therapy:  Odalis Pickett, PT  Luke Wren, PT, DPT    Keiry Blake, OTR/L  Dylon Goins, OTR/L  Waylon, OTR/L    Pritesh Vasquez MA/CCC-SLP    Nutrition:  CROW [place]    Nutrition:  CROW [place]    I approve the established interdisciplinary plan of care as documented within the medical record of [facility].     JOSE ArchuletaO. M.P.H  PM&R  6/3/2021  9:36 PM

## 2021-06-02 NOTE — PROGRESS NOTES
Department of Physical Medicine & Rehabilitation  Progress Note    Patient Identification:  Vi Maynard  [id number]  : 1945  Admit date: 2021    Chief Complaint: L hemiparesis due to R intracerebral hemorrhage    Subjective:   Continues to work with therapy. No new complaints overnight. Lab work to be done today. ROS: No f/c, n/v, cp. Objective:  Patient Vitals for the past 24 hrs:   BP Temp Temp src Pulse Resp SpO2   21 0922 107/66 98.1 °F (36.7 °C) Oral 70 18 95 %   21 195 134/77 97.8 °F (36.6 °C) Oral 76 19 97 %   21 1827 114/77   75     21 1712 (!) 101/58   71       Const: Alert. No distress, pleasant. HEENT: Normocephalic, atraumatic. Normal sclera/conjunctiva. MMM. CV: Regular rate and rhythm. Resp: No respiratory distress. Lungs CTAB. Abd: Soft, nontender, nondistended, NABS+   Ext: No edema. Neuro: Alert, oriented, appropriately interactive. Left neglect noted. Motor examination reveals normal strength in right side, 3-/5 strength left side diffusely. Psych: Cooperative, appropriate mood and affect    Laboratory data: Available via EMR. Last 24 hour lab  No results found for this or any previous visit (from the past 24 hour(s)). Therapy progress:  PT  Position Activity Restriction  Other position/activity restrictions: fall precautions  Objective     Sit to Stand: Dependent/Total (Utilized a gary stedy which pt actively participated transitioning from sitting to standing from elevated bed and  commode. Req Max A x2 .  Unable to assume an erect standing position)  Stand to sit: Dependent/Total, 2 Person Assistance (Utilized the gary stedy so was dependently positioned back into sitting into w/c)  Bed to Chair: Dependent/Total (via gary stedy)     OT  PT Equipment Recommendations  Other: continue to assess, pt states that she owns a RW  Toilet - Technique:  Joe alcantara)  Equipment Used: Raised toilet seat with rails  Toilet Transfers Comments: Max Ax2 to/from standard toilet  Assessment        SLP          Body mass index is 30.65 kg/m². Assessment and Plan:  Right intracerebral hemorrhage with L hemiparesis  - Cont lipitor 40 mg daily, ASA 81 mg daily     UTI  UA: cloudy, large amoutn of LE, WBC 10-20, bacteria rare. - Urine culture in process  - Cont augmentin 875-125 mg BID     Systolic CHF (EF 41%)  - Cont coreg 25 mg BID,Lasix 20 mg daily     A Fib with ICD pacemaker  - Cont ASA (had been on Coumadin)     HTN  - Cont norvasc 5 mg daily, lisinopril 40 mg daily     HLD  - Cont lipitor 40 mg daily     Hypothyroidism  - Cont synthroid 50 mcg daily     Hx of rectal cancer s/p resection  - Colostomy present, monitor output    PPx: protonix 40 mg daily, lovenox 40 mg daily     ITP- Monitor     Bowels: Schedule Miralax + Senna S. Follow bowel movements. Enema or suppository if needed.      Bladder: Check PVR x 3.   [address] if PVR > 200ml or if any volume is > 500 ml.      Pain: Tylenol is ordered prn.        Rehab Progress: Improving  Anticipated Dispo: SNF  Services/DME: TBD  ELOS: 6/17      Nora Choudhury, D.O. M.P.H  PM&R  6/2/2021  10:08 AM

## 2021-06-02 NOTE — PROGRESS NOTES
Occupational Therapy  Facility/Department: [facility] ACUTE REHAB UNIT  Daily Treatment Note  NAME: Maxx Goldberg  : 1945  MRN: [id number]    Date of Service: 2021    Discharge Recommendations:  Home with Home health OT, Home with nursing aide, 24 hour supervision or assist, Continue to assess pending progress  OT Equipment Recommendations  ADL Assistive Devices: Shower Chair with back  Other: Continue to assess for needs    Assessment   Performance deficits / Impairments: Decreased functional mobility ; Decreased cognition;Decreased ADL status; Decreased endurance;Decreased fine motor control;Decreased ROM; Decreased sensation;Decreased coordination;Decreased strength;Decreased balance;Decreased posture;Decreased safe awareness  Assessment: Pt demonstrated great improvement w/ functional transfers today as pt was able to complete scoot pivot transfers from EOB > w/c and w/c <> EOM w/ CGA x2- Min Ax2. Pt demonstrated decreased assistance for sit to stand transfers today as well, requiring min A x2 from EOM to [place] walker. Pt is very limited by B knee pain w/ RLE lacking 30 degrees of extension. Pt remains highly motivated and continues to benefit from OT. Cont OT per POC  Treatment Diagnosis: Decreased ADL status and decreased ROM 2/2 CVA    OT Education: ADL Adaptive Strategies;Transfer Training;Precautions; Home Exercise Program  Patient Education: pt verb understanding but benefits from reinforcement due to poor recall  REQUIRES OT FOLLOW UP: Yes  Activity Tolerance  Activity Tolerance: Patient Tolerated treatment well;Patient limited by pain; Patient limited by fatigue  Activity Tolerance: Pt w/ increased fatigue which appears to be related to early morning therapy time. Pt also c/o increased knee pain w/ standing tasks today  Safety Devices  Safety Devices in place: Yes  Type of devices: Call light within reach; Chair alarm in place; Left in chair;Nurse notified         Patient Diagnosis(es): There were no Signs  Patient Currently in Pain: Yes (Pt c/o head, LUE, and LLE pain throughout session during exercises. Pt reported pain subsided after exercises.)     Orientation     Objective    ADL  Feeding: Setup (Assistance needed to open containers)  UE Dressing: Setup;Verbal cueing;Minimal assistance (Pt donned tshirt and jacket w/ min A to assist w/ LUE into the jacket. VCs needed for technique.)  LE Dressing: Setup;Verbal cueing; Increased time to complete;Maximum assistance (OT assisted w/ threading pants and underwear in bed. Pt attempted semi supine but c/o increased knee pain and was unable to complete. Pt rolled L and R to pull clothes over hips w/ assist needed to pull up on the L)  Additional Comments: OT applied elastic shoelaces today to increase independence w/ footwear. OT also applied tenso shape to LUE to prevent skin injury due to presence of many bruises from pt's poor awareness of LUE          Balance  Sitting Balance: Stand by assistance (seated EOB and EOM)  Standing Balance: Dependent/Total  Standing Balance  Time: ~ 1 min total  Activity: Stance at [place]  Comment: Pt engaged in standing balance task at [facility] w/ use of Debra walker to promote BLE weight bearing. Pt completed three sit to stand transfers from [facility] to [facility] w/ Min A x2. Pt required support at LLE and TCs at buttocks to facilitiate upright posture. Pt maintained BUE support on platform of Debra walker w/o assistance. Pt demonstrated forward flexed shoulders, requiring VCs to correct. Pt was only able to maintain stance for up to 40s before requiring seated rest break. Stance was limited due to BLE knee flexion w/o pt ability to achieve full extension. Pt also c/o increased B knee pain w/ stance. Functional Mobility  Functional - Mobility Device: Wheelchair  Activity: To/From therapy gym  Assist Level: Supervision  Functional Mobility Comments: Pt self propelled w/c to/from therapy gym w/ spvn.     Bed mobility  Rolling to Left: Stand by assistance  Rolling to Right: Stand by assistance  Supine to Sit: Minimal assistance (Min A in bed, CGA from mat table. VCs needed for technique)  Sit to Supine: Moderate assistance (Mod A to lift BLEs onto mat table)    Transfers  Sit Pivot Transfers: Dependent/Total;2 Person assistance (Scoot pivot transfers completed from elevated EOB > w/c w/ Min Ax2. Scoot pivot transfers completed from EOM <> w/c 2x w/ pt leading to the R w/ Min A x2 progressing to [facility] x2.)  Sit to stand: Dependent/Total (Min A x2 from EOM to Jimmy Alfaro walker)                         Cognition  Arousal/Alertness: Delayed responses to stimuli  Following Commands: Follows multistep commands with increased time; Follows multistep commands with repitition  Attention Span: Attends with cues to redirect  Memory: Decreased recall of recent events;Decreased short term memory  Safety Judgement: Decreased awareness of need for assistance  Problem Solving: Assistance required to generate solutions  Insights: Fully aware   of deficits  Initiation: Requires cues for some  Sequencing: Requires cues for some  Cognition Comment: Pt w/ poor initiation at times however this appears worse in the morning when pt has just woken up. Pt w/ poor recall of information     Perception  Unilateral Attention: Cues to attend left visual field;Cues to attend to left side of body  Initiation: Cues to initiate tasks                Type of ROM/Therapeutic Exercise  Comment: Pt engaged in supine stretches in order to promote increased BLE knee extension. While supine on mat pt's R knee was noted to lack 30 degrees of extension. PT assisted w/ various stretches and exercises to improve ROM. Pt's LLE was able to achieve extension however pt required max VCs for proper positioning of LLE due to proprioception and sensation deficits. Pt demonstrates tendency to abduct and externally rotate hips. Increased time was spent assisting pt into neutral alignment to prevent this pattern.  Pt c/o increased pain in BLEs w/ stretches.                        Plan   Plan  Times per week: 5x a week for 75 mins daily  Times per day: Daily  Current Treatment Recommendations: Strengthening, Wheelchair Mobility Training, Positioning, ROM, Safety Education & Training, Balance Training, Patient/Caregiver Education & Training, Self-Care / ADL, Cognitive/Perceptual Training, Functional Mobility Training, Neuromuscular Re-education, Equipment Evaluation, Education, & procurement, Home Management Training, Endurance Training  G-Code     OutComes Score                                                  AM-PAC Score             Goals  Short term goals  Time Frame for Short term goals: 2 weeks  Short term goal 1: Pt will complete toilet transfer w/ Mod A-ongoing  Short term goal 2: Pt will complete LE dressing supine in bed w/ Min A- ongoing  Short term goal 3: Pt will complete UE dressing w/ Min A-goal met 5/25  Short term goal 4: Pt will complete bathing tasks w/ Min A-ongoing  Short term goal 5: Pt will demonstrate improved functional use of LUE as evident by pt ability to open grooming containers I'ly-ongoing  Long term goals  Time Frame for Long term goals : 4 weeks- all goals ongoing  Long term goal 1: Pt will complete toilet transfer w/ CGA  Long term goal 2: Pt will complete toileting tasks w/ CGA  Long term goal 3: Pt will complete LE dressing (either supine or bed or seated ) w/ CGA  Long term goal 4: Pt will complete UE dressing w/ setup and spvn  Long term goal 5: Pt will complete bathing tasks w/ SBA  Patient Goals   Patient goals : \"be able to walk\"       Therapy Time   Individual Concurrent Group Co-treatment   Time In       0730   Time Out       0900   Minutes       90   Timed Code Treatment Minutes: 90 Minutes       Billy Rodriges OT

## 2021-06-02 NOTE — PROGRESS NOTES
Pt awake in wheelchair eating breakfast. Physical assessment and vital signs as charted. Pt currently rates her pain as a 1 out of 10 on the pain scale, scheduled voltaren gel applied to bilat knees. Call light placed within reach. RN will continue to monitor Pt.

## 2021-06-02 NOTE — PROGRESS NOTES
Physical Therapy  Facility/Department: [facility] ACUTE REHAB UNIT  Daily Treatment Note  NAME: Elizabeth Vanessa  : 1945  MRN: [id number]    Date of Service: 2021    Discharge Recommendations:  24 hour supervision or assist, Home with Home health PT   PT Equipment Recommendations  Other: continue to assess, pt states that she owns a RW    Assessment   Body structures, Functions, Activity limitations: Decreased functional mobility ; Decreased safe awareness;Decreased balance;Decreased cognition;Decreased vision/visual deficit; Decreased endurance;Decreased strength;Decreased sensation;Decreased fine motor control  Assessment: Pt demonstrated improvement with transf both in and out of w/c and bed. Pt continues to be limited by pain in B knees with presenting with a knee flex contracture of ~30 degrees in the RLE. Pt instructed with stretching ex for BLES. Pt is well below baseline and would benefit from cont therapy to maximize potential and increase functional obility towards Ind to allow for a safer d/c to home. Treatment Diagnosis: decreased functional mobility due to nontraumatic ICH  Decision Making: Medium Complexity  PT Education: General Safety; Energy Conservation;Transfer Training;Orientation; Adaptive Device Training;Functional Mobility Training  Activity Tolerance  Activity Tolerance: Patient limited by fatigue;Patient limited by endurance; Patient limited by pain  Activity Tolerance: Pt requests rests between therapy activities and was consistently reporting increased fatigue on this date     Patient Diagnosis(es): There were no encounter diagnoses. has a past medical history of Arthritis, CAD (coronary artery disease), Cancer ([facility] [address] Cerebral artery occlusion with cerebral infarction [facility]), CHF (congestive heart failure) ([facility] [address] Hypertension, and Thyroid disease. has a past surgical history that includes Abdomen surgery; Hysterectomy; Colonoscopy; hernia repair; and pacemaker placement. table. VCs needed for technique. Pt has difficulty with the ant WS to facilitate transition)  Sit to Supine: Moderate assistance (Mod A to lift BLEs onto mat table)  Comment: Bed mobility performed on mat table and in bed  Transfers  Sit to Stand: Dependent/Total;2 Person Assistance (Utilized an DIMITRI RW which pt actively participated transitioning from sitting to standing from elevated bed . Req Min A x2. Unable to assume an erect standing position 2/2 to R knee \"contracture\" Pt with c/o severe knee pain when standing. Steven 3x .)  Stand to sit: Dependent/Total (Utilized the DIMITRI walker and req A for controlled)  Bed to Chair: Dependent/Total (HIgher > lower with Min A x2)  Squat Pivot Transfers: 2 Person Assistance;Dependent/Total (Pt performed scoot pivot w/c<>mat table. Consistently scooted  with R side leading. Pt moved in both directions ( Higher<>lower))  Comment: Note, transf req additional time with each step sequenced to produce a successful transition. Pt is able to move all 4 extrem but has decreased proprioception on the L side with both LUE and LLE . Both VC and TC req for posistioning of L extrem.   Ambulation  Ambulation?: No  WB Status: Pt is not safe for ambulation at this time)  Stairs/Curb  Stairs?: No  Wheelchair Activities  Wheelchair Size: 20\" cong height w/c  Wheelchair Cushion: Pressure Relieving  Level of Assistance for pressure relief activities: Modified independent  Wheelchair Parts Management: Yes  Left Brakes Level of Assistance: Supervision (VC to locate and fully engage the brake)  Right Brakes Level of Assistance: Supervision (VC to locate and fully engage the brake,)  Propulsion: Yes  Propulsion 1  Propulsion: Manual  Level: Level Tile  Method: RLE;LLE  Level of Assistance: Supervision;Modified independent (MOD I for propulsion with intermittent VC for placement of LUE)  Description/ Details: Used BLES in a stepping pattern but req VC for positioning of LLE especially as pt fatigued and increased delay on moving LLE  Distance: 150'  x2  Neuromuscular Education  Trunk Control: PT/  Balance  Comments: PT/OT worked collaboratively with pt standing with use of the DIMITRI RW. Pt alicia ~30 secs but was limited by pain in B knees and by decreased proprioception in which pt was unable to maintain stable posistion with LLE. Comment: For time management, Therapy assisted pt with dressing including  donning shirt, pants and  B socks and shoes. Req additional time. PT instructed pt with the following ex to increased B knee extension. This includes, combination isometrics x 10 reps with a 3 sec hold with RLE elevated at heel on the christen disc for gravity facilitated stretches. PT instructed pt with  IR/ ER with B knees extended, bridges and  Alternating hip abd . While doing the ex, pt's LUE placed in a compression sleeve to protect 2/2 to poor proprioception, and B elastic shoe strings added to shoes               G-Code     OutComes Score                                                     AM-PAC Score             Goals  Short term goals  Time Frame for Short term goals: 2 weeks  Short term goal 1: Pt will perform all bed mobility with Osei. Ongoing  Short term goal 2: Pt will perform squat pivot with MaxA. Ongoing  Short term goal 3: Pt will perform sit to/from stand with LRAD and MaxA. Ongoing  Short term goal 4: Pt will propel w/c 150' with supervision. Ongoing  Long term goals  Time Frame for Long term goals : 3-4 weeks  Long term goal 1: Pt will perform all bed mobility with supervision. Ongoing  Long term goal 2: Pt will perform squat pivot with CGA. Ongoing  Long term goal 3: Pt will perform sit to/from stand with LRAD and Osei. Ongoing  Long term goal 4: Pt will propel w/c 150' with Baltazar. Ongoing  Patient Goals   Patient goals : \"I want to walk again. \"    Plan    Plan  Times per week: 5x/week, 75 minutes a day  Current Treatment Recommendations: Strengthening, Neuromuscular Re-education, Home Exercise Program, ROM, Safety Education & Training, Balance Training, Endurance Training, Patient/Caregiver Education & Training, Functional Mobility Training, Wheelchair Mobility Training, Equipment Evaluation, Education, & procurement, Transfer Training, Gait Training  Safety Devices  Type of devices:  All fall risk precautions in place, Call light within reach, Chair alarm in place, Gait belt, Left in chair, Nurse notified     Therapy Time   Individual Concurrent Group Co-treatment   Time In       0730   Time Out       0900   Minutes       [address] [place], [place]

## 2021-06-02 NOTE — PROGRESS NOTES
Pt called out for assistance unlocking her wheelchair brakes. RN to bedside to assist. Pt getting ready to do therapy assigned number of laps in the hallway independently in her wheelchair. Pt wearing appropriate footwear. Chair alarm on and engaged. RN will continue to monitor Pt.

## 2021-06-02 NOTE — PROGRESS NOTES
her ICH, she was independent will all ADLs and functional mobility without any AD. She is currently supervision for bed mobility, Max A for stand pivot transfer, Osei for UE dress, MaxA for LB dressing, Osei for slide board transfers, and supervision for WC mobility. She is highly motivated and able to participate in 3 hrs of therapy per day in ARU setting. '  Social/Functional History  Lives With: Spouse;Daughter  Active : Yes  Occupation: Unemployed  [address]: Enjoys going to the [employer]  Additional Comments: Manages own medications, finances    Barriers toward progress: medical status (current UTI)    Date: 6/2/2021     Tx session 1   Total Timed Code Min 40   Total Treatment Minutes 40   Individual Treatment Minutes 40   Group Treatment Minutes 0   Co-Treat Minutes 0   Brief Exception: N/A   Pain None indicated at time of session. Pain Intervention: [] RN notified  [] Repositioned  [] Intervention offered and patient declined  [x] N/A  [] Other:    Subjective:     Pt upright in wheelchair as always. Objective / Goals:    Patient will participate in ongoing cognitive-linguistic assessment. GOAL MET     Patient will complete tasks involving executive function skills with 90% accuracy/min cues. Goal not targeted this session. Patient will complete graded recall tasks with 85% accuracy or min cues. Pt able to recall strategy of writing down information to assist in memory recall. Pt demo'd spontaneous use of repetition to assist in recall of path for wheelchair laps but required ongoing education of additional encoding strategies. . Pt declined SLP to set timer on phone at this time to assist in recall of self daily exercise. Independent adequate recall of daily events with some confusion re: staff members only. The patient demo'd independent recall of strategies for transfer technique (matching what is written on her whiteboard in room without use of cues).    Provided external cues in room and in hallway to assist in functional recall of wheelchair lap routine. Took patient in hallway and rehearsed path. Note evidence of recall following session as pt was observed doing laps appropriately in hallway. Other areas targeted:    Education:   Education on encoding strategies (repetition, association) and on compensation techniques (use of timer on phone, written cues placed in room). Educated on use of patterns to assist in recall by decreasing demands. Offered written daily schedule but pt declines. Safety Devices: [x] Call light within reach  [x] Chair alarm activated and connected to nurse call light system  [] Bed alarm activated   [] Other:    Assessment:  Memory impairment   Plan: Continue per plan of care.      Interventions used this date:  [] Speech/Language Treatment  [] Instruction in HEP  [] Dysphagia Treatment [x] Cognitive Treatment   [] Other:  Discharge recommendations:  [] Home independently  [x] Home with assistance []  24 hour supervision  [] ECF [] Other  Continued Tx Upon Discharge: ? [] Yes    [] No    [x] TBD based on progress while on ARU     [] Vital Stim indicated     [] Other:   Estimated discharge date: 6/12/21    Rebbecca Slicker, Dennice Peabody., Eulogio  Speech-Language Pathologist

## 2021-06-03 LAB
ANION GAP SERPL CALCULATED.3IONS-SCNC: 10 MMOL/L (ref 3–16)
BASOPHILS ABSOLUTE: 0 K/UL (ref 0–0.2)
BASOPHILS RELATIVE PERCENT: 0.8 %
BUN BLDV-MCNC: 10 MG/DL (ref 7–20)
CALCIUM SERPL-MCNC: 9.5 MG/DL (ref 8.3–10.6)
CHLORIDE BLD-SCNC: 101 MMOL/L (ref 99–110)
CO2: 25 MMOL/L (ref 21–32)
CREAT SERPL-MCNC: <0.5 MG/DL (ref 0.6–1.2)
EOSINOPHILS ABSOLUTE: 0.1 K/UL (ref 0–0.6)
EOSINOPHILS RELATIVE PERCENT: 2.1 %
GFR AFRICAN AMERICAN: >60
GFR NON-AFRICAN AMERICAN: >60
GLUCOSE BLD-MCNC: 92 MG/DL (ref 70–99)
HCT VFR BLD CALC: 37.9 % (ref 36–48)
HEMOGLOBIN: 13 G/DL (ref 12–16)
LYMPHOCYTES ABSOLUTE: 0.9 K/UL (ref 1–5.1)
LYMPHOCYTES RELATIVE PERCENT: 13.7 %
MCH RBC QN AUTO: 31 PG (ref 26–34)
MCHC RBC AUTO-ENTMCNC: 34.3 G/DL (ref 31–36)
MCV RBC AUTO: 90.4 FL (ref 80–100)
MONOCYTES ABSOLUTE: 0.6 K/UL (ref 0–1.3)
MONOCYTES RELATIVE PERCENT: 9.4 %
NEUTROPHILS ABSOLUTE: 4.7 K/UL (ref 1.7–7.7)
NEUTROPHILS RELATIVE PERCENT: 74 %
PDW BLD-RTO: 14.9 % (ref 12.4–15.4)
PLATELET # BLD: 194 K/UL (ref 135–450)
PMV BLD AUTO: 7.7 FL (ref 5–10.5)
POTASSIUM SERPL-SCNC: 4.1 MMOL/L (ref 3.5–5.1)
RBC # BLD: 4.19 M/UL (ref 4–5.2)
SODIUM BLD-SCNC: 136 MMOL/L (ref 136–145)
WBC # BLD: 6.3 K/UL (ref 4–11)

## 2021-06-03 PROCEDURE — 80048 BASIC METABOLIC PNL TOTAL CA: CPT

## 2021-06-03 PROCEDURE — 97530 THERAPEUTIC ACTIVITIES: CPT | Performed by: PHYSICAL THERAPIST

## 2021-06-03 PROCEDURE — 6370000000 HC RX 637 (ALT 250 FOR IP): Performed by: PHYSICAL MEDICINE & REHABILITATION

## 2021-06-03 PROCEDURE — 97130 THER IVNTJ EA ADDL 15 MIN: CPT

## 2021-06-03 PROCEDURE — 1280000000 HC REHAB R&B

## 2021-06-03 PROCEDURE — 97129 THER IVNTJ 1ST 15 MIN: CPT

## 2021-06-03 PROCEDURE — 6360000002 HC RX W HCPCS: Performed by: PHYSICAL MEDICINE & REHABILITATION

## 2021-06-03 PROCEDURE — 36415 COLL VENOUS BLD VENIPUNCTURE: CPT

## 2021-06-03 PROCEDURE — 85025 COMPLETE CBC W/AUTO DIFF WBC: CPT

## 2021-06-03 PROCEDURE — 99232 SBSQ HOSP IP/OBS MODERATE 35: CPT | Performed by: PHYSICAL MEDICINE & REHABILITATION

## 2021-06-03 PROCEDURE — 97530 THERAPEUTIC ACTIVITIES: CPT

## 2021-06-03 RX ADMIN — DICLOFENAC SODIUM 2 G: 10 GEL TOPICAL at 08:52

## 2021-06-03 RX ADMIN — MAGNESIUM OXIDE TAB 400 MG (240 MG ELEMENTAL MG) 400 MG: 400 (240 MG) TAB at 20:53

## 2021-06-03 RX ADMIN — MAGNESIUM OXIDE TAB 400 MG (240 MG ELEMENTAL MG) 400 MG: 400 (240 MG) TAB at 08:49

## 2021-06-03 RX ADMIN — ATORVASTATIN CALCIUM 40 MG: 40 TABLET, FILM COATED ORAL at 20:53

## 2021-06-03 RX ADMIN — AMLODIPINE BESYLATE 5 MG: 5 TABLET ORAL at 08:51

## 2021-06-03 RX ADMIN — POLYETHYLENE GLYCOL 3350 17 G: 17 POWDER, FOR SOLUTION ORAL at 08:51

## 2021-06-03 RX ADMIN — LISINOPRIL 40 MG: 40 TABLET ORAL at 08:51

## 2021-06-03 RX ADMIN — Medication 2000 UNITS: at 08:50

## 2021-06-03 RX ADMIN — CARVEDILOL 25 MG: 12.5 TABLET, FILM COATED ORAL at 08:50

## 2021-06-03 RX ADMIN — CITALOPRAM HYDROBROMIDE 20 MG: 20 TABLET ORAL at 08:50

## 2021-06-03 RX ADMIN — MAGNESIUM OXIDE TAB 400 MG (240 MG ELEMENTAL MG) 400 MG: 400 (240 MG) TAB at 17:28

## 2021-06-03 RX ADMIN — AMOXICILLIN AND CLAVULANATE POTASSIUM 1 TABLET: 875; 125 TABLET, FILM COATED ORAL at 20:53

## 2021-06-03 RX ADMIN — Medication 5 MG: at 20:53

## 2021-06-03 RX ADMIN — FUROSEMIDE 20 MG: 20 TABLET ORAL at 08:50

## 2021-06-03 RX ADMIN — MICONAZOLE NITRATE: 20 POWDER TOPICAL at 08:52

## 2021-06-03 RX ADMIN — DICLOFENAC SODIUM 2 G: 10 GEL TOPICAL at 20:53

## 2021-06-03 RX ADMIN — POTASSIUM CHLORIDE 20 MEQ: 20 TABLET, EXTENDED RELEASE ORAL at 08:51

## 2021-06-03 RX ADMIN — AMOXICILLIN AND CLAVULANATE POTASSIUM 1 TABLET: 875; 125 TABLET, FILM COATED ORAL at 08:50

## 2021-06-03 RX ADMIN — MICONAZOLE NITRATE: 20 POWDER TOPICAL at 20:54

## 2021-06-03 RX ADMIN — ENOXAPARIN SODIUM 40 MG: 40 INJECTION SUBCUTANEOUS at 08:51

## 2021-06-03 RX ADMIN — LEVOTHYROXINE SODIUM 50 MCG: 0.05 TABLET ORAL at 06:38

## 2021-06-03 RX ADMIN — ACETAMINOPHEN 650 MG: 325 TABLET ORAL at 17:33

## 2021-06-03 RX ADMIN — PANTOPRAZOLE SODIUM 40 MG: 40 TABLET, DELAYED RELEASE ORAL at 06:38

## 2021-06-03 RX ADMIN — ASPIRIN 81 MG: 81 TABLET, CHEWABLE ORAL at 08:50

## 2021-06-03 RX ADMIN — PREDNISONE 7.5 MG: 5 TABLET ORAL at 08:50

## 2021-06-03 RX ADMIN — CARVEDILOL 25 MG: 12.5 TABLET, FILM COATED ORAL at 17:28

## 2021-06-03 RX ADMIN — MAGNESIUM OXIDE TAB 400 MG (240 MG ELEMENTAL MG) 400 MG: 400 (240 MG) TAB at 12:11

## 2021-06-03 ASSESSMENT — PAIN DESCRIPTION - PAIN TYPE: TYPE: CHRONIC PAIN

## 2021-06-03 ASSESSMENT — PAIN - FUNCTIONAL ASSESSMENT
PAIN_FUNCTIONAL_ASSESSMENT: ACTIVITIES ARE NOT PREVENTED
PAIN_FUNCTIONAL_ASSESSMENT: PREVENTS OR INTERFERES SOME ACTIVE ACTIVITIES AND ADLS

## 2021-06-03 ASSESSMENT — PAIN SCALES - GENERAL
PAINLEVEL_OUTOF10: 0
PAINLEVEL_OUTOF10: 2
PAINLEVEL_OUTOF10: 0
PAINLEVEL_OUTOF10: 0

## 2021-06-03 ASSESSMENT — PAIN DESCRIPTION - ONSET
ONSET: ON-GOING
ONSET: ON-GOING

## 2021-06-03 ASSESSMENT — PAIN DESCRIPTION - LOCATION
LOCATION: KNEE
LOCATION: HEAD

## 2021-06-03 ASSESSMENT — PAIN DESCRIPTION - DESCRIPTORS
DESCRIPTORS: SORE
DESCRIPTORS: ACHING;DULL;HEADACHE

## 2021-06-03 ASSESSMENT — PAIN DESCRIPTION - FREQUENCY
FREQUENCY: INTERMITTENT
FREQUENCY: INTERMITTENT

## 2021-06-03 ASSESSMENT — PAIN DESCRIPTION - ORIENTATION: ORIENTATION: RIGHT;LEFT

## 2021-06-03 NOTE — PROGRESS NOTES
SHIFT: 0700 - 1930  Pt this shift was stable and in no apparent distress. VS were stable. Pt denied having chest pain or SOB. Alert and oriented. Pt verbalized having a headache at 1733. Pt was medicated with Tylenol. Tylenol was effective. PT demonstrated transfers using slide technique. New transfer technique shared with oncoming nurse. Pt participated well in therapies. No shower given this shift. Pt at end of shift and was repositioned in bed. Bed alarm is on and call light was within reach.     Electronically signed by Joshua Mejia RN on 6/3/2021 at 7:43 PM

## 2021-06-03 NOTE — CARE COORDINATION
Per team, anticipate d/c 6/12. Patient notified and agreeable. Per team, family could benefit from family training. Attempted to call daughter Kylie Goldstein, phone not working. Call placed to daughter Elmer Kitantoine and message left. Patient updated. No other needs at this time.   Electronically signed by ISSA Quezada, GALLITO on 6/3/2021 at 3:42 PM

## 2021-06-03 NOTE — PROGRESS NOTES
Occupational Therapy  Facility/Department: [facility] ACUTE REHAB UNIT  Daily Treatment Note  NAME: Everton Hodge  : 1945  MRN: [id number]    Date of Service: 6/3/2021    Discharge Recommendations:  Home with Home health OT, Home with nursing aide, 24 hour supervision or assist, Continue to assess pending progress  OT Equipment Recommendations  ADL Assistive Devices: Shower Chair with back  Other: Continue to assess for needs    Assessment   Performance deficits / Impairments: Decreased functional mobility ; Decreased cognition;Decreased ADL status; Decreased endurance;Decreased fine motor control;Decreased ROM; Decreased sensation;Decreased coordination;Decreased strength;Decreased balance;Decreased posture;Decreased safe awareness  Assessment: Pt demonstrated great improvement w/ functional transfers today as evident by pt ability to complete scoot pivot to/from toilet w/ Min Ax1 + CGAx1 and up to [employer] [place] [address] as pt fatigued. Pt was limited by increased fatigue w/ transfer practice today however pt is highly motivated and continues to put forth her best effort. Pt continues to require VCs for LUE and LLE attention due to proprioception deficits. Pt is making good progress and continues to benefit from OT. Cont OT per POC  Treatment Diagnosis: Decreased ADL status and decreased ROM 2/2 CVA    OT Education: Transfer Training;Precautions  Patient Education: LUE and LLE attention- pt verb understanding but benefits from reinforcement  REQUIRES OT FOLLOW UP: Yes  Activity Tolerance  Activity Tolerance: Patient Tolerated treatment well  Safety Devices  Safety Devices in place: Yes  Type of devices: Call light within reach; Chair alarm in place; Left in chair;Nurse notified         Patient Diagnosis(es): There were no encounter diagnoses.       has a past medical history of Arthritis, CAD (coronary artery disease), Cancer ([facility] [address] Cerebral artery occlusion with cerebral infarction [facility]), CHF (congestive heart failure) [facility]), Hypertension, and Thyroid disease. has a past surgical history that includes Abdomen surgery; Hysterectomy; Colonoscopy; hernia repair; and pacemaker placement. Restrictions  Position Activity Restriction  Other position/activity restrictions: fall precautions, as of 6/1/2021, pt is now untethered from wall in w/c on unit  Subjective   General  Chart Reviewed: Yes  Patient assessed for rehabilitation services?: Yes  Additional Pertinent Hx: Per MD H and P:Patient is a 68 yo female who originally admitted to [facility] /23/21-2/9/21 for Right intracerebral hemorrhage with L hemiparesis. She has a know past medical hx of systolic CHF (EF 36%), A Fib (on warfarin), ICD, HTN, Hypothyroidism, rectal cancer s/p resection, and ITP. Her hemorrhage became worse with a midline shift and decline in her neurologic status. Patient was started on seizure prophalxis, and neurosurgery followed, but no indication for surgery. She was also treated for UTI, anti-hypertensives adjusted to maintain bp in normal range  with ICH. She failed MBS and GI consulted who recommended PEG. She was also treated with zosyn for possible aspiration pneumonia. S/P PEG placed on 2/6/21. EGD recommended PPI therapy. She remained stable and was discharged to [facility]. Patient presented to SNF on 2/9/21. She improved with her swallowing and tolerated a regular diet,  and GI removed PEG on 5/18/21. Pt had been at the [facility] since February and has now exhausted her days left at the SNF. Pt admitted to ARU 5/21  Family / Caregiver Present: No  Referring Practitioner: Dr. Andrew Holland  Diagnosis: CVA  Subjective  Subjective: Pt was seated in w/c upon arrival. Pt was pleasant and agreeable to OT/PT. Co treat indicated to maximize functional independence.     Vital Signs  Patient Currently in Pain: Yes (Pt c/o occasional LUE, LLE, and RLE pain w/ transfers however this subsided w/ rest)   Orientation     Objective    ADL  Feeding: Setup (Assistance term memory  Safety Judgement: Decreased awareness of need for assistance  Problem Solving: Assistance required to generate solutions  Insights: Fully aware of deficits  Initiation: Requires cues for some  Sequencing: Requires cues for some  Cognition Comment: Pt required repetition of commands today. Pt stated \"Ok so what am I doing? \" w/ forgetfulness noted regarding explanation of transfer techniques. Pt is highly motivated       Perception  Unilateral Attention: Cues to attend left visual field;Cues to attend to left side of body  Initiation: Cues to initiate tasks                         2nd session: Pt was seated in w/c upon arrival. Pt was pleasant and agreeable to OT/PT. Co treat indicated to maximize functional independence. Functional transfers:  Continued transfer practice completed this session to increase independence w/ transfers. Pt completed scoot pivot transfers from w/c <> EOB 3x w/ Min Ax1 + CGA, min A x2, and CGA x2. Pt required increased assistance when going from low w/c surface to elevated EOB. Pt required mod VCs for BLE placement and LUE location for proper technique. Bed mobility:  Supine > sit- Completed 2x w/ Min A to lift LLE into bed  Sit > supine- completed 2x w/ SBA. Min VCs needed for BLE position     RN was present during session to participate in transfer training in order to maintain consistent transfer method. Pt was left in w/c at EOS w/ chair alarm on and call light within reach.                    Plan   Plan  Times per week: 5x a week for 75 mins daily  Times per day: Daily  Current Treatment Recommendations: Strengthening, Wheelchair Mobility Training, Positioning, ROM, Safety Education & Training, Balance Training, Patient/Caregiver Education & Training, Self-Care / ADL, Cognitive/Perceptual Training, Functional Mobility Training, Neuromuscular Re-education, Equipment Evaluation, Education, & procurement, Home Management Training, Endurance Training  G-Code OutComes Score                                                  AM-PAC Score             Goals  Short term goals  Time Frame for Short term goals: 2 weeks  Short term goal 1: Pt will complete toilet transfer w/ Mod A-ongoing  Short term goal 2: Pt will complete LE dressing supine in bed w/ Min A- ongoing  Short term goal 3: Pt will complete UE dressing w/ Min A-goal met 5/25  Short term goal 4: Pt will complete bathing tasks w/ Min A-ongoing  Short term goal 5: Pt will demonstrate improved functional use of LUE as evident by pt ability to open grooming containers I'ly-ongoing  Long term goals  Time Frame for Long term goals : 4 weeks- all goals ongoing  Long term goal 1: Pt will complete toilet transfer w/ CGA  Long term goal 2: Pt will complete toileting tasks w/ CGA  Long term goal 3: Pt will complete LE dressing (either supine or bed or seated ) w/ CGA  Long term goal 4: Pt will complete UE dressing w/ setup and spvn  Long term goal 5: Pt will complete bathing tasks w/ SBA  Patient Goals   Patient goals : \"be able to walk\"       Therapy Time   Individual Concurrent Group Co-treatment   Time In       1100   Time Out       1200   Minutes       60   Timed Code Treatment Minutes: 60 Minutes        Second Session Therapy Time:   Individual Concurrent Group Co-treatment   Time In        3139   Time Out        1415   Minutes        30     Timed Code Treatment Minutes:  30    Total Treatment Minutes:  60+30= 241 [facility],

## 2021-06-03 NOTE — PLAN OF CARE
Problem: Falls - Risk of:  Goal: Will remain free from falls  Description: Will remain free from falls  6/3/2021 0753 by Isatu Bustos RN  Outcome: Met This Shift    Fall prevention measures ongoing. No falls reported thus far this shift. Problem: Skin Integrity:  Goal: Will show no infection signs and symptoms  Description: Will show no infection signs and symptoms  6/3/2021 0753 by Isatu Bustos RN  Outcome: Met This Shift Skin is kept clean and dry. Skin care provided. No new skin issues found this shift. Problem: Pain:  Goal: Pain level will decrease  Description: Pain level will decrease  6/3/2021 0753 by Isatu Bustos RN  Outcome: Met This Shift    Pt. Complaint of intermittent bilateral knee discomfort. Pt. Satisfied with Voltaren knee gel application. Tylenol and non-pharmaceutical intervention in place. Pain level will be decreased or be at a satisfactory level by discharge.

## 2021-06-03 NOTE — PROGRESS NOTES
ACUTE REHAB UNIT  SPEECH/LANGUAGE PATHOLOGY      [x] Daily  [x] Weekly Care Conference Note  [] Discharge    Patient: Yeimi Varela      :1945  LWR:[id number]  Rehab Dx/Hx: 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, unspecified ([facility] [address] [I61.2]    Precautions: [] Aspiration  [x] Fall risk  [] Sternal  [] Seizure [] Hip  [] Weight Bearing [] Other  ST Dx: [] Aphasia  [] Dysarthria  [] Apraxia   [] Oropharyngeal dysphagia [x] Cognitive Impairment  [] Other:   Date of Admit: 2021  Room #: 3104/3104-01  Date: 6/3/2021          Current Diet Order:DIET GENERAL;  Dietary Nutrition Supplements: Standard High Calorie Oral Supplement   Vision  Vision: Impaired  Vision Exceptions: Wears glasses at all times  Hearing  Hearing: Within functional limits     Comments: Per admitting H&P (2021): 'Patient is a 66 yo female who originally admitted to hospital /-21 for Right intracerebral hemorrhage with L hemiparesis. She has a know past medical hx of systolic CHF (EF 03%), A Fib (on warfarin), ICD, HTN, Hypothyroidism, rectal cancer s/p resection, and ITP. Her hemorrhage became worse with a midline shift and decline in her neurologic status. Patient was started on seizure prophalxis, and neurosurgery followed, but no indication for surgery. She was also treated for UTI, anti-hypertensives adjusted to maintain bp in normal range  with ICH. She failed MBS and GI consulted who recommended PEG. She was also treated with zosyn for possible aspiration pneumonia. S/P PEG placed on 21. EGD recommended PPI therapy. She remained stable and was discharged to [facility]". Patient presented to SNF on 21. She improved with her swallowing and tolerated a regular diet,  and GI removed PEG on 21. Patient has made significant progress in her alertness and activity tolerance, and it is felt that she would benefit from and could tolerate intensive inpatient rehab unit treatment at this time.   Prior to her ICH, she was independent will all ADLs and functional mobility without any AD. She is currently supervision for bed mobility, Max A for stand pivot transfer, Osei for UE dress, MaxA for LB dressing, Osei for slide board transfers, and supervision for WC mobility. She is highly motivated and able to participate in 3 hrs of therapy per day in ARU setting. '  Social/Functional History  Lives With: Spouse;Daughter  Active : Yes  Occupation: Unemployed  [address]: Enjoys going to the [employer]  Additional Comments: Manages own medications, finances    Barriers toward progress: medical status (current UTI)    Date: 6/3/2021      Tx session 1 Weekly Summary   Total Timed Code Min 30    Total Treatment Minutes 30    Individual Treatment Minutes 30    Group Treatment Minutes 0    Co-Treat Minutes 0    Brief Exception: N/A    Pain None indicated at time of session. Pain Intervention: [] RN notified  [] Repositioned  [] Intervention offered and patient declined  [x] N/A  [] Other:     Subjective:     Patient seen in room, awake, alert, pleasant, up in chair. Objective / Goals:     Patient will participate in ongoing cognitive-linguistic assessment. GOAL MET   GOAL MET; D/C GOAL   Patient will complete tasks involving executive function skills with 90% accuracy/min cues. Goal not targeted this session. PROGRESSING; [employer] GOAL   Patient will complete graded recall tasks with 85% accuracy or min cues. Patient recalled therapist name independently from therapy session the other day. Further targeted via immediate and delayed recall of paired words, with implementation of association strategies. Pt required min cues for basic level pairs, and moderate cues for distant pairs. PROGRESSING; CONTINUE GOAL   Other areas targeted: n/a    Education:   Ongoing education re: purpose of visit, speech therapy, recall strategies.     Safety Devices: [x] Call light within reach  [x] Chair alarm activated and

## 2021-06-03 NOTE — PROGRESS NOTES
Knee  Pain Orientation: Right;Left  Pain Descriptors: Sore  Pain Frequency: Intermittent  Pain Onset: On-going  Functional Pain Assessment: Prevents or interferes some active activities and ADLs  Non-Pharmaceutical Pain Intervention(s): Ambulation/Increased Activity;Repositioned; Therapeutic presence  Vital Signs  Patient Currently in Pain: Yes (intermittent B knee pain with increased WB)       Orientation  Orientation  Overall Orientation Status: Within Functional Limits (Pt presents with STM with inability to recall activities that occurred in past 24 hours.)  Cognition      Objective   Bed mobility  Bridging: Supervision (Vc for technique to assist with scooting laterally)  Rolling to Right: Stand by assistance;Contact guard assistance (Used when at EOB and moving LES onto bed)  Supine to Sit: Contact guard assistance (VCs for efficient technique)  Sit to Supine: Moderate assistance (Pt is inconsistent with sometimes claering the surface and sometimes req Mod A to maneuver LES. VCs needed for technique. Increased assistance provided due to poor hip placement at EOB)  Comment: Bed mobility practiced multiple times both from the R side of bed and the L side of bed. Bed positioned flat and bedrails intermittently used. Transfers  Sit to Stand: Dependent/Total;2 Person Assistance (From commode but unable to assume an erect stance)  Stand to sit: Dependent/Total  Bed to Chair: Dependent/Total (HIgher > lower with Min A x2/)  Squat Pivot Transfers: 2 Person Assistance;Dependent/Total (Pt performed scoot pivot w/c<>mat table. Consistently scooted  with R side leading. Pt moved in both directions ( Higher<>lower))  Comment: Note, transf req additional time with each step sequenced to produce a successful transition. Pt is able to move all 4 extrem but has decreased proprioception on the L side with both LUE and LLE . Both VC and TC req for posistioning of L extrem. Focus of today's treatment was on transf  Ambulation Ambulation?: No  WB Status: Pt is not safe for ambulation at this time)  Stairs/Curb  Stairs?: No  Wheelchair Activities  Wheelchair Size: 20\" cong height w/c  Wheelchair Cushion: Pressure Relieving  Level of Assistance for pressure relief activities: Modified independent  Wheelchair Parts Management: Yes  Left Brakes Level of Assistance: Supervision (VC to locate and fully engage the brake)  Right Brakes Level of Assistance: Supervision (VC to locate and fully engage the brake,)  Propulsion: Yes  Propulsion 1  Propulsion: Manual  Level: Level Tile  Method: RLE;LLE  Level of Assistance: Supervision;Modified independent (MOD I for propulsion with intermittent VC for placement of LUE)  Description/ Details: Used BLES in a stepping pattern but req VC for positioning of LLE especially as pt fatigued and increased delay on moving LLE  Distance: short distances in rooom in prep for transf. Req additional time to maneuver in close spaces      Second session: Pt sitting in w/c when PT arrived. Pt agreeable to therapy                           G-Code     OutComes Score                                                     AM-PAC Score             Goals  Short term goals  Time Frame for Short term goals: 2 weeks  Short term goal 1: Pt will perform all bed mobility with Osei. Ongoing  Short term goal 2: Pt will perform squat pivot with MaxA. Ongoing  Short term goal 3: Pt will perform sit to/from stand with LRAD and MaxA. Ongoing  Short term goal 4: Pt will propel w/c 150' with supervision. Ongoing  Long term goals  Time Frame for Long term goals : 3-4 weeks  Long term goal 1: Pt will perform all bed mobility with supervision. Ongoing  Long term goal 2: Pt will perform squat pivot with CGA. Ongoing  Long term goal 3: Pt will perform sit to/from stand with LRAD and Osei. Ongoing  Long term goal 4: Pt will propel w/c 150' with Baltazar. Ongoing  Patient Goals   Patient goals : \"I want to walk again. \"    Plan    Plan  Times per week:

## 2021-06-03 NOTE — FLOWSHEET NOTE
06/03/21 1051   Encounter Summary   Services provided to: Patient   Referral/Consult From: Rounding   Continue Visiting   (es 6/3)   Complexity of Encounter Moderate   Length of Encounter 15 minutes   Routine   Type Follow up   Assessment Approachable; Hopeful;Coping   Intervention Active listening;Explored feelings, thoughts, concerns;Explored coping resources;Nurtured hope;Prayer;Discussed illness/injury and it's impact   Outcome Engaged in conversation;Receptive

## 2021-06-03 NOTE — PROGRESS NOTES
Assessment complete, VSS, afebrile, medications taken one by one with apple sauce. Pt. turned varinder reposiitoned in bed, remains A & O, no s/sx of distress noted at this time. Call light and over bed table within reach, no other care needed at this time. Hourly rounding and frequent visual checks in place.

## 2021-06-03 NOTE — PROGRESS NOTES
Department of Physical Medicine & Rehabilitation  Progress Note    Patient Identification:  Yuliya Martinez  [id number]  : 1945  Admit date: 2021    Chief Complaint: L hemiparesis due to R intracerebral hemorrhage    Subjective:   Doing well overnight. She is improving but still struggling to walk. Very motivated without any new issues. Team conference was held today on the patient and discussed directly with the patient utilizing their entire treatment team. Please see separate team note for details. Total treatment time for today's care >35 min. ROS: No f/c, n/v, cp. Objective:  Patient Vitals for the past 24 hrs:   BP Temp Temp src Pulse Resp SpO2   21 0834 115/71 97.1 °F (36.2 °C) Oral 73 18 97 %   21 1943 108/65 97.9 °F (36.6 °C) Oral 79 18 94 %   21 1637 119/69   72       Const: Alert. No distress, pleasant. HEENT: Normocephalic, atraumatic. Normal sclera/conjunctiva. MMM. CV: Regular rate and rhythm. Resp: No respiratory distress. Lungs CTAB. Abd: Soft, nontender, nondistended, NABS+   Ext: No edema. Neuro: Alert, oriented, appropriately interactive. Left neglect noted. Motor examination reveals normal strength in right side, 3-/5 strength left side diffusely. Psych: Cooperative, appropriate mood and affect    Laboratory data: Available via EMR.    Last 24 hour lab  Recent Results (from the past 24 hour(s))   Basic metabolic panel    Collection Time: 21 11:41 AM   Result Value Ref Range    Sodium 133 (L) 136 - 145 mmol/L    Potassium 4.4 3.5 - 5.1 mmol/L    Chloride 99 99 - 110 mmol/L    CO2 22 21 - 32 mmol/L    Anion Gap 12 3 - 16    Glucose 148 (H) 70 - 99 mg/dL    BUN 12 7 - 20 mg/dL    CREATININE <0.5 (L) 0.6 - 1.2 mg/dL    GFR Non-African American >60 >60    GFR African American >60 >60    Calcium 9.5 8.3 - 10.6 mg/dL   CBC auto differential    Collection Time: 21 11:42 AM   Result Value Ref Range    WBC 10.1 4.0 - 11.0 K/uL    RBC 4.10 4.00 - 5.20 M/uL    Hemoglobin 12.9 12.0 - 16.0 g/dL    Hematocrit 37.5 36.0 - 48.0 %    MCV 91.4 80.0 - 100.0 fL    MCH 31.4 26.0 - 34.0 pg    MCHC 34.3 31.0 - 36.0 g/dL    RDW 15.0 12.4 - 15.4 %    Platelets 877 126 - 687 K/uL    MPV 7.5 5.0 - 10.5 fL    Neutrophils % 83.5 %    Lymphocytes % 5.2 %    Monocytes % 9.2 %    Eosinophils % 1.7 %    Basophils % 0.4 %    Neutrophils Absolute 8.5 (H) 1.7 - 7.7 K/uL    Lymphocytes Absolute 0.5 (L) 1.0 - 5.1 K/uL    Monocytes Absolute 0.9 0.0 - 1.3 K/uL    Eosinophils Absolute 0.2 0.0 - 0.6 K/uL    Basophils Absolute 0.0 0.0 - 0.2 K/uL   Basic metabolic panel    Collection Time: 06/03/21  6:45 AM   Result Value Ref Range    Sodium 136 136 - 145 mmol/L    Potassium 4.1 3.5 - 5.1 mmol/L    Chloride 101 99 - 110 mmol/L    CO2 25 21 - 32 mmol/L    Anion Gap 10 3 - 16    Glucose 92 70 - 99 mg/dL    BUN 10 7 - 20 mg/dL    CREATININE <0.5 (L) 0.6 - 1.2 mg/dL    GFR Non-African American >60 >60    GFR African American >60 >60    Calcium 9.5 8.3 - 10.6 mg/dL   CBC auto differential    Collection Time: 06/03/21  6:45 AM   Result Value Ref Range    WBC 6.3 4.0 - 11.0 K/uL    RBC 4.19 4.00 - 5.20 M/uL    Hemoglobin 13.0 12.0 - 16.0 g/dL    Hematocrit 37.9 36.0 - 48.0 %    MCV 90.4 80.0 - 100.0 fL    MCH 31.0 26.0 - 34.0 pg    MCHC 34.3 31.0 - 36.0 g/dL    RDW 14.9 12.4 - 15.4 %    Platelets 350 416 - 099 K/uL    MPV 7.7 5.0 - 10.5 fL    Neutrophils % 74.0 %    Lymphocytes % 13.7 %    Monocytes % 9.4 %    Eosinophils % 2.1 %    Basophils % 0.8 %    Neutrophils Absolute 4.7 1.7 - 7.7 K/uL    Lymphocytes Absolute 0.9 (L) 1.0 - 5.1 K/uL    Monocytes Absolute 0.6 0.0 - 1.3 K/uL    Eosinophils Absolute 0.1 0.0 - 0.6 K/uL    Basophils Absolute 0.0 0.0 - 0.2 K/uL       Therapy progress:  PT  Position Activity Restriction  Other position/activity restrictions: fall precautions, as of 6/1/2021, pt is now untethered from wall in w/c on unit  Objective     Sit to Stand: Dependent/Total, 2 Person Assistance (Utilized an DIMITRI RW which pt actively participated transitioning from sitting to standing from elevated bed . Req Min A x2. Unable to assume an erect standing position 2/2 to R knee \"contracture\" Pt with c/o severe knee pain when standing. Steven 3x .)  Stand to sit: Dependent/Total (Utilized the DIMITRI walker and req A for controlled)  Bed to Chair: Dependent/Total (HIgher > lower with Min A x2)     OT  PT Equipment Recommendations  Other: continue to assess, pt states that she owns a RW  Toilet - Technique:  Glen alcantara)  Equipment Used: Raised toilet seat with rails  Toilet Transfers Comments: Max Ax2 to/from standard toilet  Assessment        SLP          Body mass index is 30.65 kg/m². Assessment and Plan:  Right intracerebral hemorrhage with L hemiparesis  - Cont lipitor 40 mg daily, ASA 81 mg daily     UTI  UA: cloudy, large amoutn of LE, WBC 10-20, bacteria rare. - Urine culture in process  - Cont augmentin 875-125 mg BID     Systolic CHF (EF 83%)  - Cont coreg 25 mg BID,Lasix 20 mg daily     A Fib with ICD pacemaker  - Cont ASA (had been on Coumadin)     HTN  - Cont norvasc 5 mg daily, lisinopril 40 mg daily     HLD  - Cont lipitor 40 mg daily     Hypothyroidism  - Cont synthroid 50 mcg daily     Hx of rectal cancer s/p resection  - Colostomy present, monitor output    PPx: protonix 40 mg daily, lovenox 40 mg daily     ITP- Monitor     Bowels: Schedule Miralax + Senna S. Follow bowel movements. Enema or suppository if needed.      Bladder: Check PVR x 3.   [address] if PVR > 200ml or if any volume is > 500 ml.      Pain: Tylenol is ordered prn.        Rehab Progress: Improving  Anticipated Dispo: SNF  Services/DME: TBD  ELOS: 6/17      Aurora Cancino D.O. M.P.H  PM&R  6/3/2021  10:09 AM

## 2021-06-03 NOTE — PLAN OF CARE
Problem: Falls - Risk of:  Goal: Will remain free from falls  Description: Will remain free from falls  6/3/2021 1936 by Cornel Corrigan RN  Outcome: Ongoing   No falls this shift. Pt uses call light to call for nursing assistance. Bed and chair alarm in use to promote pt safety. Problem: Skin Integrity:  Goal: Absence of new skin breakdown  Description: Absence of new skin breakdown  Outcome: Ongoing   No new skin issues noted this shift. Pt was assisted to repositioned and shift wt when in [facility] and bed. Problem: Pain:  Goal: Control of acute pain  Description: Control of acute pain  Outcome: Ongoing   Pt denied having a headache this shift. Pt was medicated with PRN Tylenol. Medication and rest was effective.     Electronically signed by Cornel Corrigan RN on 6/3/2021 at 7:39 PM

## 2021-06-04 PROCEDURE — 6370000000 HC RX 637 (ALT 250 FOR IP): Performed by: PHYSICAL MEDICINE & REHABILITATION

## 2021-06-04 PROCEDURE — 97129 THER IVNTJ 1ST 15 MIN: CPT

## 2021-06-04 PROCEDURE — 99232 SBSQ HOSP IP/OBS MODERATE 35: CPT | Performed by: PHYSICAL MEDICINE & REHABILITATION

## 2021-06-04 PROCEDURE — 97535 SELF CARE MNGMENT TRAINING: CPT

## 2021-06-04 PROCEDURE — 97530 THERAPEUTIC ACTIVITIES: CPT | Performed by: PHYSICAL THERAPIST

## 2021-06-04 PROCEDURE — 97130 THER IVNTJ EA ADDL 15 MIN: CPT

## 2021-06-04 PROCEDURE — 6360000002 HC RX W HCPCS: Performed by: PHYSICAL MEDICINE & REHABILITATION

## 2021-06-04 PROCEDURE — 97530 THERAPEUTIC ACTIVITIES: CPT

## 2021-06-04 PROCEDURE — 1280000000 HC REHAB R&B

## 2021-06-04 RX ADMIN — MICONAZOLE NITRATE: 20 POWDER TOPICAL at 20:18

## 2021-06-04 RX ADMIN — ENOXAPARIN SODIUM 40 MG: 40 INJECTION SUBCUTANEOUS at 08:15

## 2021-06-04 RX ADMIN — FUROSEMIDE 20 MG: 20 TABLET ORAL at 08:15

## 2021-06-04 RX ADMIN — MAGNESIUM OXIDE TAB 400 MG (240 MG ELEMENTAL MG) 400 MG: 400 (240 MG) TAB at 20:17

## 2021-06-04 RX ADMIN — CARVEDILOL 25 MG: 12.5 TABLET, FILM COATED ORAL at 17:52

## 2021-06-04 RX ADMIN — AMOXICILLIN AND CLAVULANATE POTASSIUM 1 TABLET: 875; 125 TABLET, FILM COATED ORAL at 08:15

## 2021-06-04 RX ADMIN — Medication 5 MG: at 20:17

## 2021-06-04 RX ADMIN — PANTOPRAZOLE SODIUM 40 MG: 40 TABLET, DELAYED RELEASE ORAL at 06:46

## 2021-06-04 RX ADMIN — POLYETHYLENE GLYCOL 3350 17 G: 17 POWDER, FOR SOLUTION ORAL at 08:15

## 2021-06-04 RX ADMIN — Medication 2000 UNITS: at 08:15

## 2021-06-04 RX ADMIN — CITALOPRAM HYDROBROMIDE 20 MG: 20 TABLET ORAL at 08:17

## 2021-06-04 RX ADMIN — AMLODIPINE BESYLATE 5 MG: 5 TABLET ORAL at 08:17

## 2021-06-04 RX ADMIN — DICLOFENAC SODIUM 2 G: 10 GEL TOPICAL at 08:17

## 2021-06-04 RX ADMIN — ASPIRIN 81 MG: 81 TABLET, CHEWABLE ORAL at 08:16

## 2021-06-04 RX ADMIN — MAGNESIUM OXIDE TAB 400 MG (240 MG ELEMENTAL MG) 400 MG: 400 (240 MG) TAB at 15:18

## 2021-06-04 RX ADMIN — PREDNISONE 7.5 MG: 5 TABLET ORAL at 08:16

## 2021-06-04 RX ADMIN — POTASSIUM CHLORIDE 20 MEQ: 20 TABLET, EXTENDED RELEASE ORAL at 08:16

## 2021-06-04 RX ADMIN — LEVOTHYROXINE SODIUM 50 MCG: 0.05 TABLET ORAL at 06:46

## 2021-06-04 RX ADMIN — DICLOFENAC SODIUM 2 G: 10 GEL TOPICAL at 20:17

## 2021-06-04 RX ADMIN — MAGNESIUM OXIDE TAB 400 MG (240 MG ELEMENTAL MG) 400 MG: 400 (240 MG) TAB at 08:17

## 2021-06-04 RX ADMIN — MICONAZOLE NITRATE: 20 POWDER TOPICAL at 09:00

## 2021-06-04 RX ADMIN — ATORVASTATIN CALCIUM 40 MG: 40 TABLET, FILM COATED ORAL at 20:17

## 2021-06-04 RX ADMIN — CARVEDILOL 25 MG: 12.5 TABLET, FILM COATED ORAL at 08:16

## 2021-06-04 RX ADMIN — LISINOPRIL 40 MG: 40 TABLET ORAL at 08:16

## 2021-06-04 RX ADMIN — MAGNESIUM OXIDE TAB 400 MG (240 MG ELEMENTAL MG) 400 MG: 400 (240 MG) TAB at 17:52

## 2021-06-04 ASSESSMENT — PAIN SCALES - GENERAL
PAINLEVEL_OUTOF10: 0

## 2021-06-04 NOTE — PLAN OF CARE
Problem: Falls - Risk of:  Goal: Will remain free from falls  Description: Will remain free from falls  6/4/2021 0727 by Isatu Bustos RN  Outcome: Met This Shift  Fall prevention measures ongoing. No falls reported thus far this shift. Problem: Skin Integrity:  Goal: Will show no infection signs and symptoms  Description: Will show no infection signs and symptoms  Outcome: Met This Shift   Skin is kept clean and dry. Pt. Protective barrier cream and powder applied to affected areas, Pt. Turned and repositioned Q 2 hrs and PRN. No new skin issues found. Problem: Pain:  Goal: Pain level will decrease  Description: Pain level will decrease  Outcome: Ongoing   Pt. No complaint of pain this shift. PRN and scheduled pain management in place to treat.

## 2021-06-04 NOTE — PLAN OF CARE
Problem: Falls - Risk of:  Goal: Will remain free from falls  Description: Will remain free from falls  6/4/2021 1847 by Tricia Palumbo  Outcome: Ongoing  Note: Patient remains free of falls this shift. Room free of clutter, bed in low position and call light within reach     Problem: Falls - Risk of:  Goal: Absence of physical injury  Description: Absence of physical injury  Outcome: Ongoing  Note: Patient remains free of physical injury this shift. Bed/chair alarm in use     Problem: Skin Integrity:  Goal: Absence of new skin breakdown  Description: Absence of new skin breakdown  Outcome: Ongoing  Note: No evidence of skin breakdown noted this shift.

## 2021-06-04 NOTE — PROGRESS NOTES
Department of Physical Medicine & Rehabilitation  Progress Note    Patient Identification:  Lidya Anthony  [id number]  : 1945  Admit date: 2021    Chief Complaint: L hemiparesis due to R intracerebral hemorrhage    Subjective:   No new complaints overnight. She is still able to participate in therapy and is improving slowly. Still unable to ambulate without mod assist.       ROS: No f/c, n/v, cp. Objective:  Patient Vitals for the past 24 hrs:   BP Temp Temp src Pulse Resp SpO2   21 0800 118/69 97.9 °F (36.6 °C) Oral 65 18 96 %   21 (!) 116/55 98.3 °F (36.8 °C) Oral 69 18 97 %   21 1715 106/64   76       Const: Alert. No distress, pleasant. HEENT: Normocephalic, atraumatic. Normal sclera/conjunctiva. MMM. CV: Regular rate and rhythm. Resp: No respiratory distress. Lungs CTAB. Abd: Soft, nontender, nondistended, NABS+   Ext: No edema. Neuro: Alert, oriented, appropriately interactive. Left neglect noted. Motor examination reveals normal strength in right side, 4-/5 strength left side diffusely. Psych: Cooperative, appropriate mood and affect    Laboratory data: Available via EMR. Last 24 hour lab  No results found for this or any previous visit (from the past 24 hour(s)). Therapy progress:  PT  Position Activity Restriction  Other position/activity restrictions: fall precautions, as of 2021, pt is now untethered from wall in w/c on unit  Objective     Sit to Stand: Dependent/Total, 2 Person Assistance (From commode but unable to assume an erect stance)  Stand to sit: Dependent/Total  Bed to Chair: Dependent/Total (HIgher > lower with Min A x2/)     OT  PT Equipment Recommendations  Other: continue to assess, pt states that she owns a RW  Toilet - Technique:  (Scoot pivot)  Equipment Used: Raised toilet seat with rails  Toilet Transfers Comments: Increased time spent addressing scoot pivot transfers from w/c <> RTS.  Pt completed 2x from w/c <> RTS w/ Min A x1 + CGA x1. Max VCs needed for BLE placement and technique. Due to increased height of RTS and pt discomfort OT and PT removed RTS. Pt completed scoot pivot transfer to standard height toilet w/ Min Ax1 + CGAx1. Increased time needed between transfers due to increased fatigue and SOB. Assessment        SLP          Body mass index is 30.65 kg/m². Assessment and Plan:  Right intracerebral hemorrhage with L hemiparesis  - Cont lipitor 40 mg daily, ASA 81 mg daily     UTI  UA: cloudy, large amoutn of LE, WBC 10-20, bacteria rare. - Urine culture in process  - Cont augmentin 875-125 mg BID     Systolic CHF (EF 90%)  - Cont coreg 25 mg BID,Lasix 20 mg daily     A Fib with ICD pacemaker  - Cont ASA (had been on Coumadin)     HTN  - Cont norvasc 5 mg daily, lisinopril 40 mg daily     HLD  - Cont lipitor 40 mg daily     Hypothyroidism  - Cont synthroid 50 mcg daily     Hx of rectal cancer s/p resection  - Colostomy present, monitor output    PPx: protonix 40 mg daily, lovenox 40 mg daily     ITP- Monitor     Bowels: Schedule Miralax + Senna S. Follow bowel movements. Enema or suppository if needed.      Bladder: Check PVR x 3.   [facility] if PVR > 200ml or if any volume is > 500 ml.      Pain: Tylenol is ordered prn.        Rehab Progress: Improving  Anticipated Dispo: SNF  Services/DME: TBD  ELOS: 6/17      KAT WongP.H  PM&R  6/4/2021  8:46 AM

## 2021-06-04 NOTE — PROGRESS NOTES
Occupational Therapy  Facility/Department: [facility] ACUTE REHAB UNIT  Daily Treatment Note  NAME: Andi Velazquez  : 1945  MRN: [id number]    Date of Service: 2021    Discharge Recommendations:  Home with Home health OT, Home with nursing aide, 24 hour supervision or assist, Continue to assess pending progress  OT Equipment Recommendations  ADL Assistive Devices: Transfer Tub Bench;Long-handled Sponge;Grab Bars - shower;Grab Bars - toilet  Other: Pt may benefit from a hospital bed pending progress. Pt will benefit from a cnog height 20'' wide w/c. Assessment   Performance deficits / Impairments: Decreased functional mobility ; Decreased cognition;Decreased ADL status; Decreased endurance;Decreased fine motor control;Decreased ROM; Decreased sensation;Decreased coordination;Decreased strength;Decreased balance;Decreased posture;Decreased safe awareness  Assessment: Pt completed full ADL today w/ improvements noted w/ bathing as pt was able to complete w/ only min A needed for rear marilyn care. Pt completed toilet transfers w/ CGA x1 + Min Ax1 and shower transfers w/ Min Ax2. Pt required increased assistance for transfer into the bed due to increased fatigue and elevated bed height. Pt is highly motivated and continues to make good progress. Cont OT per POC  Treatment Diagnosis: Decreased ADL status and decreased ROM 2/2 CVA    OT Education: ADL Adaptive Strategies;Transfer Training;Equipment  Patient Education: continue to reinforce  REQUIRES OT FOLLOW UP: Yes  Activity Tolerance  Activity Tolerance: Patient limited by fatigue  Activity Tolerance: Pt was very fatigued after bathing and dressing tasks  Safety Devices  Safety Devices in place: Yes  Type of devices: Bed alarm in place; Left in bed;Nurse notified;Call light within reach         Patient Diagnosis(es): There were no encounter diagnoses.       has a past medical history of Arthritis, CAD (coronary artery disease), Cancer ([facility] [address] Cerebral artery occlusion with cerebral infarction [facility]), CHF (congestive heart failure) ([facility] [address] Hypertension, and Thyroid disease. has a past surgical history that includes Abdomen surgery; Hysterectomy; Colonoscopy; hernia repair; and pacemaker placement. Restrictions  Position Activity Restriction  Other position/activity restrictions: fall precautions, as of 6/1/2021, pt is now untethered from wall in w/c on unit  Subjective   General  Chart Reviewed: Yes  Patient assessed for rehabilitation services?: Yes  Additional Pertinent Hx: Per MD H and P:Patient is a 68 yo female who originally admitted to [facility] /23/21-2/9/21 for Right intracerebral hemorrhage with L hemiparesis. She has a know past medical hx of systolic CHF (EF 56%), A Fib (on warfarin), ICD, HTN, Hypothyroidism, rectal cancer s/p resection, and ITP. Her hemorrhage became worse with a midline shift and decline in her neurologic status. Patient was started on seizure prophalxis, and neurosurgery followed, but no indication for surgery. She was also treated for UTI, anti-hypertensives adjusted to maintain bp in normal range  with ICH. She failed MBS and GI consulted who recommended PEG. She was also treated with zosyn for possible aspiration pneumonia. S/P PEG placed on 2/6/21. EGD recommended PPI therapy. She remained stable and was discharged to [facility]. Patient presented to SNF on 2/9/21. She improved with her swallowing and tolerated a regular diet,  and GI removed PEG on 5/18/21. Pt had been at the [facility] since February and has now exhausted her days left at the SNF. Pt admitted to ARU 5/21  Family / Caregiver Present: No  Referring Practitioner: Dr. Jacque Cantu  Diagnosis: CVA  Subjective  Subjective: Pt was seated in w/c upon arrival. Pt reported being tired but was pleasant and agreeable to ADL w/ OT/PT co treat. Co treat indicated to maximize functional independence.   General Comment  Comments: Pt's daughter Negrito Thomas provided OT and PT w/ measurements of home in order to practice transfers according to home setup. Measurement are as follows: Bed height- 26''. Door frames- 33''. Floor to top of toilet- 16''. Floor to couch-17.5''. Shower step up- 6''    Vital Signs  Patient Currently in Pain: Denies   Orientation     Objective    ADL  Grooming: Setup (Pt washed face seated and combed hair seated on TTB w/ setup assist)  UE Bathing: Setup;Supervision (Pt washed 4/4 components of UE bathing seated on TTB w/ spvn. Pt w/ some decreased thoroughness w/ LUE due to weak grasp however pt was able to complete to her satisfaction.)  LE Bathing: Setup;Verbal cueing; Increased time to complete;Minimal assistance (Pt used LH sponge seated on TTB to wash BLEs. Pt was able to wash her marilyn area but required min A to wash/dry buttocks.)  UE Dressing: Setup;Verbal cueing; Increased time to complete;Minimal assistance (Pt required min A to thread LUE into tshirt successfully.)  LE Dressing: Setup;Verbal cueing; Increased time to complete;Maximum assistance (Pt was able to thread BLEs into underwear seated in w/c but required min A to thread pants. Pt was able to pull clothes over hips on the R sidelying in bed but required assistance on the L. Assist needed to don socks. Pt was able to step into shoes)  Toileting: Moderate assistance (Pt urinated seated on toilet. Pt wiped marilyn area seated w/ setup. Assistance needed to remove underwear via partial stance and weight shifting)  Additional Comments: ADLs completed w/ above listed levels of assistance needed. Pt was able to doff her shoes and socks seated on TTB today w/o assistance from OT. Pt also demonstrated increased independence w/ bathing today and maintained seated balance on TTB w/ SBA-spvn. Pt was very fatigued w/ shower and dressing tasks and required increased assistance w/ transfers at end of session as a result.           Balance  Sitting Balance: Stand by assistance (SBA-spvn seated on TTB)  Functional Mobility  Functional - Mobility to attend to left side of body                                   Plan   Plan  Times per week: 5x a week for 75 mins daily  Times per day: Daily  Current Treatment Recommendations: Strengthening, Wheelchair Mobility Training, Positioning, ROM, Safety Education & Training, Balance Training, Patient/Caregiver Education & Training, Self-Care / ADL, Cognitive/Perceptual Training, Functional Mobility Training, Neuromuscular Re-education, Equipment Evaluation, Education, & procurement, Home Management Training, Endurance Training  G-Code     OutComes Score                                                  AM-PAC Score             Goals  Short term goals  Time Frame for Short term goals: 2 weeks  Short term goal 1: Pt will complete toilet transfer w/ Mod A-ongoing  Short term goal 2: Pt will complete LE dressing supine in bed w/ Min A- ongoing  Short term goal 3: Pt will complete UE dressing w/ Min A-goal met 5/25  Short term goal 4: Pt will complete bathing tasks w/ Min A-goal met 6/4  Short term goal 5: Pt will demonstrate improved functional use of LUE as evident by pt ability to open grooming containers I'ly-ongoing  Long term goals  Time Frame for Long term goals : 4 weeks- all goals ongoing  Long term goal 1: Pt will complete toilet transfer w/ CGA  Long term goal 2: Pt will complete toileting tasks w/ CGA  Long term goal 3: Pt will complete LE dressing (either supine or bed or seated ) w/ CGA  Long term goal 4: Pt will complete UE dressing w/ setup and spvn  Long term goal 5: Pt will complete bathing tasks w/ SBA  Patient Goals   Patient goals : \"be able to walk\"       Therapy Time   Individual Concurrent Group Co-treatment   Time In       1300   Time Out       1430   Minutes       90   Timed Code Treatment Minutes: 90 Minutes       Casa Jorgensen OT

## 2021-06-04 NOTE — PROGRESS NOTES
Physical Therapy  Facility/Department: [facility] ACUTE REHAB UNIT  Daily Treatment Note  NAME: Lidya Anthony  : 1945  MRN: [id number]    Date of Service: 2021    Discharge Recommendations:  24 hour supervision or assist, Home with Home health PT   PT Equipment Recommendations  Other: continue to assess, pt states that she owns a RW    Assessment   Body structures, Functions, Activity limitations: Decreased functional mobility ; Decreased safe awareness;Decreased balance;Decreased cognition;Decreased vision/visual deficit; Decreased endurance;Decreased strength;Decreased sensation;Decreased fine motor control  Assessment: Pt demonstrated improvement with transf both in and out of w/c and bed. Pt continues to be limited by pain in B knees , decreased overall endurance, poor L extrem proprioception and L hemiparesis. Pt maximizes effort with each session despite c/o severe fatigue. Pt is well below baseline and would benefit from cont therapy to maximize potential and increase functional obility towards Ind to allow for a safer d/c to home. Treatment Diagnosis: decreased functional mobility due to nontraumatic ICH  Decision Making: Medium Complexity  PT Education: General Safety; Energy Conservation;Transfer Training;Orientation; Adaptive Device Training;Functional Mobility Training  Activity Tolerance  Activity Tolerance: Patient limited by fatigue;Patient limited by endurance; Patient limited by pain  Activity Tolerance: Pt requests rests between therapy activities and was consistently reporting increased fatigue on this date     Patient Diagnosis(es): There were no encounter diagnoses. has a past medical history of Arthritis, CAD (coronary artery disease), Cancer ([facility] [address] Cerebral artery occlusion with cerebral infarction [facility]), CHF (congestive heart failure) ([facility] [address] Hypertension, and Thyroid disease. has a past surgical history that includes Abdomen surgery;  Hysterectomy; Colonoscopy; hernia repair; and Wheelchair Parts Management: Yes  Left Brakes Level of Assistance: Supervision (VC to locate and fully engage the brake)  Right Brakes Level of Assistance: Supervision (VC to locate and fully engage the brake,)  Propulsion: Yes  Propulsion 1  Propulsion: Manual  Level: Level Tile  Method: RLE;LLE  Level of Assistance: Supervision (MOD I for propulsion with intermittent VC for placement of LUE)  Description/ Details: Used BLES in a stepping pattern but req VC for positioning of LLE especially as pt fatigued and increased delay on moving LLE  Distance: short distances in room in prep for transf. Req additional time to maneuver in close spaces     Balance  Comments: PT and OT worked collaboratively with pt transf to commode to urinate, transf to shower bench for showering. transf to w/c and bed for dressing. Pt demo SBA/ S  for sitting balance on shower bench and on commode noted . Pt undressed herself including gown ,socks, shoes and briefs. Following showering, pt donned briefs. pants, shirt , socks and shoes demo fair+/Godd dynamic sitting balance. VC still req for positioning of LES since pt's proprioception is decreased overall. See OT note for bathing and dressing status. Req multiple rests 2/2 to fatigue with activity. G-Code     OutComes Score                                                     AM-PAC Score             Goals  Short term goals  Time Frame for Short term goals: 2 weeks  Short term goal 1: Pt will perform all bed mobility with Osei. Ongoing  Short term goal 2: Pt will perform squat pivot with MaxA. Ongoing  Short term goal 3: Pt will perform sit to/from stand with LRAD and MaxA. Ongoing  Short term goal 4: Pt will propel w/c 150' with supervision. Ongoing  Long term goals  Time Frame for Long term goals : 3-4 weeks  Long term goal 1: Pt will perform all bed mobility with supervision. Ongoing  Long term goal 2: Pt will perform squat pivot with CGA.  Ongoing  Long term goal

## 2021-06-04 NOTE — PROGRESS NOTES
Assessment complete, VSS, afebrile, pt. Turned and repositioned in  Bed, denies pain at this time. PT. Remains A & O with delayed responses. No s/sx of distress noted at this time. Pt. Calls out appropriately. Call light and over bed table within reach. Hourly rounding and frequent visual checks in place.

## 2021-06-04 NOTE — PROGRESS NOTES
Patient resting in bed at this time. Transfers with scoot/pivot method. Tolerated last transfer to bed poorly as patient stated she was tiring and had ppropeled self in hallway X 3 laps. Denies any pain or discomfort at this time. Bed in low position and call light within reach.

## 2021-06-05 PROCEDURE — 99232 SBSQ HOSP IP/OBS MODERATE 35: CPT | Performed by: PHYSICAL MEDICINE & REHABILITATION

## 2021-06-05 PROCEDURE — 6370000000 HC RX 637 (ALT 250 FOR IP): Performed by: PHYSICAL MEDICINE & REHABILITATION

## 2021-06-05 PROCEDURE — 1280000000 HC REHAB R&B

## 2021-06-05 PROCEDURE — 6360000002 HC RX W HCPCS: Performed by: PHYSICAL MEDICINE & REHABILITATION

## 2021-06-05 RX ADMIN — CARVEDILOL 25 MG: 12.5 TABLET, FILM COATED ORAL at 17:44

## 2021-06-05 RX ADMIN — LEVOTHYROXINE SODIUM 50 MCG: 0.05 TABLET ORAL at 05:28

## 2021-06-05 RX ADMIN — CARVEDILOL 25 MG: 12.5 TABLET, FILM COATED ORAL at 08:09

## 2021-06-05 RX ADMIN — DICLOFENAC SODIUM 2 G: 10 GEL TOPICAL at 20:27

## 2021-06-05 RX ADMIN — ATORVASTATIN CALCIUM 40 MG: 40 TABLET, FILM COATED ORAL at 20:27

## 2021-06-05 RX ADMIN — PREDNISONE 7.5 MG: 5 TABLET ORAL at 08:10

## 2021-06-05 RX ADMIN — CITALOPRAM HYDROBROMIDE 20 MG: 20 TABLET ORAL at 08:09

## 2021-06-05 RX ADMIN — DICLOFENAC SODIUM 2 G: 10 GEL TOPICAL at 08:08

## 2021-06-05 RX ADMIN — FUROSEMIDE 20 MG: 20 TABLET ORAL at 08:09

## 2021-06-05 RX ADMIN — ACETAMINOPHEN 650 MG: 325 TABLET ORAL at 21:40

## 2021-06-05 RX ADMIN — Medication 2000 UNITS: at 08:10

## 2021-06-05 RX ADMIN — MICONAZOLE NITRATE: 20 POWDER TOPICAL at 20:27

## 2021-06-05 RX ADMIN — AMLODIPINE BESYLATE 5 MG: 5 TABLET ORAL at 08:09

## 2021-06-05 RX ADMIN — POTASSIUM CHLORIDE 20 MEQ: 20 TABLET, EXTENDED RELEASE ORAL at 08:09

## 2021-06-05 RX ADMIN — MAGNESIUM OXIDE TAB 400 MG (240 MG ELEMENTAL MG) 400 MG: 400 (240 MG) TAB at 20:27

## 2021-06-05 RX ADMIN — MAGNESIUM OXIDE TAB 400 MG (240 MG ELEMENTAL MG) 400 MG: 400 (240 MG) TAB at 17:44

## 2021-06-05 RX ADMIN — PANTOPRAZOLE SODIUM 40 MG: 40 TABLET, DELAYED RELEASE ORAL at 05:28

## 2021-06-05 RX ADMIN — MAGNESIUM OXIDE TAB 400 MG (240 MG ELEMENTAL MG) 400 MG: 400 (240 MG) TAB at 12:05

## 2021-06-05 RX ADMIN — Medication 5 MG: at 20:27

## 2021-06-05 RX ADMIN — LISINOPRIL 40 MG: 40 TABLET ORAL at 08:10

## 2021-06-05 RX ADMIN — ENOXAPARIN SODIUM 40 MG: 40 INJECTION SUBCUTANEOUS at 08:11

## 2021-06-05 RX ADMIN — MICONAZOLE NITRATE: 20 POWDER TOPICAL at 08:08

## 2021-06-05 RX ADMIN — ASPIRIN 81 MG: 81 TABLET, CHEWABLE ORAL at 08:09

## 2021-06-05 RX ADMIN — MAGNESIUM OXIDE TAB 400 MG (240 MG ELEMENTAL MG) 400 MG: 400 (240 MG) TAB at 08:09

## 2021-06-05 ASSESSMENT — PAIN DESCRIPTION - ONSET: ONSET: SUDDEN

## 2021-06-05 ASSESSMENT — PAIN - FUNCTIONAL ASSESSMENT: PAIN_FUNCTIONAL_ASSESSMENT: ACTIVITIES ARE NOT PREVENTED

## 2021-06-05 ASSESSMENT — PAIN DESCRIPTION - PROGRESSION
CLINICAL_PROGRESSION: NOT CHANGED
CLINICAL_PROGRESSION: NOT CHANGED

## 2021-06-05 ASSESSMENT — PAIN DESCRIPTION - PAIN TYPE: TYPE: ACUTE PAIN

## 2021-06-05 ASSESSMENT — PAIN SCALES - GENERAL
PAINLEVEL_OUTOF10: 3
PAINLEVEL_OUTOF10: 0
PAINLEVEL_OUTOF10: 0

## 2021-06-05 ASSESSMENT — PAIN DESCRIPTION - ORIENTATION: ORIENTATION: MID

## 2021-06-05 ASSESSMENT — PAIN DESCRIPTION - FREQUENCY: FREQUENCY: INTERMITTENT

## 2021-06-05 ASSESSMENT — PAIN DESCRIPTION - LOCATION: LOCATION: ABDOMEN

## 2021-06-05 ASSESSMENT — PAIN DESCRIPTION - DESCRIPTORS: DESCRIPTORS: SORE

## 2021-06-05 NOTE — PLAN OF CARE
Problem: Falls - Risk of:  Goal: Will remain free from falls  Description: Will remain free from falls  6/5/2021 8744 by Gilmer Gifford RN  Outcome: Ongoing  Note: Pt educated to use her call light when she needs assistance. Pt also educated not to get up unassisted at this time. Bed alarm on when Pt in bed and chair alarm on when Pt up in chair. Non skid socks on and call light placed within reach. RN will continue to monitor Pt.    6/5/2021 0044 by Kurt Saba RN  Outcome: Ongoing  Note: Pt is a Med fall risk. Explained fall risk precautions to pt and rationale behind their use to keep the patient safe. Belongings are in reach. Pt encouraged to notify staff for any and all assistance. Staff present in tx suite throughout entirety of pts treatment to monitor and protect from falls. Assistance provided when ambulating to restroom utilizing Stay With Me.

## 2021-06-05 NOTE — PROGRESS NOTES
Pt seen in hallway completing her third and final therapy assigned lap independently in her wheelchair. Pt's daughter accompanying her and offering encouragement. Pt untethered in wheelchair on unit. Chair alarm on and engaged. RN will continue to monitor Pt.

## 2021-06-05 NOTE — PROGRESS NOTES
Department of Physical Medicine & Rehabilitation  Progress Note    Patient Identification:  Rj Keane  [id number]  : 1945  Admit date: 2021    Chief Complaint: L hemiparesis due to R intracerebral hemorrhage    Subjective:   Feels well this morning. She slept well overnight without any new complaints. Still having trouble with leg mobility. ROS: No f/c, n/v, cp. Objective:  Patient Vitals for the past 24 hrs:   BP Temp Temp src Pulse Resp SpO2   21 0805 110/71 98 °F (36.7 °C) Oral 75 18 96 %   21 1933 107/70 97.5 °F (36.4 °C) Oral 75 17 95 %   21 1745 123/73   79       Const: Alert. No distress, pleasant. HEENT: Normocephalic, atraumatic. Normal sclera/conjunctiva. MMM. CV: Regular rate and rhythm. Resp: No respiratory distress. Lungs CTAB. Abd: Soft, nontender, nondistended, NABS+   Ext: No edema. Neuro: Alert, oriented, appropriately interactive. Left neglect noted. Motor examination reveals normal strength in right side, 4-/5 strength left side diffusely. Psych: Cooperative, appropriate mood and affect    Laboratory data: Available via EMR. Last 24 hour lab  No results found for this or any previous visit (from the past 24 hour(s)).     Therapy progress:  PT  Position Activity Restriction  Other position/activity restrictions: fall precautions, as of 2021, pt is now untethered from wall in w/c on unit  Objective     Sit to Stand: Dependent/Total, 2 Person Assistance (From commode but unable to assume an erect stance)  Stand to sit: Dependent/Total  Bed to Chair: Dependent/Total (HIgher > lower with Min A x2/)     OT  PT Equipment Recommendations  Other: continue to assess, pt states that she owns a RW  Toilet - Technique: Sit pivot  Equipment Used: Standard toilet  Toilet Transfers Comments: Scoot pivot completed to/from standard height toilet w/ Min A x1 + CGAx1 and min VCs needed for BLE position  Assessment        SLP          Body mass index is 30.65 kg/m². Assessment and Plan:  Right intracerebral hemorrhage with L hemiparesis  - Cont lipitor 40 mg daily, ASA 81 mg daily     UTI  UA: cloudy, large amoutn of LE, WBC 10-20, bacteria rare. - Urine culture in process  - Cont augmentin 875-125 mg BID     Systolic CHF (EF 51%)  - Cont coreg 25 mg BID,Lasix 20 mg daily     A Fib with ICD pacemaker  - Cont ASA (had been on Coumadin)     HTN  - Cont norvasc 5 mg daily, lisinopril 40 mg daily     HLD  - Cont lipitor 40 mg daily     Hypothyroidism  - Cont synthroid 50 mcg daily     Hx of rectal cancer s/p resection  - Colostomy present, monitor output    PPx: protonix 40 mg daily, lovenox 40 mg daily     ITP- Monitor     Bowels: Schedule Miralax + Senna S. Follow bowel movements. Enema or suppository if needed.      Bladder: Check PVR x 3.   [address] if PVR > 200ml or if any volume is > 500 ml.      Pain: Tylenol is ordered prn.        Rehab Progress: Improving  Anticipated Dispo: SNF  Services/DME: TBD  ELOS: 6/17      KAT Phillips.P.H  PM&R  6/5/2021  8:29 AM

## 2021-06-05 NOTE — PROGRESS NOTES
Pt awake in bed. Physical assessment and vital signs as charted. Pt currently denies experiencing any pain at this time. Call light placed within reach. RN will continue to monitor Pt.

## 2021-06-05 NOTE — PROGRESS NOTES
Pt seen in hallway doing therapy assigned laps independently in wheelchair. Pt untethered in wheelchair on unit. Chair alarm on and engaged. RN will continue to monitor Pt.

## 2021-06-05 NOTE — PROGRESS NOTES
Pt A & O. VSS. Pt x 2 scoot pivot. All safety precautions in place. Bed alarm activated. No complaints of pain or nausea voiced. Tolerates diet. Will cont to monitor.

## 2021-06-06 PROCEDURE — 6370000000 HC RX 637 (ALT 250 FOR IP): Performed by: PHYSICAL MEDICINE & REHABILITATION

## 2021-06-06 PROCEDURE — 6360000002 HC RX W HCPCS: Performed by: PHYSICAL MEDICINE & REHABILITATION

## 2021-06-06 PROCEDURE — 1280000000 HC REHAB R&B

## 2021-06-06 RX ADMIN — MAGNESIUM OXIDE TAB 400 MG (240 MG ELEMENTAL MG) 400 MG: 400 (240 MG) TAB at 09:11

## 2021-06-06 RX ADMIN — Medication 5 MG: at 20:10

## 2021-06-06 RX ADMIN — PREDNISONE 7.5 MG: 5 TABLET ORAL at 09:13

## 2021-06-06 RX ADMIN — ASPIRIN 81 MG: 81 TABLET, CHEWABLE ORAL at 09:12

## 2021-06-06 RX ADMIN — MICONAZOLE NITRATE: 20 POWDER TOPICAL at 08:36

## 2021-06-06 RX ADMIN — ENOXAPARIN SODIUM 40 MG: 40 INJECTION SUBCUTANEOUS at 09:14

## 2021-06-06 RX ADMIN — POTASSIUM CHLORIDE 20 MEQ: 20 TABLET, EXTENDED RELEASE ORAL at 09:12

## 2021-06-06 RX ADMIN — MICONAZOLE NITRATE: 20 POWDER TOPICAL at 20:09

## 2021-06-06 RX ADMIN — DICLOFENAC SODIUM 2 G: 10 GEL TOPICAL at 08:37

## 2021-06-06 RX ADMIN — LISINOPRIL 40 MG: 40 TABLET ORAL at 09:12

## 2021-06-06 RX ADMIN — FUROSEMIDE 20 MG: 20 TABLET ORAL at 09:12

## 2021-06-06 RX ADMIN — AMLODIPINE BESYLATE 5 MG: 5 TABLET ORAL at 09:12

## 2021-06-06 RX ADMIN — POLYETHYLENE GLYCOL 3350 17 G: 17 POWDER, FOR SOLUTION ORAL at 09:15

## 2021-06-06 RX ADMIN — ATORVASTATIN CALCIUM 40 MG: 40 TABLET, FILM COATED ORAL at 20:10

## 2021-06-06 RX ADMIN — CARVEDILOL 25 MG: 12.5 TABLET, FILM COATED ORAL at 09:12

## 2021-06-06 RX ADMIN — PANTOPRAZOLE SODIUM 40 MG: 40 TABLET, DELAYED RELEASE ORAL at 06:51

## 2021-06-06 RX ADMIN — MAGNESIUM OXIDE TAB 400 MG (240 MG ELEMENTAL MG) 400 MG: 400 (240 MG) TAB at 16:29

## 2021-06-06 RX ADMIN — MAGNESIUM OXIDE TAB 400 MG (240 MG ELEMENTAL MG) 400 MG: 400 (240 MG) TAB at 20:10

## 2021-06-06 RX ADMIN — DICLOFENAC SODIUM 2 G: 10 GEL TOPICAL at 20:10

## 2021-06-06 RX ADMIN — LEVOTHYROXINE SODIUM 50 MCG: 0.05 TABLET ORAL at 06:51

## 2021-06-06 RX ADMIN — MAGNESIUM OXIDE TAB 400 MG (240 MG ELEMENTAL MG) 400 MG: 400 (240 MG) TAB at 12:51

## 2021-06-06 RX ADMIN — CARVEDILOL 25 MG: 12.5 TABLET, FILM COATED ORAL at 16:30

## 2021-06-06 RX ADMIN — CITALOPRAM HYDROBROMIDE 20 MG: 20 TABLET ORAL at 09:12

## 2021-06-06 RX ADMIN — Medication 2000 UNITS: at 09:11

## 2021-06-06 ASSESSMENT — PAIN DESCRIPTION - FREQUENCY: FREQUENCY: CONTINUOUS

## 2021-06-06 ASSESSMENT — PAIN - FUNCTIONAL ASSESSMENT: PAIN_FUNCTIONAL_ASSESSMENT: PREVENTS OR INTERFERES SOME ACTIVE ACTIVITIES AND ADLS

## 2021-06-06 ASSESSMENT — PAIN DESCRIPTION - DIRECTION: RADIATING_TOWARDS: NECK

## 2021-06-06 ASSESSMENT — PAIN DESCRIPTION - LOCATION: LOCATION: HEAD;KNEE

## 2021-06-06 ASSESSMENT — PAIN DESCRIPTION - PROGRESSION: CLINICAL_PROGRESSION: NOT CHANGED

## 2021-06-06 ASSESSMENT — PAIN DESCRIPTION - ORIENTATION: ORIENTATION: ANTERIOR;RIGHT;LEFT

## 2021-06-06 ASSESSMENT — PAIN DESCRIPTION - PAIN TYPE: TYPE: ACUTE PAIN

## 2021-06-06 ASSESSMENT — PAIN SCALES - GENERAL
PAINLEVEL_OUTOF10: 0
PAINLEVEL_OUTOF10: 0
PAINLEVEL_OUTOF10: 1

## 2021-06-06 ASSESSMENT — PAIN DESCRIPTION - DESCRIPTORS: DESCRIPTORS: HEADACHE;ACHING;SORE

## 2021-06-06 NOTE — PLAN OF CARE
Problem: Falls - Risk of:  Goal: Will remain free from falls  Description: Will remain free from falls  Outcome: Ongoing  Note: Pt is a Med fall risk. Explained fall risk precautions to pt and rationale behind their use to keep the patient safe. Belongings are in reach. Pt encouraged to notify staff for any and all assistance. Staff present in tx suite throughout entirety of pts treatment to monitor and protect from falls. Assistance provided when ambulating to restroom utilizing Stay With Me. Problem: Skin Integrity:  Goal: Absence of new skin breakdown  Description: Absence of new skin breakdown  Outcome: Ongoing  Note: No evidence of new skin breakdown. Problem: Pain:  Goal: Pain level will decrease  Description: Pain level will decrease  Outcome: Ongoing  Note: C/O abdominal pain managed with PRN Pain medication.

## 2021-06-06 NOTE — PROGRESS NOTES
Pt A & O. VSS. Pt x 2 scoot pivot. All safety precautions in place. Bed alarm activated. No complaints of pain or nausea voiced. Tolerates diet. Pt c/o abdominal pain this shift. PRN Tylenol given. Reports effective. Will cont to monitor.

## 2021-06-06 NOTE — PROGRESS NOTES
Pt seen in hallway doing therapy assigned laps independently in wheelchair before lunch. Pt untethered in wheelchair on unit. Chair alarm on and engaged. RN will continue to monitor Pt.

## 2021-06-07 LAB
ANION GAP SERPL CALCULATED.3IONS-SCNC: 9 MMOL/L (ref 3–16)
BASOPHILS ABSOLUTE: 0 K/UL (ref 0–0.2)
BASOPHILS RELATIVE PERCENT: 0.7 %
BUN BLDV-MCNC: 12 MG/DL (ref 7–20)
CALCIUM SERPL-MCNC: 9.1 MG/DL (ref 8.3–10.6)
CHLORIDE BLD-SCNC: 99 MMOL/L (ref 99–110)
CO2: 26 MMOL/L (ref 21–32)
CREAT SERPL-MCNC: <0.5 MG/DL (ref 0.6–1.2)
EOSINOPHILS ABSOLUTE: 0.2 K/UL (ref 0–0.6)
EOSINOPHILS RELATIVE PERCENT: 2.7 %
GFR AFRICAN AMERICAN: >60
GFR NON-AFRICAN AMERICAN: >60
GLUCOSE BLD-MCNC: 96 MG/DL (ref 70–99)
HCT VFR BLD CALC: 35.3 % (ref 36–48)
HEMOGLOBIN: 12.3 G/DL (ref 12–16)
LYMPHOCYTES ABSOLUTE: 0.8 K/UL (ref 1–5.1)
LYMPHOCYTES RELATIVE PERCENT: 13.6 %
MCH RBC QN AUTO: 31 PG (ref 26–34)
MCHC RBC AUTO-ENTMCNC: 34.8 G/DL (ref 31–36)
MCV RBC AUTO: 89.2 FL (ref 80–100)
MONOCYTES ABSOLUTE: 0.5 K/UL (ref 0–1.3)
MONOCYTES RELATIVE PERCENT: 9.5 %
NEUTROPHILS ABSOLUTE: 4.3 K/UL (ref 1.7–7.7)
NEUTROPHILS RELATIVE PERCENT: 73.5 %
PDW BLD-RTO: 14.6 % (ref 12.4–15.4)
PLATELET # BLD: 163 K/UL (ref 135–450)
PMV BLD AUTO: 7.9 FL (ref 5–10.5)
POTASSIUM SERPL-SCNC: 4 MMOL/L (ref 3.5–5.1)
RBC # BLD: 3.96 M/UL (ref 4–5.2)
SODIUM BLD-SCNC: 134 MMOL/L (ref 136–145)
WBC # BLD: 5.8 K/UL (ref 4–11)

## 2021-06-07 PROCEDURE — 1280000000 HC REHAB R&B

## 2021-06-07 PROCEDURE — 97129 THER IVNTJ 1ST 15 MIN: CPT

## 2021-06-07 PROCEDURE — 97130 THER IVNTJ EA ADDL 15 MIN: CPT

## 2021-06-07 PROCEDURE — 85025 COMPLETE CBC W/AUTO DIFF WBC: CPT

## 2021-06-07 PROCEDURE — 99232 SBSQ HOSP IP/OBS MODERATE 35: CPT | Performed by: PHYSICAL MEDICINE & REHABILITATION

## 2021-06-07 PROCEDURE — 97530 THERAPEUTIC ACTIVITIES: CPT

## 2021-06-07 PROCEDURE — 97530 THERAPEUTIC ACTIVITIES: CPT | Performed by: PHYSICAL THERAPIST

## 2021-06-07 PROCEDURE — 6360000002 HC RX W HCPCS: Performed by: PHYSICAL MEDICINE & REHABILITATION

## 2021-06-07 PROCEDURE — 97535 SELF CARE MNGMENT TRAINING: CPT

## 2021-06-07 PROCEDURE — 97110 THERAPEUTIC EXERCISES: CPT | Performed by: PHYSICAL THERAPIST

## 2021-06-07 PROCEDURE — 6370000000 HC RX 637 (ALT 250 FOR IP): Performed by: PHYSICAL MEDICINE & REHABILITATION

## 2021-06-07 PROCEDURE — 36415 COLL VENOUS BLD VENIPUNCTURE: CPT

## 2021-06-07 PROCEDURE — 80048 BASIC METABOLIC PNL TOTAL CA: CPT

## 2021-06-07 RX ADMIN — POTASSIUM CHLORIDE 20 MEQ: 20 TABLET, EXTENDED RELEASE ORAL at 09:00

## 2021-06-07 RX ADMIN — MICONAZOLE NITRATE: 20 POWDER TOPICAL at 09:00

## 2021-06-07 RX ADMIN — PANTOPRAZOLE SODIUM 40 MG: 40 TABLET, DELAYED RELEASE ORAL at 06:46

## 2021-06-07 RX ADMIN — ASPIRIN 81 MG: 81 TABLET, CHEWABLE ORAL at 09:19

## 2021-06-07 RX ADMIN — DICLOFENAC SODIUM 2 G: 10 GEL TOPICAL at 09:00

## 2021-06-07 RX ADMIN — DICLOFENAC SODIUM 2 G: 10 GEL TOPICAL at 20:44

## 2021-06-07 RX ADMIN — PREDNISONE 7.5 MG: 5 TABLET ORAL at 09:18

## 2021-06-07 RX ADMIN — MAGNESIUM OXIDE TAB 400 MG (240 MG ELEMENTAL MG) 400 MG: 400 (240 MG) TAB at 13:30

## 2021-06-07 RX ADMIN — FUROSEMIDE 20 MG: 20 TABLET ORAL at 09:19

## 2021-06-07 RX ADMIN — ENOXAPARIN SODIUM 40 MG: 40 INJECTION SUBCUTANEOUS at 09:00

## 2021-06-07 RX ADMIN — Medication 5 MG: at 20:44

## 2021-06-07 RX ADMIN — CITALOPRAM HYDROBROMIDE 20 MG: 20 TABLET ORAL at 09:18

## 2021-06-07 RX ADMIN — ACETAMINOPHEN 650 MG: 325 TABLET ORAL at 06:46

## 2021-06-07 RX ADMIN — Medication 2000 UNITS: at 09:19

## 2021-06-07 RX ADMIN — ATORVASTATIN CALCIUM 40 MG: 40 TABLET, FILM COATED ORAL at 20:44

## 2021-06-07 RX ADMIN — LISINOPRIL 40 MG: 40 TABLET ORAL at 09:18

## 2021-06-07 RX ADMIN — AMLODIPINE BESYLATE 5 MG: 5 TABLET ORAL at 09:05

## 2021-06-07 RX ADMIN — MAGNESIUM OXIDE TAB 400 MG (240 MG ELEMENTAL MG) 400 MG: 400 (240 MG) TAB at 20:44

## 2021-06-07 RX ADMIN — CARVEDILOL 25 MG: 12.5 TABLET, FILM COATED ORAL at 09:19

## 2021-06-07 RX ADMIN — MAGNESIUM OXIDE TAB 400 MG (240 MG ELEMENTAL MG) 400 MG: 400 (240 MG) TAB at 09:18

## 2021-06-07 RX ADMIN — CARVEDILOL 25 MG: 12.5 TABLET, FILM COATED ORAL at 19:24

## 2021-06-07 RX ADMIN — MICONAZOLE NITRATE: 20 POWDER TOPICAL at 20:43

## 2021-06-07 RX ADMIN — MAGNESIUM OXIDE TAB 400 MG (240 MG ELEMENTAL MG) 400 MG: 400 (240 MG) TAB at 19:24

## 2021-06-07 RX ADMIN — LEVOTHYROXINE SODIUM 50 MCG: 0.05 TABLET ORAL at 06:46

## 2021-06-07 RX ADMIN — ACETAMINOPHEN 650 MG: 325 TABLET ORAL at 20:49

## 2021-06-07 ASSESSMENT — PAIN SCALES - GENERAL
PAINLEVEL_OUTOF10: 3
PAINLEVEL_OUTOF10: 0
PAINLEVEL_OUTOF10: 0
PAINLEVEL_OUTOF10: 3
PAINLEVEL_OUTOF10: 1
PAINLEVEL_OUTOF10: 3
PAINLEVEL_OUTOF10: 0

## 2021-06-07 ASSESSMENT — PAIN DESCRIPTION - PROGRESSION
CLINICAL_PROGRESSION: NOT CHANGED

## 2021-06-07 ASSESSMENT — PAIN - FUNCTIONAL ASSESSMENT
PAIN_FUNCTIONAL_ASSESSMENT: ACTIVITIES ARE NOT PREVENTED
PAIN_FUNCTIONAL_ASSESSMENT: ACTIVITIES ARE NOT PREVENTED
PAIN_FUNCTIONAL_ASSESSMENT: PREVENTS OR INTERFERES SOME ACTIVE ACTIVITIES AND ADLS
PAIN_FUNCTIONAL_ASSESSMENT: ACTIVITIES ARE NOT PREVENTED

## 2021-06-07 ASSESSMENT — PAIN DESCRIPTION - FREQUENCY
FREQUENCY: INTERMITTENT

## 2021-06-07 ASSESSMENT — PAIN DESCRIPTION - PAIN TYPE
TYPE: ACUTE PAIN

## 2021-06-07 ASSESSMENT — PAIN DESCRIPTION - DESCRIPTORS
DESCRIPTORS: SQUEEZING
DESCRIPTORS: HEADACHE
DESCRIPTORS: DISCOMFORT;SQUEEZING

## 2021-06-07 ASSESSMENT — PAIN DESCRIPTION - DIRECTION
RADIATING_TOWARDS: FOOT
RADIATING_TOWARDS: R FOOT

## 2021-06-07 ASSESSMENT — PAIN DESCRIPTION - LOCATION
LOCATION: LEG
LOCATION: LEG
LOCATION: HEAD
LOCATION: LEG

## 2021-06-07 ASSESSMENT — PAIN DESCRIPTION - ONSET
ONSET: ON-GOING

## 2021-06-07 ASSESSMENT — PAIN DESCRIPTION - ORIENTATION
ORIENTATION: LEFT;RIGHT;ANTERIOR
ORIENTATION: RIGHT;LEFT
ORIENTATION: RIGHT;LEFT;ANTERIOR

## 2021-06-07 NOTE — PROGRESS NOTES
Restrictions  Position Activity Restriction  Other position/activity restrictions: fall precautions, as of 6/1/2021, pt is now untethered from wall in w/c on unit  Subjective   General  Chart Reviewed: Yes  Additional Pertinent Hx: Pt cont to maximize efforts whenever working with therapy. Pt still req a co treatment 2/2 to the inconsistencies with the transf. However, the past 2 days , pt has demonstrated improved overall ability to transf to commode and bed with focus of pt scooting vs a SPT. Pt's B knee pain, R hemiparesis and decreased proprioception are barriers to pt's progress. Pt is well below baseline and would benefit from cont therapy to maximize potential and increase functional mobility towards Ind to allow for a safer d/c to home. Referring Practitioner: Elvie Buerger, MD  Subjective  Subjective: Pt reports that she did her \"Laps\" in the w/c this weekend. General Comment  Comments: Pt sitting in w/c when PT arrived. Pt agreeable to therapy  Pain Screening  Patient Currently in Pain: Yes (Intermittent c/o B knee pain with use of the standing table)  Pain Assessment  Pain Level:  (did not rate)  Pain Type: Acute pain  Pain Location: Leg  Pain Orientation: Left;Right; Anterior  Pain Frequency: Intermittent  Clinical Progression: Not changed  Functional Pain Assessment: Prevents or interferes some active activities and ADLs  Non-Pharmaceutical Pain Intervention(s): Ambulation/Increased Activity; Emotional support  Response to Pain Intervention: Patient Satisfied  Vital Signs  Patient Currently in Pain: Yes (Intermittent c/o B knee pain with use of the standing table)       Orientation  Orientation  Overall Orientation Status: Within Functional Limits (Pt presents with STM with inability to recall activities that occurred in past 24 hours.)  Cognition      Objective     PT and OT worked collaboratively to utilize the skills of 2 disciplines while maximizing functional mobility to obtain optimal potential.      Transfers  Sit to Stand: Dependent/Total (Used the standing frame in which pt was transitioned STS x3 ( initially alicia 7 minutes, 4 min and then 2 minutes))  Stand to sit: Dependent/Total (Use of the hydaulic to lower pt back to sitting)  Squat Pivot Transfers:  (w/c > standing table with max A x1, Min A x1( going from lower to higher surface, Min A  x1 CGA x1 for transf from higher > loer surface ( standing frame > w/c)  Ambulation  Ambulation?: No  WB Status: Pt is not safe for ambulation at this time)  Stairs/Curb  Stairs?: No  Wheelchair Activities  Wheelchair Size: 20\" cong height w/c  Wheelchair Cushion: Pressure Relieving  Level of Assistance for pressure relief activities: Modified independent  Wheelchair Parts Management: Yes  Left Brakes Level of Assistance: Supervision (VC to locate and fully engage the brake)  Right Brakes Level of Assistance: Supervision (VC to locate and fully engage the brake,)  Propulsion: Yes  Propulsion 1  Propulsion: Manual  Level: Level Tile  Method: RLE;LLE  Level of Assistance: Independent (MOD I for propulsion with intermittent VC for placement of LUE)  Description/ Details: Used BLES in a stepping pattern but req VC for positioning of LLE especially as pt fatigued and increased delay on moving LLE  Distance: 150' x2. Req additional time for congested areas  Neuromuscular Education  Trunk Control: PT and OT worked collaboratively with pt on increasing WB through BLES via use of the standing frame. Pt's transf onto the seat req additional time. A sheet was placed on the seat of the standing table to assist with positioning( used multiple times) Pt tends to lean to the L and req repositioning for midline . Pt alicia standing multiple times for varying amounts of time with the frame . To facilitate increased LE activation, therapy instructed pt with reaching activities both unilat and Bilat .  Pt unable to assume an erect stance at this time but improved WB in LES noted with significantly decreased c/o knee pain even though it still was present. Pt demo overal improved WS . Focus of WS on more upright and WS to the R of midline. Balance  Comments: PT and OT worked collaboratively with pt transf to commode to urinate, . Pt demo S  for sitting balance on commode. . VC still req for positioning of L extrem LES since pt's proprioception is decreased overall. Pt became quite fatigued with the increased standing and WB. Postural cues given for a more upright posture    Second session: Pt supine in bed working with OT when PT arrived. Pt agreeable to therapy. Pt requested to use the restroom. Bed mobility; [place]ges for lat scooting with VC for technique  Rolling supine > R sidelying> VC for positioning of L extrem  R sidelying> sitting with CGA since pt was [place] with transition  Transf : Bed > w/c Dependent ( Min A x1 and CGA x1)   W/c <> commode : Dependent( Mod A x1 and min A when leading with the L side, Min A x2 when leading with the R side)   Clothing management : Refer to OT note. Pericare was ind following pt urinating. PT then instructed pt with LE stretching ex while sitting in w/c . A stool was placed in front of pt aligning the hip/ knee and using gravity facilitated position to increase B knee extension. Pt instructed with the following ex in this position:   1. Alternating hip/ knee flex/ext  2. Isometrics   3. IR/ ER  With knees extended  Steven 10 reps with each ex. Req additional time to complete. Pt remained in w/c with chair alarm activated. G-Code     OutComes Score                                                     AM-PAC Score             Goals  Short term goals  Time Frame for Short term goals: 2 weeks  Short term goal 1: Pt will perform all bed mobility with Osei. Ongoing  Short term goal 2: Pt will perform squat pivot with MaxA. Ongoing  Short term goal 3: Pt will perform sit to/from stand with LRAD and MaxA. Ongoing  Short term goal 4: Pt will propel w/c 150' with supervision. Ongoing  Long term goals  Time Frame for Long term goals : 3-4 weeks  Long term goal 1: Pt will perform all bed mobility with supervision. Ongoing  Long term goal 2: Pt will perform squat pivot with CGA. Ongoing  Long term goal 3: Pt will perform sit to/from stand with LRAD and Osei. Ongoing  Long term goal 4: Pt will propel w/c 150' with [place]tazar. Ongoing  Patient Goals   Patient goals : \"I want to walk again. \"    Plan    Plan  Times per week: 5x/week, 75 minutes a day  Current Treatment Recommendations: Strengthening, Neuromuscular Re-education, Home Exercise Program, ROM, Safety Education & Training, Balance Training, Endurance Training, Patient/Caregiver Education & Training, Functional Mobility Training, Wheelchair Mobility Training, Equipment Evaluation, Education, & procurement, Transfer Training, Gait Training  Safety Devices  Type of devices:  All fall risk precautions in place, Call light within reach, Chair alarm in place, Gait belt, Left in chair, Nurse notified     Therapy Time   Individual Concurrent Group Co-treatment   Time In       0930   Time Out       1030   Minutes       60   Timed Code Treatment Minutes: [address] Time:   [address] [facility]   Time In 9539     [address]     1425   Minutes 20     20     Timed Code Treatment Minutes:  00+70+12    Total Treatment Minutes: R [address], PT

## 2021-06-07 NOTE — PROGRESS NOTES
ACUTE REHAB UNIT  SPEECH/LANGUAGE PATHOLOGY      [x] Daily  [] Weekly Care Conference Note  [] Discharge    Patient: Rj Keane      :1945  QEY:[id number]  Rehab Dx/Hx: 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, unspecified ([facility] [address] [I61.2]    Precautions: [] Aspiration  [x] Fall risk  [] Sternal  [] Seizure [] Hip  [] Weight Bearing [] Other  ST Dx: [] Aphasia  [] Dysarthria  [] Apraxia   [] Oropharyngeal dysphagia [x] Cognitive Impairment  [] Other:   Date of Admit: 2021  Room #: 3104/3104-01  Date: 2021          Current Diet Order:DIET GENERAL;  Dietary Nutrition Supplements: Standard High Calorie Oral Supplement   Vision  Vision: Impaired  Vision Exceptions: Wears glasses at all times  Hearing  Hearing: Within functional limits     Comments: Per admitting H&P (2021): 'Patient is a 68 yo female who originally admitted to hospital /-21 for Right intracerebral hemorrhage with L hemiparesis. She has a know past medical hx of systolic CHF (EF 42%), A Fib (on warfarin), ICD, HTN, Hypothyroidism, rectal cancer s/p resection, and ITP. Her hemorrhage became worse with a midline shift and decline in her neurologic status. Patient was started on seizure prophalxis, and neurosurgery followed, but no indication for surgery. She was also treated for UTI, anti-hypertensives adjusted to maintain bp in normal range  with ICH. She failed MBS and GI consulted who recommended PEG. She was also treated with zosyn for possible aspiration pneumonia. S/P PEG placed on 21. EGD recommended PPI therapy. She remained stable and was discharged to [facility]. Patient presented to SNF on 21. She improved with her swallowing and tolerated a regular diet,  and GI removed PEG on 21. Patient has made significant progress in her alertness and activity tolerance, and it is felt that she would benefit from and could tolerate intensive inpatient rehab unit treatment at this time.   Prior to her ICH, she was independent will all ADLs and functional mobility without any AD. She is currently supervision for bed mobility, Max A for stand pivot transfer, Osei for UE dress, MaxA for LB dressing, Osei for slide board transfers, and supervision for WC mobility. She is highly motivated and able to participate in 3 hrs of therapy per day in ARU setting. '  Social/Functional History  Lives With: Spouse;Daughter  Active : Yes  Occupation: Unemployed  [address]: Enjoys going to the [employer]  Additional Comments: Manages own medications, finances    Barriers toward progress: Limited progress noted in memory encoding and recall     Date: 6/7/2021     Tx session 1   Total Timed Code Min 30   Total Treatment Minutes 30   Individual Treatment Minutes 30   Group Treatment Minutes 0   Co-Treat Minutes 0   Brief Exception: N/A   Pain None indicated    Pain Intervention: [] RN notified  [] Repositioned  [] Intervention offered and patient declined  [x] N/A  [] Other:    Subjective:     Exceptionally pleasant as is her usual. Participates well in therapy session and motivated to ask questions about rationale for tasks. Objective / Goals:    Patient will participate in ongoing cognitive-linguistic assessment. GOAL MET     Patient will complete tasks involving executive function skills with 90% accuracy/min cues. Intermediate level category differentiation - no cues     Patient will complete graded recall tasks with 85% accuracy or min cues. Novel leisure activity mod fading to min cues with repetition. Other areas targeted:    Education:   Education ongoing regarding rationale for graded tasks. Safety Devices: [x] Call light within reach  [x] Chair alarm activated and connected to nurse call light system  [] Bed alarm activated   [] Other:    Assessment:  Memory impairments. Pt demonstrates stimulability for routine and benefitting from repetition but limited improvement in overall memory ability noted. Resolving executive function skills. Plan: Continue per plan of care.      Interventions used this date:  [] Speech/Language Treatment  [] Instruction in HEP  [] Dysphagia Treatment [x] Cognitive Treatment   [] Other:  Discharge recommendations:  [] Home independently  [x] Home with assistance []  24 hour supervision  [] ECF [] Other  Continued Tx Upon Discharge: ? [] Yes    [] No    [x] TBD based on progress while on ARU     [] Vital Stim indicated     [] Other:   Estimated discharge date: 6/12/21    Jessa Patel., Eulogio  Speech-Language Pathologist

## 2021-06-07 NOTE — PROGRESS NOTES
Department of Physical Medicine & Rehabilitation  Progress Note    Patient Identification:  Binu Monterroso  [id number]  : 1945  Admit date: 2021    Chief Complaint: L hemiparesis due to R intracerebral hemorrhage    Subjective:   Doing well this morning but still having trouble with ADLs in therapy. She wants to feel better and discharge ASAP.     ROS: No f/c, n/v, cp. Objective:  Patient Vitals for the past 24 hrs:   BP Temp Temp src Pulse Resp SpO2 Weight   21 0918 114/68 98.1 °F (36.7 °C) Oral 72 16     21 0654       174 lb 2.6 oz (79 kg)   21 108/66 97.5 °F (36.4 °C) Oral 74 18 94 %    21 1627 108/70   75        Const: Alert. No distress, pleasant. HEENT: Normocephalic, atraumatic. Normal sclera/conjunctiva. MMM. CV: Regular rate and rhythm. Resp: No respiratory distress. Lungs CTAB. Abd: Soft, nontender, nondistended, NABS+   Ext: No edema. Neuro: Alert, oriented, appropriately interactive. Left neglect noted. Motor examination reveals normal strength in right side, 4-/5 strength left side diffusely. Psych: Cooperative, appropriate mood and affect    Laboratory data: Available via EMR.    Last 24 hour lab  Recent Results (from the past 24 hour(s))   Basic metabolic panel    Collection Time: 21  6:49 AM   Result Value Ref Range    Sodium 134 (L) 136 - 145 mmol/L    Potassium 4.0 3.5 - 5.1 mmol/L    Chloride 99 99 - 110 mmol/L    CO2 26 21 - 32 mmol/L    Anion Gap 9 3 - 16    Glucose 96 70 - 99 mg/dL    BUN 12 7 - 20 mg/dL    CREATININE <0.5 (L) 0.6 - 1.2 mg/dL    GFR Non-African American >60 >60    GFR African American >60 >60    Calcium 9.1 8.3 - 10.6 mg/dL   CBC auto differential    Collection Time: 21  6:49 AM   Result Value Ref Range    WBC 5.8 4.0 - 11.0 K/uL    RBC 3.96 (L) 4.00 - 5.20 M/uL    Hemoglobin 12.3 12.0 - 16.0 g/dL    Hematocrit 35.3 (L) 36.0 - 48.0 %    MCV 89.2 80.0 - 100.0 fL    MCH 31.0 26.0 - 34.0 pg    MCHC Bladder: Check PVR x 3.   [facility] if PVR > 200ml or if any volume is > 500 ml.      Pain: Tylenol is ordered prn.        Rehab Progress: Improving  Anticipated Dispo: SNF  Services/DME: TBD  ELOS: 6/17      KAT LaddP.H  PM&R  6/7/2021  10:36 AM

## 2021-06-07 NOTE — PROGRESS NOTES
Occupational Therapy  Facility/Department: [facility] ACUTE REHAB UNIT  Daily Treatment Note  NAME: Wyatt Coyne  : 1945  MRN: [id number]    Date of Service: 2021    Discharge Recommendations:  Home with Home health OT, Home with nursing aide, 24 hour supervision or assist, Continue to assess pending progress  OT Equipment Recommendations  ADL Assistive Devices: Transfer Tub Bench;Long-handled Sponge;Grab Bars - shower;Grab Bars - toilet  Other: Pt may benefit from a hospital bed pending progress. Pt will benefit from a cong height 20'' wide w/c. Assessment   Performance deficits / Impairments: Decreased functional mobility ; Decreased cognition;Decreased ADL status; Decreased endurance;Decreased fine motor control;Decreased ROM; Decreased sensation;Decreased coordination;Decreased strength;Decreased balance;Decreased posture;Decreased safe awareness  Assessment: Pt demonstrated good participation in OT today as evident by pt ability to maintain stance in standing frame for up to 7 mins. Pt engaged in functional reaching tasks in standing frame w/ great effort noted however w/ increased difficulty extending through B knees. Pt requires fluctuating levels of assistance for functional transfers pending the surface she is transferring to and depending on her energy. Pt is highly motivated and continues to make good progress. Pt continues to benefit from OT. Cont OT per POC  Treatment Diagnosis: Decreased ADL status and decreased ROM 2/2 CVA    OT Education: Precautions;Transfer Training  Patient Education: standing frame and weight bearing- pt verb understanding  REQUIRES OT FOLLOW UP: Yes  Activity Tolerance  Activity Tolerance: Patient Tolerated treatment well;Patient limited by fatigue  Activity Tolerance: Pt reported being exhausted but tolerated standing frame activity well  Safety Devices  Safety Devices in place: Yes  Type of devices: Call light within reach; Chair alarm in place; Left in chair;Nurse notified         Patient Diagnosis(es): There were no encounter diagnoses. has a past medical history of Arthritis, CAD (coronary artery disease), Cancer ([facility] [address] Cerebral artery occlusion with cerebral infarction [facility]), CHF (congestive heart failure) ([facility] [address] Hypertension, and Thyroid disease. has a past surgical history that includes Abdomen surgery; Hysterectomy; Colonoscopy; hernia repair; and pacemaker placement. Restrictions  Position Activity Restriction  Other position/activity restrictions: fall precautions, as of 6/1/2021, pt is now untethered from wall in w/c on unit  Subjective   General  Chart Reviewed: Yes  Patient assessed for rehabilitation services?: Yes  Additional Pertinent Hx: Per MD H and P:Patient is a 68 yo female who originally admitted to [facility] /23/21-2/9/21 for Right intracerebral hemorrhage with L hemiparesis. She has a know past medical hx of systolic CHF (EF 93%), A Fib (on warfarin), ICD, HTN, Hypothyroidism, rectal cancer s/p resection, and ITP. Her hemorrhage became worse with a midline shift and decline in her neurologic status. Patient was started on seizure prophalxis, and neurosurgery followed, but no indication for surgery. She was also treated for UTI, anti-hypertensives adjusted to maintain bp in normal range  with ICH. She failed MBS and GI consulted who recommended PEG. She was also treated with zosyn for possible aspiration pneumonia. S/P PEG placed on 2/6/21. EGD recommended PPI therapy. She remained stable and was discharged to [facility]. Patient presented to SNF on 2/9/21. She improved with her swallowing and tolerated a regular diet,  and GI removed PEG on 5/18/21. Pt had been at the [facility] since February and has now exhausted her days left at the SNF.  Pt admitted to ARU 5/21  Family / Caregiver Present: No  Referring Practitioner: Dr. Elizabeth Ham  Diagnosis: CVA  Subjective  Subjective: Pt was seated in w/c upon arrival. Pt reported being exhausted today but was agreeable to OT/PT. Co treat indicated to maximize functional independence. General Comment  Comments: Pt's daughter Manual Player provided OT and PT w/ measurements of home in order to practice transfers according to home setup. Measurement are as follows: Bed height- 26''. Door frames- 33''. Floor to top of toilet- 16''. Floor to couch-17.5''. Shower step up- 6''    Vital Signs  Patient Currently in Pain: Yes (Pt B knee pain w/ standing frame activity however pain subsided w/ rest)   Orientation     Objective             Balance  Sitting Balance: Stand by assistance  Standing Balance: Dependent/Total  Standing Balance  Time: 7 mins + 4 mins +  2 mins 20s  Activity: Standing frame  Comment: Pt engaged in standing activity in standing frame to promote weight bearing through BLEs. Pt maintained stance for 7 mins, 4 mins, and 2 mins 20s w/ rest breaks needed between each trial. While in stance pt engaged in functional reaching task to promote weight shifting. Pt reached outside of JHOAN in various planes and directions w/ RUE and LUE to retrieve sticky notes. Pt completed w/ VCs and TCs for increased RLE weight bearing, due to pt tendency to lean to the L. Pt maintained UE support on tabletop when not actively using BUEs. Pt demonstrated knee flexion in frame w/o ability to completely extend through BLEs. VCs and TCs provided at buttocks and shoulders to promote erect posture. Mirror was placed in front of pt to self correct posture. Pt c/o min knee pain in stance however pt stated pain subsided w/ rest. While in the standing frame, pt also completed 3 trials of sit to stand from ~60 degrees. Pt maintained stance for 4s, 12s, and 20s w/ min-mod A provided at LLE to achieve stance. Functional Mobility  Functional - Mobility Device: Wheelchair  Activity: To/From therapy gym  Assist Level: Supervision  Functional Mobility Comments: VCs needed to lock w/c brakes.  Assistance needed to manage lap tray and arm rests Transfers  Sit Pivot Transfers: Dependent/Total;2 Person assistance (Max Ax1 + Min Ax1 from w/c > standing frame. Min Ax1 + CGA x1 from standing frame to w/c)                         Cognition  Arousal/Alertness: Appropriate responses to stimuli  Following Commands: Follows multistep commands with increased time; Follows multistep commands with repitition  Attention Span: Attends with cues to redirect  Memory: Decreased recall of recent events;Decreased short term memory  Safety Judgement: Decreased awareness of need for assistance  Problem Solving: Assistance required to generate solutions  Insights: Fully aware of deficits  Initiation: Requires cues for some  Sequencing: Requires cues for some  Cognition Comment: Pt reported feeling very exhausted today       Perception  Unilateral Attention: Cues to attend left visual field;Cues to attend to left side of body  Initiation: Cues to initiate tasks          2nd session:  Pt was asleep in bed upon arrival. Upon awakening, pt was pleasant and agreeable to OT/PT. Half co treat completed this session to maximize functional independence. LUE neuromuscular re-education:  Pt engaged in supine shoulder flexion x10 reps. Pt required TCs and VCs to maintain elbow extension and to maintain forearm in neutral position. Pt demonstrated increased spasticity, especially in bicep w/ ROM. OT provided manual stretch to aid in relief. Pt had no awareness of spasticity and she reported she could not feel OT stretching LUE. Bed mobility:  Supine> sit- SBA w/o use of bed rail. VCs needed for technique    Functional transfers:  Scoot pivot- completed from EOB > w/c w/ min Ax2. VCs needed for technique  Toilet- scoot pivot completed from w/c <> standard toilet w/ Mod Ax1 + Min Ax1. VCs needed for BLE placement. ADLs:  Toileting- Pt weight shifted L and R on toilet while OT assisted w/ removal of clothing.  Pt also completed partial stance w/ assist x2 in order to manage clothes down completely. Pt wiped her self I'ly seated on toilet. OT assisted w/ removal of soiled underwear and donned clean pair. Pt stood at [employer] w/ Max Ax2 in order to manage clothes over hips. Pt was left in w/c at EOS w/ PT present for remainder of session.                                  Plan   Plan  Times per week: 5x a week for 75 mins daily  Times per day: Daily  Current Treatment Recommendations: Strengthening, Wheelchair Mobility Training, Positioning, ROM, Safety Education & Training, Balance Training, Patient/Caregiver Education & Training, Self-Care / ADL, Cognitive/Perceptual Training, Functional Mobility Training, Neuromuscular Re-education, Equipment Evaluation, Education, & procurement, Home Management Training, Endurance Training  G-Code     OutComes Score                                                  AM-PAC Score             Goals  Short term goals  Time Frame for Short term goals: 2 weeks  Short term goal 1: Pt will complete toilet transfer w/ Mod A-ongoing  Short term goal 2: Pt will complete LE dressing supine in bed w/ Min A- ongoing  Short term goal 3: Pt will complete UE dressing w/ Min A-goal met 5/25  Short term goal 4: Pt will complete bathing tasks w/ Min A-goal met 6/4  Short term goal 5: Pt will demonstrate improved functional use of LUE as evident by pt ability to open grooming containers I'ly-ongoing  Long term goals  Time Frame for Long term goals : 4 weeks- all goals ongoing  Long term goal 1: Pt will complete toilet transfer w/ CGA  Long term goal 2: Pt will complete toileting tasks w/ CGA  Long term goal 3: Pt will complete LE dressing (either supine or bed or seated ) w/ CGA  Long term goal 4: Pt will complete UE dressing w/ setup and spvn  Long term goal 5: Pt will complete bathing tasks w/ SBA  Patient Goals   Patient goals : \"be able to walk\"       Therapy Time   Individual Concurrent Group Co-treatment   Time In       0930   Time Out       1030   Minutes       60   Timed Code Treatment Minutes: 60 Minutes       Second Session Therapy Time:   Individual Concurrent Group Co-treatment   Time In  6616      8971   Time Out  3406      2564   Minutes  20      20     Timed Code Treatment Minutes:  40    Total Treatment Minutes:  60+40= [facility],

## 2021-06-07 NOTE — PROGRESS NOTES
Pt A & O. VSS. Pt x 2 scoot pivot. All safety precautions in place. Bed alarm activated. No complaints of pain or nausea voiced. Tolerates diet. Incontinent at times. Encouraged independence with patient such as getting a drink of water off bedside table. Pt refused pillow underneath side, educated on importance of offloading to protect from skin breakdown.   Will cont to monitor.

## 2021-06-07 NOTE — PLAN OF CARE
Problem: Falls - Risk of:  Goal: Will remain free from falls  Description: Will remain free from falls  6/7/2021 0029 by Brisa Wilson RN  Outcome: Ongoing  Note: Pt is a Med fall risk. Explained fall risk precautions to pt and rationale behind their use to keep the patient safe. Belongings are in reach. Pt encouraged to notify staff for any and all assistance. Staff present in tx suite throughout entirety of pts treatment to monitor and protect from falls. Assistance provided when ambulating to restroom utilizing Stay With Me. Problem: Skin Integrity:  Goal: Absence of new skin breakdown  Description: Absence of new skin breakdown  Outcome: Ongoing  Note: No new evidence of skin breakdown.       Problem: Pain:  Goal: Pain level will decrease  Description: Pain level will decrease  Outcome: Ongoing  Note: No complaints

## 2021-06-08 PROCEDURE — 6370000000 HC RX 637 (ALT 250 FOR IP): Performed by: PHYSICAL MEDICINE & REHABILITATION

## 2021-06-08 PROCEDURE — 97535 SELF CARE MNGMENT TRAINING: CPT

## 2021-06-08 PROCEDURE — 97130 THER IVNTJ EA ADDL 15 MIN: CPT

## 2021-06-08 PROCEDURE — 99999 PR OFFICE/OUTPT VISIT,PROCEDURE ONLY: CPT | Performed by: PHYSICAL MEDICINE & REHABILITATION

## 2021-06-08 PROCEDURE — 1280000000 HC REHAB R&B

## 2021-06-08 PROCEDURE — 6360000002 HC RX W HCPCS: Performed by: PHYSICAL MEDICINE & REHABILITATION

## 2021-06-08 PROCEDURE — 97129 THER IVNTJ 1ST 15 MIN: CPT

## 2021-06-08 PROCEDURE — 97530 THERAPEUTIC ACTIVITIES: CPT | Performed by: PHYSICAL THERAPIST

## 2021-06-08 PROCEDURE — 97530 THERAPEUTIC ACTIVITIES: CPT

## 2021-06-08 RX ADMIN — Medication 2000 UNITS: at 09:30

## 2021-06-08 RX ADMIN — MAGNESIUM OXIDE TAB 400 MG (240 MG ELEMENTAL MG) 400 MG: 400 (240 MG) TAB at 20:44

## 2021-06-08 RX ADMIN — LEVOTHYROXINE SODIUM 50 MCG: 0.05 TABLET ORAL at 05:54

## 2021-06-08 RX ADMIN — ASPIRIN 81 MG: 81 TABLET, CHEWABLE ORAL at 09:30

## 2021-06-08 RX ADMIN — MAGNESIUM OXIDE TAB 400 MG (240 MG ELEMENTAL MG) 400 MG: 400 (240 MG) TAB at 18:28

## 2021-06-08 RX ADMIN — PREDNISONE 7.5 MG: 5 TABLET ORAL at 09:30

## 2021-06-08 RX ADMIN — CARVEDILOL 25 MG: 12.5 TABLET, FILM COATED ORAL at 09:30

## 2021-06-08 RX ADMIN — POLYETHYLENE GLYCOL 3350 17 G: 17 POWDER, FOR SOLUTION ORAL at 09:30

## 2021-06-08 RX ADMIN — AMLODIPINE BESYLATE 5 MG: 5 TABLET ORAL at 09:30

## 2021-06-08 RX ADMIN — DICLOFENAC SODIUM 2 G: 10 GEL TOPICAL at 09:30

## 2021-06-08 RX ADMIN — ENOXAPARIN SODIUM 40 MG: 40 INJECTION SUBCUTANEOUS at 10:30

## 2021-06-08 RX ADMIN — MAGNESIUM OXIDE TAB 400 MG (240 MG ELEMENTAL MG) 400 MG: 400 (240 MG) TAB at 09:30

## 2021-06-08 RX ADMIN — DICLOFENAC SODIUM 2 G: 10 GEL TOPICAL at 20:44

## 2021-06-08 RX ADMIN — LISINOPRIL 40 MG: 40 TABLET ORAL at 10:30

## 2021-06-08 RX ADMIN — Medication 5 MG: at 20:44

## 2021-06-08 RX ADMIN — POTASSIUM CHLORIDE 20 MEQ: 20 TABLET, EXTENDED RELEASE ORAL at 09:30

## 2021-06-08 RX ADMIN — PANTOPRAZOLE SODIUM 40 MG: 40 TABLET, DELAYED RELEASE ORAL at 05:54

## 2021-06-08 RX ADMIN — MAGNESIUM OXIDE TAB 400 MG (240 MG ELEMENTAL MG) 400 MG: 400 (240 MG) TAB at 13:30

## 2021-06-08 RX ADMIN — CARVEDILOL 25 MG: 12.5 TABLET, FILM COATED ORAL at 18:32

## 2021-06-08 RX ADMIN — CITALOPRAM HYDROBROMIDE 20 MG: 20 TABLET ORAL at 09:30

## 2021-06-08 RX ADMIN — MICONAZOLE NITRATE: 20 POWDER TOPICAL at 09:30

## 2021-06-08 RX ADMIN — MICONAZOLE NITRATE: 20 POWDER TOPICAL at 20:45

## 2021-06-08 RX ADMIN — ATORVASTATIN CALCIUM 40 MG: 40 TABLET, FILM COATED ORAL at 20:44

## 2021-06-08 RX ADMIN — FUROSEMIDE 20 MG: 20 TABLET ORAL at 10:30

## 2021-06-08 ASSESSMENT — PAIN SCALES - GENERAL
PAINLEVEL_OUTOF10: 0

## 2021-06-08 NOTE — PROGRESS NOTES
Department of Physical Medicine & Rehabilitation  Progress Note    Patient Identification:  Abhay Recio  [id number]  : 1945  Admit date: 2021    Chief Complaint: L hemiparesis due to R intracerebral hemorrhage    Subjective:   Patient was seen and examined at bedside this AM. She reports feeling better but endorses mild shortness of breath with exertion. Denies fever/chills, nausea/vomiting or chest pain. Objective:  Patient Vitals for the past 24 hrs:   BP Temp Temp src Pulse Resp SpO2   21 122/71 97.7 °F (36.5 °C) Oral 71 16 97 %     Const: Alert. No distress, pleasant. HEENT: Normocephalic, atraumatic. Normal sclera/conjunctiva. MMM. CV: Regular rate and rhythm. Resp: No respiratory distress. Lungs CTAB. Abd: Soft, nontender, nondistended, NABS+   Ext: No edema. Neuro: Alert, oriented, appropriately interactive. Left neglect noted. Motor examination reveals normal strength in right side, 4-/5 strength left side diffusely. Psych: Cooperative, appropriate mood and affect    Laboratory data: Available via EMR. Last 24 hour lab  No results found for this or any previous visit (from the past 24 hour(s)).     Therapy progress:  PT  Position Activity Restriction  Other position/activity restrictions: fall precautions, as of 2021, pt is now untethered from wall in w/c on unit  Objective     Sit to Stand: Dependent/Total (Used the standing frame in which pt was transitioned STS x3 ( initially alicia 7 minutes, 4 min and then 2 minutes))  Stand to sit: Dependent/Total (Use of the hydaulic to lower pt back to sitting)  Bed to Chair: Dependent/Total (HIgher > lower with Min A x2/)     OT  PT Equipment Recommendations  Other: continue to assess, pt states that she owns a RW  Toilet - Technique: Sit pivot  Equipment Used: Standard toilet  Toilet Transfers Comments: Scoot pivot completed to/from standard height toilet w/ Min A x1 + CGAx1 and min VCs needed for BLE position  Assessment SLP          Body mass index is 31.85 kg/m². Assessment and Plan:  Right intracerebral hemorrhage with L hemiparesis  - Cont lipitor 40 mg daily, ASA 81 mg daily     UTI, resolved   UA: cloudy, large amoutn of LE, WBC 10-20, bacteria rare. - Urine culture growing Klebsiella pneumoniae   - Completed course of amoxicillin     Systolic CHF (EF 45%)  - Cont coreg 25 mg BID,Lasix 20 mg daily     A Fib with ICD pacemaker  - Cont ASA (had been on Coumadin)     HTN  - Cont norvasc 5 mg daily, lisinopril 40 mg daily     HLD  - Cont lipitor 40 mg daily     Hypothyroidism  - Cont synthroid 50 mcg daily     Hx of rectal cancer s/p resection  - Colostomy present, monitor output    PPx: protonix 40 mg daily, lovenox 40 mg daily     ITP- Monitor     Bowels: Schedule Miralax + Senna S. Follow bowel movements. Enema or suppository if needed.      Bladder: Check PVR x 3. [address] if PVR > 200ml or if any volume is > 500 ml.      Pain: Tylenol is ordered prn.        Rehab Progress: Improving  Anticipated Dispo: SNF  Services/DME: TBD  ELOS: 6/17      Tatyana James MD   PGY-3  6/8/2021  10:27 AM    Will discuss with the attending physician, Dr. Debbie Decker DO.      Han Lino, CHARLETTE.O. M.P.H  PM&R  6/8/2021  4:22 PM

## 2021-06-08 NOTE — PROGRESS NOTES
ACUTE REHAB UNIT  SPEECH/LANGUAGE PATHOLOGY      [x] Daily  [] Weekly Care Conference Note  [] Discharge    Patient: Summer Schumacher      :1945  RJA:[id number]  Rehab Dx/Hx: 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, unspecified ([facility] [address] [I61.2]    Precautions: [] Aspiration  [x] Fall risk  [] Sternal  [] Seizure [] Hip  [] Weight Bearing [] Other  ST Dx: [] Aphasia  [] Dysarthria  [] Apraxia   [] Oropharyngeal dysphagia [x] Cognitive Impairment  [] Other:   Date of Admit: 2021  Room #: 3104/3104-01  Date: 2021          Current Diet Order:DIET GENERAL;  Dietary Nutrition Supplements: Standard High Calorie Oral Supplement   Vision  Vision: Impaired  Vision Exceptions: Wears glasses at all times  Hearing  Hearing: Within functional limits     Comments: Per admitting H&P (2021): 'Patient is a 66 yo female who originally admitted to hospital /-21 for Right intracerebral hemorrhage with L hemiparesis. She has a know past medical hx of systolic CHF (EF 01%), A Fib (on warfarin), ICD, HTN, Hypothyroidism, rectal cancer s/p resection, and ITP. Her hemorrhage became worse with a midline shift and decline in her neurologic status. Patient was started on seizure prophalxis, and neurosurgery followed, but no indication for surgery. She was also treated for UTI, anti-hypertensives adjusted to maintain bp in normal range  with ICH. She failed MBS and GI consulted who recommended PEG. She was also treated with zosyn for possible aspiration pneumonia. S/P PEG placed on 21. EGD recommended PPI therapy. She remained stable and was discharged to [facility] [place]. Patient presented to SNF on 21. She improved with her swallowing and tolerated a regular diet,  and GI removed PEG on 21. Patient has made significant progress in her alertness and activity tolerance, and it is felt that she would benefit from and could tolerate intensive inpatient rehab unit treatment at this time.   Prior to her ICH, she was independent will all ADLs and functional mobility without any AD. She is currently supervision for bed mobility, Max A for stand pivot transfer, Osei for UE dress, MaxA for LB dressing, Osei for slide board transfers, and supervision for WC mobility. She is highly motivated and able to participate in 3 hrs of therapy per day in ARU setting. '  Social/Functional History  Lives With: Spouse;Daughter  Active : Yes  Occupation: Unemployed  [address]: Enjoys going to the [employer]  Additional Comments: Manages own medications, finances    Barriers toward progress: Limited progress noted in memory encoding and recall     Date: 6/8/2021     Tx session 1   Total Timed Code Min 30   Total Treatment Minutes 30   Individual Treatment Minutes 30   Group Treatment Minutes 0   Co-Treat Minutes 0   Brief Exception: N/A   Pain Denied when questioned   Pain Intervention: [] RN notified  [] Repositioned  [] Intervention offered and patient declined  [x] N/A  [] Other:    Subjective:     Pt up in w/c upon entry with call light activated - requesting toileting from RN. Reported waiting \"half an hour\" to go to restroom at this point but stated \"well we might as well get started [facility] \" Very pleasant and cooperative. Objective / Goals:    Patient will participate in ongoing cognitive-linguistic assessment. GOAL MET     Patient will complete tasks involving executive function skills with 90% accuracy/min cues. Min cues for sustained attention to task    Generating mnemonic to assist with recall: max fading to mod cues for cognitive flexibility and self-monitoring of errors. Reduced attention resulted in reduced carryover of directions t/o task. Patient will complete graded recall tasks with 85% accuracy or min cues. Independently recalled events from AM and weekend as well as tasks completed in [address] [place] [address] yesterday. Independent recall of recs from PT for number of w/c laps daily.     Min cues to identify recall strategies of repetition, visualization, and association. Recall black / white line drawings from previous session: 20% accuracy independently, improving to 80% accuracy with category cues for 4/5 items. Recall black / white line drawings with use of mnemonic device: dependent. Pt recalled novelty of mnemonic but unable at this time to recall, limiting recall of pictured items. Other areas targeted:    Education:   Educated to use of mnemonic devices for recall and additional memory strategies as well as skills targeted with tx tasks. Safety Devices: [x] Call light within reach  [x] Chair alarm activated and connected to nurse call light system  [] Bed alarm activated   [x] Other: PT at side for scheduled tx session     Assessment:  Cognitive linguistic impairment with memory deficits and mild executive dysfunction. Use of mnemonic device not a successful strategy for pt this date. Improvement in recall noted with use of visualization and association; pt benefits from category cues as well. Plan: Continue per plan of care.      Interventions used this date:  [] Speech/Language Treatment  [] Instruction in HEP  [] Dysphagia Treatment [x] Cognitive Treatment   [] Other:  Discharge recommendations:  [] Home independently  [x] Home with assistance []  24 hour supervision  [] ECF [] Other  Continued Tx Upon Discharge: ? [] Yes    [] No    [x] TBD based on progress while on ARU     [] Vital Stim indicated     [] Other:   Estimated discharge date: 6/12/21      Rina Wall MA CCC-SLP; [id number]  Speech-Language Pathologist

## 2021-06-08 NOTE — CARE COORDINATION
Referred to patient for d/c Saturday. Spoke to team, anticipate d/c Saturday. Spoke to  Patient and daughter Chente gAarwal. Chente Batch to take [facility] and will be providing 24 hour care for patient. Discussed DME, daughter is agreeable to purchase tub transfer bench. Discussed wheelchair with lap tray and referral made to [facility]. Lap try is not covered and will cost $80-$120. [facility]. Discussed home care. She feels patient has had [facility] or [facility] in past.  Referral made to [facility], await response if they can accept patient. Per daughter they prefer w/c transport home. They are aware this is an out of pocket expense. Patient transported by [facility] to ARU. No other needs at this time.   Electronically signed by ISSA Mahoney LISW-S on 6/8/2021 at 3:03 PM

## 2021-06-08 NOTE — PROGRESS NOTES
Physical Therapy  Facility/Department: [place] [address] ACUTE REHAB UNIT  Daily Treatment Note  NAME: Maxx Goldberg  : 1945  MRN: [id number]    Date of Service: 2021    Discharge Recommendations:  24 hour supervision or assist, Home with Home health PT   PT Equipment Recommendations  Other: continue to assess, pt states that she owns a RW    Assessment   Body structures, Functions, Activity limitations: Decreased functional mobility ; Decreased safe awareness;Decreased balance;Decreased cognition;Decreased vision/visual deficit; Decreased endurance;Decreased strength;Decreased sensation;Decreased fine motor control  Assessment: Pt progressing steadily with transf and ability to stand  with assist.  Pt continues to be limited by intermittent  pain in B knees , decreased overall endurance, poor L extrem proprioception and L hemiparesis. Pt maximizes effort with each session despite c/o severe fatigue. Pt is well below baseline and would benefit from cont therapy to maximize potential and increase functional obility towards Ind to allow for a safer d/c to home. Treatment Diagnosis: decreased functional mobility due to nontraumatic ICH  Decision Making: Medium Complexity  PT Education: General Safety; Energy Conservation;Transfer Training;Orientation; Adaptive Device Training;Functional Mobility Training  Activity Tolerance  Activity Tolerance: Patient limited by fatigue;Patient limited by endurance; Patient limited by pain  Activity Tolerance: Pt requests rests between therapy activities and was consistently reporting increased fatigue on this date     Patient Diagnosis(es): There were no encounter diagnoses. has a past medical history of Arthritis, CAD (coronary artery disease), Cancer ([facility] [address] Cerebral artery occlusion with cerebral infarction [facility]), CHF (congestive heart failure) ([facility] [address] Hypertension, and Thyroid disease. has a past surgical history that includes Abdomen surgery;  Hysterectomy; Colonoscopy; hernia collaboratively to utilize the skills of 2 disciplines while maximizing functional mobility to obtain optimal potential.    Pt sitting in w/c when PT arrived. Pt requesting to go to the bathroom. Pt's daughter present for family training. Pt's daughter Lydia Saucedo ) is an OT  And already familiar with donning and doffing gt belt. Therapy provided transf training for pt both to and from commode ( close to being  level surfaces) and to and from mat table to simulate bed height at home. Pt's daughter videotaped transf and bed mobility to use as a reference for VC nad effective technique. Bed mobility: Sit > supine Supervision   Supine > sidelying : Supervisison  Supine > sitting : CGA with VC for ant WS to safely complete transition. STS in bathroom with toileting and LB clothing management:Dependent : Mod A x1 and min A x1Pt req additional assist with L hand stability on the GB. Squat Pivot Transfers: 2 Person Assistance (w/c <>commode and w/c <> mat table req Min A x1  And CGA x1 with pt doing a scooting technique leading consistently with the R)VC for L foot/ hand placement   Pt stayed in w/c with call light and phone placed within reach. Chair alarm activated. G-Code     OutComes Score                                                     AM-PAC Score             Goals  Short term goals  Time Frame for Short term goals: 2 weeks  Short term goal 1: Pt will perform all bed mobility with Osei. Ongoing  Short term goal 2: Pt will perform squat pivot with MaxA. Ongoing  Short term goal 3: Pt will perform sit to/from stand with LRAD and MaxA. Ongoing  Short term goal 4: Pt will propel w/c 150' with supervision. Ongoing  Long term goals  Time Frame for Long term goals : 3-4 weeks  Long term goal 1: Pt will perform all bed mobility with supervision. Ongoing  Long term goal 2: Pt will perform squat pivot with CGA. Ongoing  Long term goal 3: Pt will perform sit to/from stand with LRAD and Osei.  Ongoing  Long term goal 4: Pt will propel w/c 150' with Baltazar. Ongoing  Patient Goals   Patient goals : \"I want to walk again. \"    Plan    Plan  Times per week: 5x/week, 75 minutes a day  Current Treatment Recommendations: Strengthening, Neuromuscular Re-education, Home Exercise Program, ROM, Safety Education & Training, Balance Training, Endurance Training, Patient/Caregiver Education & Training, Functional Mobility Training, Wheelchair Mobility Training, Equipment Evaluation, Education, & procurement, Transfer Training, Gait Training  Safety Devices  Type of devices:  All fall risk precautions in place, Call light within reach, Chair alarm in place, Gait belt, Left in chair, Nurse notified     Therapy Time   Individual Concurrent Group Co-treatment   Time In       0930   Time Out       1030   Minutes       60     Second Session Therapy Time:   Individual Concurrent Group Co-treatment   Time In       1130   Time Out       1220   Minutes       50     Timed Code Treatment Minutes:  15+26    Total Treatment Minutes: [id number] [facility], PT

## 2021-06-08 NOTE — PROGRESS NOTES
Assessment complete, VSs, afebrile, medications taken without difficulty. Pt. Remains A & O X 4. Colostomy bag changed, stoma pink and intact. Pt. Voiced bilateral foot pain, PRN tylenol administered, see MAR. 1:1 and education on the use of the call light and when to call for assistance. Call light and over bed table within reach.

## 2021-06-08 NOTE — PROGRESS NOTES
Occupational Therapy  Facility/Department: [facility] ACUTE REHAB UNIT  Daily Treatment Note  NAME: Binu Monterroso  : 1945  MRN: [id number]    Date of Service: 2021    Discharge Recommendations:  Home with Home health OT, Home with nursing aide, 24 hour supervision or assist, Continue to assess pending progress  OT Equipment Recommendations  ADL Assistive Devices: Transfer Tub Bench;Long-handled Sponge;Grab Bars - shower;Grab Bars - toilet  Other: Pt may benefit from a hospital bed pending progress. Pt will benefit from a cong height 20'' wide w/c. Assessment   Performance deficits / Impairments: Decreased functional mobility ; Decreased cognition;Decreased ADL status; Decreased endurance;Decreased fine motor control;Decreased ROM; Decreased sensation;Decreased coordination;Decreased strength;Decreased balance;Decreased posture;Decreased safe awareness  Assessment: Pt demonstrated great participation in OT today as evident by pt ability to complete scoot pivot transfers to/from 20'' mat table w/ CGA-Min A today. Pt still required Min Ax2 to/from the toilet however pt is making great progress. Pt maintained stance for up to 40s in the [employer] walker today w/ limitations related to pt inability to extend fully through B knees. Pt is highly motivated and continues to make good progress. Cont OT per POC  Treatment Diagnosis: Decreased ADL status and decreased ROM 2/2 CVA    OT Education: ADL Adaptive Strategies;Transfer Training;Precautions  Patient Education: standing at [employer]- pt verb understanding  REQUIRES OT FOLLOW UP: Yes  Activity Tolerance  Activity Tolerance: Patient Tolerated treatment well  Safety Devices  Safety Devices in place: Yes  Type of devices: Call light within reach; Chair alarm in place; Left in chair;Nurse notified         Patient Diagnosis(es): There were no encounter diagnoses.       has a past medical history of Arthritis, CAD (coronary artery disease), Cancer ([facility] [address] Cerebral artery occlusion with cerebral infarction [facility]), CHF (congestive heart failure) ([facility] [address] Hypertension, and Thyroid disease. has a past surgical history that includes Abdomen surgery; Hysterectomy; Colonoscopy; hernia repair; and pacemaker placement. Restrictions  Position Activity Restriction  Other position/activity restrictions: fall precautions, as of 6/1/2021, pt is now untethered from wall in w/c on unit  Subjective   General  Chart Reviewed: Yes  Patient assessed for rehabilitation services?: Yes  Additional Pertinent Hx: Per MD H and P:Patient is a 66 yo female who originally admitted to [facility] /23/21-2/9/21 for Right intracerebral hemorrhage with L hemiparesis. She has a know past medical hx of systolic CHF (EF 37%), A Fib (on warfarin), ICD, HTN, Hypothyroidism, rectal cancer s/p resection, and ITP. Her hemorrhage became worse with a midline shift and decline in her neurologic status. Patient was started on seizure prophalxis, and neurosurgery followed, but no indication for surgery. She was also treated for UTI, anti-hypertensives adjusted to maintain bp in normal range  with ICH. She failed MBS and GI consulted who recommended PEG. She was also treated with zosyn for possible aspiration pneumonia. S/P PEG placed on 2/6/21. EGD recommended PPI therapy. She remained stable and was discharged to [facility]. Patient presented to SNF on 2/9/21. She improved with her swallowing and tolerated a regular diet,  and GI removed PEG on 5/18/21. Pt had been at the [facility] since February and has now exhausted her days left at the SNF. Pt admitted to [facility] 5/21  Family / Caregiver Present: No  Referring Practitioner: Dr. Wagner Worley  Diagnosis: CVA  Subjective  Subjective: Pt was seated in w/c upon arrival. Pt reported an urgent need to use the bathroom. Pt was pleasant and agreeable to OT/PT. Co treat indicated to maximize functional independence.   General Comment  Comments: Pt's daughter Susana Dominguez provided OT and PT w/ measurements of home in order to practice transfers according to home setup. Measurement are as follows: Bed height- 26''. Door frames- 33''. Floor to top of toilet- 16''. Floor to couch-17.5''. Shower step up- 6''    Vital Signs  Patient Currently in Pain: Denies   Orientation     Objective    ADL  Toileting: Dependent/Total (Assist x2 needed in stance to manage clothing up and down)          Balance  Sitting Balance: Stand by assistance  Standing Balance: Dependent/Total  Standing Balance  Time: 30s + 20s +40s +25s  Activity: Stance for toileting and stance at [facility] [place] walker  Comment: Pt engaged in standing activity w/ use of Debra walker to promote weight bearing through BLEs for pre-gait activity. Pt completed sit to stand transfers from [facility] to [facility] [place] [place] walker w/ CGAx2. Pt maintained stance at [facility] [place] [place] walker for 20s, 40s, and 25s w/ max VCs and TCs needed to promote thoracic extension and upright posture. Pt was unable to achieve full BLE extension due to pain and contractures in knees. Pt maintained forearm support in [facility] [place] [place] walker during standing activity. While standing OT and PT enouraged pt to try to go up onto her tip toes and to attempt to move LE forward. Pt was able to go up onto tip toes but could not step forward. Pt required extended rest breaks during activity and VCs for PLB. Pt had one episode following stance where she c/o dizziness. Pt was assisted into sidelying position on mat table and vitals were assessed. BP initially read 180/134 and then 85/59 which both appeared to be inaccurate. HR= 67 and SpO2 97%. Pt was assisted back to EOM and BP was re-checked. BP revealed 95/63. Pt stated she was not dizzy anymore. Functional Mobility  Functional - Mobility Device: Wheelchair  Activity: To/From therapy gym  Assist Level: Modified independent   Toilet Transfers  Toilet - Technique: Sit pivot  Equipment Used: Standard toilet  Toilet Transfer: Dependent/Total  Toilet Transfers Comments: w/c <> toilet completed w/ Min Ax2.  VCs needed for technique    Bed mobility  Sit to Supine: Maximum assistance (Max A onto mat table due to dizzy episode.)    Transfers  Sit Pivot Transfers: Minimal assistance (w/c > EOM Min A; EOM > w/c CGA)  Sit to stand: Dependent/Total (Pt fluctuated from Mod Ax1 + Min Ax1 from w/c to GB to CGAx2 from EOM to Jimmy anderson.)                         Cognition  Arousal/Alertness: Appropriate responses to stimuli  Following Commands: Follows multistep commands with increased time; Follows multistep commands with repitition  Attention Span: Attends with cues to redirect  Memory: Decreased recall of recent events;Decreased short term memory  Safety Judgement: Decreased awareness of need for assistance  Problem Solving: Assistance required to generate solutions  Insights: Fully aware of deficits  Initiation: Requires cues for some  Sequencing: Requires cues for some       Perception  Unilateral Attention: Cues to attend left visual field;Cues to attend to left side of body                             2nd session: Pt was seated on toilet w/ PT and daughter Santosh Cantrell present upon arrival. Santosh Cantrell present for family training this session. Pt was pleasant and agreeable to OT/PT co treat. Co treat indicated to maximize functional independence. Functional transfers: Toilet- scoot pivot completed from toilet to w/c w/ Min Ax1 + CGAx1. VCs needed for BLE positioning and LUE placement. Scoot pivot- completed from w/c <> EOM leading to the R. Transfers completed to 20'' mat table to practice transfer to similar height of bed at home (21''). Pt completed w/ CGA-Min Ax1. Sit <> stand- completed from w/c to GB w/ Mod Ax1 + Min Ax1. ADLs:  Toileting- pt completed marilyn care seated I'ly. Assist x2 needed in stance to manage clothes over hips. Family training:  Santosh Cantrell was present throughout session to observe transfers and film transfer techniques.  OT and PT discussed equipment needs: cong height w/c, lap tray, TTB, grab bars at toilet, and bed rails. Deepali Manjinder verb understanding of training and felt comfortable w/ how pt was performing transfers. Family training to resume on Thursday. OT assisted pt w/ w/c mobility to/from gym due to time constraints. Pt was left in w/c at EOS w/ chair alarm on and call light within reach.             Plan   Plan  Times per week: 5x a week for 75 mins daily  Times per day: Daily  Current Treatment Recommendations: Strengthening, Wheelchair Mobility Training, Positioning, ROM, Safety Education & Training, Balance Training, Patient/Caregiver Education & Training, Self-Care / ADL, Cognitive/Perceptual Training, Functional Mobility Training, Neuromuscular Re-education, Equipment Evaluation, Education, & procurement, Home Management Training, Endurance Training  G-Code     OutComes Score                                                  AM-PAC Score             Goals  Short term goals  Time Frame for Short term goals: 2 weeks  Short term goal 1: Pt will complete toilet transfer w/ Mod A-ongoing  Short term goal 2: Pt will complete LE dressing supine in bed w/ Min A- ongoing  Short term goal 3: Pt will complete UE dressing w/ Min A-goal met 5/25  Short term goal 4: Pt will complete bathing tasks w/ Min A-goal met 6/4  Short term goal 5: Pt will demonstrate improved functional use of LUE as evident by pt ability to open grooming containers I'ly-ongoing  Long term goals  Time Frame for Long term goals : 4 weeks- all goals ongoing  Long term goal 1: Pt will complete toilet transfer w/ CGA  Long term goal 2: Pt will complete toileting tasks w/ CGA  Long term goal 3: Pt will complete LE dressing (either supine or bed or seated ) w/ CGA  Long term goal 4: Pt will complete UE dressing w/ setup and spvn  Long term goal 5: Pt will complete bathing tasks w/ SBA  Patient Goals   Patient goals : \"be able to walk\"       Therapy Time   Individual Concurrent Group Co-treatment   Time In       0930   Time Out       1030   Minutes 60   Timed Code Treatment Minutes: 60 Minutes       Second Session Therapy Time:   Individual Concurrent Group Co-treatment   Time In        1130   Time Out        1220   Minutes        50     Timed Code Treatment Minutes:  50    Total Treatment Minutes:  60+50= [id number], OT

## 2021-06-08 NOTE — PLAN OF CARE
Problem: Falls - Risk of:  Goal: Will remain free from falls  Description: Will remain free from falls  Outcome: Ongoing   Pt. Remains at risk for falls due to impaired gait and weakness. Fall prevention measures ongoing: bed wheels locked and in lowest position, fall sign posted at the door, call light and over bed table within reach, prompt attention to the use of the call light. Pt. Will remain free from falls during this shift. Problem: Skin Integrity:  Goal: Will show no infection signs and symptoms  Description: Will show no infection signs and symptoms  Outcome: Ongoing   Skin is kept clean and dry, micotin powder applied to redness under right breast.  Colostomy bag changed, stoma pink and intact pt. Turned and repositioned Q 2 hrs and PRN. Nos/sx of infection noted. Problem: Pain:  Goal: Pain level will decrease  Description: Pain level will decrease  Outcome: Ongoing   Pt. Complaint of pain, pain medication administered, see MAR. Pain is being managed with pharmacological and non-pharmacological intervention. Pain level will be decreased or be at a satisfactory level by discharge.

## 2021-06-09 PROCEDURE — 97129 THER IVNTJ 1ST 15 MIN: CPT

## 2021-06-09 PROCEDURE — 97530 THERAPEUTIC ACTIVITIES: CPT | Performed by: PHYSICAL THERAPIST

## 2021-06-09 PROCEDURE — 97130 THER IVNTJ EA ADDL 15 MIN: CPT

## 2021-06-09 PROCEDURE — 6370000000 HC RX 637 (ALT 250 FOR IP): Performed by: PHYSICAL MEDICINE & REHABILITATION

## 2021-06-09 PROCEDURE — 97116 GAIT TRAINING THERAPY: CPT | Performed by: PHYSICAL THERAPIST

## 2021-06-09 PROCEDURE — 6360000002 HC RX W HCPCS: Performed by: PHYSICAL MEDICINE & REHABILITATION

## 2021-06-09 PROCEDURE — 97530 THERAPEUTIC ACTIVITIES: CPT

## 2021-06-09 PROCEDURE — 99231 SBSQ HOSP IP/OBS SF/LOW 25: CPT | Performed by: PHYSICAL MEDICINE & REHABILITATION

## 2021-06-09 PROCEDURE — 1280000000 HC REHAB R&B

## 2021-06-09 RX ADMIN — PANTOPRAZOLE SODIUM 40 MG: 40 TABLET, DELAYED RELEASE ORAL at 07:13

## 2021-06-09 RX ADMIN — POTASSIUM CHLORIDE 20 MEQ: 20 TABLET, EXTENDED RELEASE ORAL at 10:22

## 2021-06-09 RX ADMIN — CITALOPRAM HYDROBROMIDE 20 MG: 20 TABLET ORAL at 10:22

## 2021-06-09 RX ADMIN — DICLOFENAC SODIUM 2 G: 10 GEL TOPICAL at 10:28

## 2021-06-09 RX ADMIN — POLYETHYLENE GLYCOL 3350 17 G: 17 POWDER, FOR SOLUTION ORAL at 10:24

## 2021-06-09 RX ADMIN — ATORVASTATIN CALCIUM 40 MG: 40 TABLET, FILM COATED ORAL at 20:50

## 2021-06-09 RX ADMIN — LISINOPRIL 40 MG: 40 TABLET ORAL at 10:21

## 2021-06-09 RX ADMIN — MAGNESIUM OXIDE TAB 400 MG (240 MG ELEMENTAL MG) 400 MG: 400 (240 MG) TAB at 12:47

## 2021-06-09 RX ADMIN — MAGNESIUM OXIDE TAB 400 MG (240 MG ELEMENTAL MG) 400 MG: 400 (240 MG) TAB at 17:29

## 2021-06-09 RX ADMIN — ENOXAPARIN SODIUM 40 MG: 40 INJECTION SUBCUTANEOUS at 10:23

## 2021-06-09 RX ADMIN — MAGNESIUM OXIDE TAB 400 MG (240 MG ELEMENTAL MG) 400 MG: 400 (240 MG) TAB at 20:50

## 2021-06-09 RX ADMIN — Medication 2000 UNITS: at 10:22

## 2021-06-09 RX ADMIN — PREDNISONE 7.5 MG: 5 TABLET ORAL at 10:22

## 2021-06-09 RX ADMIN — DICLOFENAC SODIUM 2 G: 10 GEL TOPICAL at 20:49

## 2021-06-09 RX ADMIN — AMLODIPINE BESYLATE 5 MG: 5 TABLET ORAL at 10:21

## 2021-06-09 RX ADMIN — MICONAZOLE NITRATE: 20 POWDER TOPICAL at 20:51

## 2021-06-09 RX ADMIN — ASPIRIN 81 MG: 81 TABLET, CHEWABLE ORAL at 10:21

## 2021-06-09 RX ADMIN — MICONAZOLE NITRATE: 20 POWDER TOPICAL at 13:19

## 2021-06-09 RX ADMIN — MAGNESIUM OXIDE TAB 400 MG (240 MG ELEMENTAL MG) 400 MG: 400 (240 MG) TAB at 10:21

## 2021-06-09 RX ADMIN — FUROSEMIDE 20 MG: 20 TABLET ORAL at 10:22

## 2021-06-09 RX ADMIN — Medication 5 MG: at 20:50

## 2021-06-09 RX ADMIN — CARVEDILOL 25 MG: 12.5 TABLET, FILM COATED ORAL at 17:29

## 2021-06-09 RX ADMIN — LEVOTHYROXINE SODIUM 50 MCG: 0.05 TABLET ORAL at 07:13

## 2021-06-09 RX ADMIN — CARVEDILOL 25 MG: 12.5 TABLET, FILM COATED ORAL at 10:21

## 2021-06-09 ASSESSMENT — PAIN DESCRIPTION - PROGRESSION: CLINICAL_PROGRESSION: NOT CHANGED

## 2021-06-09 ASSESSMENT — PAIN DESCRIPTION - ORIENTATION: ORIENTATION: RIGHT;LEFT

## 2021-06-09 ASSESSMENT — PAIN SCALES - GENERAL
PAINLEVEL_OUTOF10: 0

## 2021-06-09 ASSESSMENT — PAIN DESCRIPTION - ONSET: ONSET: ON-GOING

## 2021-06-09 ASSESSMENT — PAIN - FUNCTIONAL ASSESSMENT: PAIN_FUNCTIONAL_ASSESSMENT: PREVENTS OR INTERFERES SOME ACTIVE ACTIVITIES AND ADLS

## 2021-06-09 ASSESSMENT — PAIN DESCRIPTION - LOCATION: LOCATION: KNEE

## 2021-06-09 ASSESSMENT — PAIN DESCRIPTION - DESCRIPTORS: DESCRIPTORS: SORE;ACHING

## 2021-06-09 ASSESSMENT — PAIN DESCRIPTION - FREQUENCY: FREQUENCY: INTERMITTENT

## 2021-06-09 NOTE — PLAN OF CARE
Problem: Falls - Risk of:  Goal: Will remain free from falls  Description: Will remain free from falls  6/9/2021 1103 by Tamanna Cabrera RN  Outcome: Ongoing  Note: Pt educated to use her call light when she needs assistance. Pt also educated not to get up unassisted at this time. Bed alarm on when Pt in bed and chair alarm on when Pt up in chair. Non skid socks on and call light placed within reach. RN will continue to monitor Pt.    6/8/2021 2149 by Dev Ward  Outcome: Ongoing  Note: Patient remains free of falls this shift.  Room free of clutter, bed in low position, call light in reach

## 2021-06-09 NOTE — FLOWSHEET NOTE
06/09/21 1307   Encounter Summary   Services provided to: Patient   Referral/Consult From: Rounding   Continue Visiting   (es 6/9)   Complexity of Encounter Moderate   Length of Encounter 15 minutes   Routine   Type Follow up   Assessment Approachable   Intervention Active listening;Prayer   Outcome Engaged in conversation;Receptive

## 2021-06-09 NOTE — PROGRESS NOTES
Patient assessment complete. Transferred to bed from wheelchair using the scoot pivot methid with gait belt and 2 staff. Patient was a max assist sand tolerated transfer fairly well. Patient has been incontinent X 1 and continent X1 of bladder. Scant amount of soft brown stool noted in colostomy bag. Patient denies pain or discomfort at this time. VSS and patient is afebrile. Bed in low position and call light is within reach.

## 2021-06-09 NOTE — PROGRESS NOTES
Physical Therapy  Facility/Department: [facility] ACUTE REHAB UNIT  Daily Treatment Note  NAME: Binu Monterroso  : 1945  MRN: [id number]    Date of Service: 2021    Discharge Recommendations:  24 hour supervision or assist, Home with Home health PT   PT Equipment Recommendations  Other: continue to assess, pt states that she owns a RW, recommend  light weight cong height 20\" w/c with flip back arm rests , 90 degree foot rests,  and a brake extension on the L. Pt is nonambulatory at this time and will have increased independence with mobility in home and community. Assessment   Body structures, Functions, Activity limitations: Decreased functional mobility ; Decreased safe awareness;Decreased balance;Decreased cognition;Decreased vision/visual deficit; Decreased endurance;Decreased strength;Decreased sensation;Decreased fine motor control  Assessment: Unfortunately, the lite gait was not effective with gt training for this pt. Pt still demo decreased overall endurance, poor L extrem proprioception and L hemiparesis. Pt maximizes effort with each session despite c/o severe fatigue. Pt is well below baseline and would benefit from cont therapy to maximize potential and increase functional obility towards Ind to allow for a safer d/c to home. Treatment Diagnosis: decreased functional mobility due to nontraumatic ICH  Decision Making: Medium Complexity  PT Education: General Safety; Energy Conservation;Transfer Training;Orientation; Adaptive Device Training;Functional Mobility Training  Activity Tolerance  Activity Tolerance: Patient limited by fatigue;Patient limited by endurance; Patient limited by pain  Activity Tolerance: Pt requests rests between therapy activities and was consistently reporting increased fatigue     Patient Diagnosis(es): There were no encounter diagnoses.      has a past medical history of Arthritis, CAD (coronary artery disease), Cancer ([facility] [address] Cerebral artery occlusion with cerebral infarction [facility]), CHF (congestive heart failure) ([facility] [address] Hypertension, and Thyroid disease. has a past surgical history that includes Abdomen surgery; Hysterectomy; Colonoscopy; hernia repair; and pacemaker placement. Restrictions  Position Activity Restriction  Other position/activity restrictions: fall precautions, as of 6/1/2021, pt is now untethered from wall in w/c on unit  Subjective   General  Chart Reviewed: Yes  Additional Pertinent Hx: Pt cont to maximize efforts whenever working with therapy. Pt still req a co treatment 2/2 to the inconsistencies with the transf. However, the past 2 days , pt has demonstrated improved overall ability to transf to commode and bed with focus of pt scooting vs a SPT. Pt's B knee pain, R hemiparesis and decreased proprioception are barriers to pt's progress. Pt is well below baseline and would benefit from cont therapy to maximize potential and increase functional mobility towards Ind to allow for a safer d/c to home. Family / Caregiver Present: No  Referring Practitioner: Marino Block MD  Subjective  Subjective: Pt reports that she is \"nervous \" about trying this \"contraption\"  General Comment  Comments: Pt sitting in w/c when PT arrived. Pt agreeable to therapy  Pain Screening  Patient Currently in Pain: Denies (Denies initially but when doing WB activities, pt c/o B knee pain with R> L)  Pain Assessment  Pain Location: Knee  Pain Orientation: Right;Left  Pain Descriptors: Sore;Aching  Pain Frequency: Intermittent  Pain Onset: On-going  Clinical Progression: Not changed  Functional Pain Assessment: Prevents or interferes some active activities and ADLs  Non-Pharmaceutical Pain Intervention(s): Ambulation/Increased Activity; Emotional support;Repositioned  Vital Signs  Patient Currently in Pain: Denies (Denies initially but when doing WB activities, pt c/o B knee pain with R> L)       Orientation  Orientation  Overall Orientation Status: Within Functional Limits (Pt presents with STM with inability to recall activities that occurred in past 24 hours.)  Cognition      Objective      Transfers  Sit to Stand: Dependent/Total;2 Person Assistance (STS x3  to an DIMITRI RW with pt req CGA x1 and Min A x1 for a dependent transf)  Stand to sit: Minimal Assistance (Min A and  VC for hand placement and to control descent to chair)  Squat Pivot Transfers: 2 Person Assistance (w/c > mat table req Min A x1 with pt doing a scooting technique leading consistently with the R. Mat > w/c req mod A x1)  Comment: Note, transf req additional time with each step sequenced to produce a successful transition. Pt is able to move all 4 extrem but has decreased proprioception on the L side with both LUE and LLE . Both VC and TC req for posistioning of L extrem in prep for transf. Ambulation  Ambulation?: Yes  More Ambulation?: No  Ambulation 1  Surface: level tile  Device:  (Lite Gait)  Other Apparatus: Wheelchair follow  Assistance: Dependent/Total  Quality of Gait: Note, LG attempted  multiple times with using 2 different sizes of harnesses to optimize WB in B LES. (an abdominal binder was used with additional padding to protect the colostomy. ). Unfortunately, neither option provided a successful option. Pt demo decreased BLE strength and decreased trunk strength resulting in pt flexing at both hips and knees. Pt had increased knee pain which also limited her abilities. Pt was able to advance both extrem in a walking pattern but most of pt's body weight was supported by the LG. Therapy also used an DIMITRI RW for increased WB in LES but pain resulted again limiting pt's ability to stand. Pt also c/o severe fatigue by the end of session  Distance: <10'  Comments: Extremely poor quality gt.  Stood 3x with the LG  Stairs/Curb  Stairs?: No  Wheelchair Activities  Wheelchair Size: 20\" cong height w/c  Wheelchair Cushion: Pressure Relieving  Level of Assistance for pressure relief activities: Modified independent  Wheelchair Time In       1030   Time Out       1200   Minutes       80           Yari Lab, [place]

## 2021-06-09 NOTE — PROGRESS NOTES
Pt awake in wheelchair. Physical assessment and vital signs as charted. Pt currently denies experiencing any pain at this time. Call light placed within reach. RN will continue to monitor Pt.

## 2021-06-09 NOTE — PLAN OF CARE
Problem: Falls - Risk of:  Goal: Will remain free from falls  Description: Will remain free from falls  Outcome: Ongoing  Note: Patient remains free of falls this shift. Room free of clutter, bed in low position, call light in reach     Problem: Falls - Risk of:  Goal: Absence of physical injury  Description: Absence of physical injury  Outcome: Ongoing  Note: Patient remains free of physical injury this shift. Bed/chair alarm in use     Problem: Skin Integrity:  Goal: Absence of new skin breakdown  Description: Absence of new skin breakdown  Outcome: Ongoing  Note: Patient remains free of new skin break down. Will continue to monitor.

## 2021-06-09 NOTE — PROGRESS NOTES
Occupational Therapy  Facility/Department: [facility] ACUTE REHAB UNIT  Daily Treatment Note  NAME: Vi Maynard  : 1945  MRN: [id number]    Date of Service: 2021    Discharge Recommendations:  Home with Home health OT, Home with nursing aide, 24 hour supervision or assist, Continue to assess pending progress  OT Equipment Recommendations  ADL Assistive Devices: Transfer Tub Bench;Long-handled Sponge;Grab Bars - shower;Grab Bars - toilet  Other: Pt may benefit from a hospital bed pending progress. Pt will benefit from a cong height 20'' wide w/c. Assessment   Performance deficits / Impairments: Decreased functional mobility ; Decreased cognition;Decreased ADL status; Decreased endurance;Decreased fine motor control;Decreased ROM; Decreased sensation;Decreased coordination;Decreased strength;Decreased balance;Decreased posture;Decreased safe awareness  Assessment: Pt engaged in lite gait activity today in attempt to promote weight bearing and functional mobility however the lite gait was somewhat unsuccessful due to significant BLE weakness and contracutres as well as trunk weakness limiting pt ability to achieve upright posture. Pt also used the Marquez Alfaro walker to promote stance which was more successful as pt was able to stand w/ only CGAx2 however pt again could not sustain stance due to BLE weakness. Pt is highly motivated and she has made good progress in therapy thus far. Pt continues to benefit from OT in order to maximize functional independence.  Cont OT per POC  Treatment Diagnosis: Decreased ADL status and decreased ROM 2/2 CVA    OT Education: Transfer Training  Patient Education: lite gait and harness, ruth walker- pt verb understanding but benefits from reinforcement  REQUIRES OT FOLLOW UP: Yes  Activity Tolerance  Activity Tolerance: Patient Tolerated treatment well;Patient limited by fatigue  Activity Tolerance: Pt tolerated session well but c/o fatigue throughout  Safety Devices  Safety Devices in place: Yes  Type of devices: Call light within reach; Chair alarm in place; Left in chair;Nurse notified         Patient Diagnosis(es): There were no encounter diagnoses. has a past medical history of Arthritis, CAD (coronary artery disease), Cancer ([facility] [address] Cerebral artery occlusion with cerebral infarction [facility]), CHF (congestive heart failure) ([facility] [address] Hypertension, and Thyroid disease. has a past surgical history that includes Abdomen surgery; Hysterectomy; Colonoscopy; hernia repair; and pacemaker placement. Restrictions  Position Activity Restriction  Other position/activity restrictions: fall precautions, as of 6/1/2021, pt is now untethered from wall in w/c on unit  Subjective   General  Chart Reviewed: Yes  Patient assessed for rehabilitation services?: Yes  Additional Pertinent Hx: Per MD CHAVARRIA and P:Patient is a 68 yo female who originally admitted to [facility] /23/21-2/9/21 for Right intracerebral hemorrhage with L hemiparesis. She has a know past medical hx of systolic CHF (EF 69%), A Fib (on warfarin), ICD, HTN, Hypothyroidism, rectal cancer s/p resection, and ITP. Her hemorrhage became worse with a midline shift and decline in her neurologic status. Patient was started on seizure prophalxis, and neurosurgery followed, but no indication for surgery. She was also treated for UTI, anti-hypertensives adjusted to maintain bp in normal range  with ICH. She failed MBS and GI consulted who recommended PEG. She was also treated with zosyn for possible aspiration pneumonia. S/P PEG placed on 2/6/21. EGD recommended PPI therapy. She remained stable and was discharged to [facility] at [facility] [place] [facility]". Patient presented to SNF on 2/9/21. She improved with her swallowing and tolerated a regular diet,  and GI removed PEG on 5/18/21. Pt had been at the [facility] since February and has now exhausted her days left at the SNF.  Pt admitted to ARU 5/21  Family / Caregiver Present: No  Referring Practitioner: Dr. Charly Gaona  Diagnosis: CVA Subjective  Subjective: Pt was seated in w/c upon arrival. Pt reported being tired today but was pleasant and agreeable to OT/PT. Co treat indicated to maximize functional independence. General Comment  Comments: Pt's daughter Francine Pina provided OT and PT w/ measurements of home in order to practice transfers according to home setup. Measurement are as follows: Bed height- 26''. Door frames- 33''. Floor to top of toilet- 16''. Floor to couch-17.5''. Shower step up- 6''    Vital Signs  Patient Currently in Pain: Denies   Orientation     Objective             Balance  Sitting Balance: Stand by assistance  Standing Balance: Dependent/Total  Standing Balance  Time: ~8 mins total  Activity: Lite Gait, Debra walker  Comment: The lite gait was utilized today in order to promote weight bearing through BLEs and for improved functional mobility. OT and PT assisted w/ placement of harness while pt maintained stance in the Renee Proffer. Once the harness was properly fastened pt was assisted back into the w/c and she was lifted into a standing position in the lite gait. Increased time was spent adjusting the harness and even using a new harness size for optimal fit. Pt maintained stance in the lite gait for ~8 mins total w/ 3 rest breaks needed. Pt attempted to achieve upright posture using BUEs on the handles however pt had significant difficulty w/ achieving erect posture. Max VCs and TCs were provided to promote hip extension and B knee extension. Pt was in a very flexed position in the lite gait and she could not coordinate movement to successfully take steps forward. Pt did move her feet forward and backward several times however her posture was not ideal in order to ambulate successfully. As an alternative to the lite gait, pt was assisted to the EOM to use the [place] walker.  Pt sat at [facility] and completed 3 sit to stand transfers, sustaining stance at Rhiannon walker for ~20s total. Pt again was very flexed at the hips and knees and she could not achieve upright posture. Pt's BUEs were supported on forearm troughs on Debra walker which did appear to help however pt c/o B knee weakness which limited time in stance. Pt only required CGA x 2 in stance at [facility] walker but due to fatigue and pain pt was assisted back to the EOM. Functional Mobility  Functional - Mobility Device: Wheelchair  Activity: To/From therapy gym  Assist Level: Modified independent        Transfers  Sit Pivot Transfers: Moderate assistance (Min A from w/c > EOM; Mod A from EOM > w/c. Max VCs needed to sequence transfer successfully)  Sit to stand: Dependent/Total (Max Ax2 from w/c to [address] stedy. CGA x2 from EOM to debra walker)  Stand to sit: Maximum assistance (Max A needed from [address] stedy to w/c. Min A from [facility] walker to [employer])                         Cognition  Arousal/Alertness: Appropriate responses to stimuli  Following Commands: Follows multistep commands with increased time; Follows multistep commands with repitition  Attention Span: Attends with cues to redirect  Memory: Decreased recall of recent events;Decreased short term memory  Safety Judgement: Decreased awareness of need for assistance  Problem Solving: Assistance required to generate solutions  Insights: Fully aware of deficits  Initiation: Requires cues for some  Sequencing: Requires cues for some       Perception  Unilateral Attention: Cues to attend left visual field;Cues to attend to left side of body  Initiation: Cues to initiate tasks                                   Plan   Plan  Times per week: 5x a week for 75 mins daily  Times per day: Daily  Current Treatment Recommendations: Strengthening, Wheelchair Mobility Training, Positioning, ROM, Safety Education & Training, Balance Training, Patient/Caregiver Education & Training, Self-Care / ADL, Cognitive/Perceptual Training, Functional Mobility Training, Neuromuscular Re-education, Equipment Evaluation, Education, & procurement, Home Management Training, Endurance Training  G-Code     OutComes Score                                                  AM-PAC Score             Goals  Short term goals  Time Frame for Short term goals: 2 weeks  Short term goal 1: Pt will complete toilet transfer w/ Mod A-ongoing  Short term goal 2: Pt will complete LE dressing supine in bed w/ Min A- ongoing  Short term goal 3: Pt will complete UE dressing w/ Min A-goal met 5/25  Short term goal 4: Pt will complete bathing tasks w/ Min A-goal met 6/4  Short term goal 5: Pt will demonstrate improved functional use of LUE as evident by pt ability to open grooming containers I'ly-ongoing  Long term goals  Time Frame for Long term goals : 4 weeks- all goals ongoing  Long term goal 1: Pt will complete toilet transfer w/ CGA  Long term goal 2: Pt will complete toileting tasks w/ CGA  Long term goal 3: Pt will complete LE dressing (either supine or bed or seated ) w/ CGA  Long term goal 4: Pt will complete UE dressing w/ setup and spvn  Long term goal 5: Pt will complete bathing tasks w/ SBA  Patient Goals   Patient goals : \"be able to walk\"       Therapy Time   Individual Concurrent Group Co-treatment   Time In      1030   Time Out      1200   Minutes      90   Timed Code Treatment Minutes: 90 Minutes       Keiry Blake, OT

## 2021-06-09 NOTE — PATIENT CARE CONFERENCE
Inpatient Rehabilitation  Weekly Team Conference Note  The [address]  [phone]  Patient Name: Sagrario Mendoza        MRN: [id number]    : 1945  (67 y.o.)  Gender: female   Referring Practitioner: Tuan Santos MD  Diagnosis: nontraumatic ICH  The team conference for this patient was held on 6/10/2021 at 10:30am by:  Michele Riggs DO    Current/Goal QM SCORES  QM Current/Goal Score   Eating CARE Score: 5 / Discharge Goal: Independent   Oral Hygiene CARE Score: 5 / Discharge Goal: Independent   Shower/Bathing CARE Score: 3 / Discharge Goal: Supervision or touching assistance   UB Dressing CARE Score: 3 / Discharge Goal: Supervision or touching assistance   LB Dressing CARE Score: 2 / Discharge Goal: Supervision or touching assistance   Putting on/off Footwear CARE Score: 2 / Discharge Goal: Supervision or touching assistance   Toileting Hygiene CARE Score: 1 / Discharge Goal: Supervision or touching assistance   Bladder Continence Bladder Continence: Incontinent daily    Bowel Continence Bowel Continence: Not rated (colostomy)    Toilet Transfers CARE Score: 1 / Discharge Goal: Supervision or touching assistance   Shower/Bathe Self  CARE Score: 3 / Discharge Goal: Supervision or touching assistance   Rolling Left and Right CARE Score: 3 / Discharge Goal: Supervision or touching assistance   Sit to Lying CARE Score: 3 / Discharge Goal: Supervision or touching assistance   Lying to Sitting on Bedside CARE Score: 3 / Discharge Goal: Supervision or touching assistance   Sit to Stand CARE Score: 1 / Discharge Goal: Partial/moderate assistance   Chair/Bed to Chair Transfer CARE Score: 1 / Discharge Goal: Supervision or touching assistance   Car Transfers CARE Score: 1 / Discharge Goal: Partial/moderate assistance   Walk 10 Feet CARE Score: 9 / Discharge Goal: Not Applicable   Walk 50 Feet with Two Turns CARE Score: 9 / Discharge Goal: Not Applicable   Walk 418 Feet CARE Score: 9 / Discharge Goal: Not Applicable   Walk 10 Feet on Uneven Surfaces CARE Score: 9 / Discharge Goal: Not Applicable   1 Step (Curb) CARE Score: 9 / Discharge Goal: Not Applicable   4 Steps CARE Score: 9 / Discharge Goal: Not Applicable   12 Steps CARE Score: 9 / Discharge Goal: Not Applicable   Picking up Object from Floor CARE Score: 88 / Discharge Goal: Independent   Wheel 50 Feet with 2 Turns CARE Score: 6 / Discharge Goal: Independent   Type         [x] Manual        [] Motorized        [] N/A   Wheel 150 Feet CARE Score: 6 / Discharge Goal: Independent   Type         [x] Manual        [] Motorized        [] N/A     NURSING:  A&Ox: Level of Consciousness: Alert (0)  Orientation Level: Oriented X4, Oriented to place, Oriented to time, Oriented to situation, Oriented to person  [place] Risk Score: Score: 35  Admission BIMS: 9  Wounds/Incisions/Ulcers: Colostomy to LLQ, healed peg tube site to mid upper abdomen, redness under right breast, scattered bruising and abrasion, mild redness to mariyln area and buttocks. Medication Review: Yes  Pain: Chronic bilateral knee pain being treated with Tylenol, voltaren, and non-pharmaceutical intervention. Consultations: None   Imaging: None  No orders to display     Active Comorbid Conditions: CAD, Hx of rectal cancer, CHF, HTN,   Systems Review:   Renal: X  (urinary frequency and incontinent at times) Dialysis: No Type: N/A, Frequency: N/A  Neurological: X  Musculoskeletal: X  Respiratory: X  Cardiac/Circulatory/Peripheral Vascular: X  Abnormal/Relevant Test Results:   Abnormal/Relevant Lab Values:   CBC:  Recent Labs     06/10/21  0548   WBC 5.4   HGB 11.6*   HCT 34.3*   MCV 90.9        BMP:   Recent Labs     06/10/21  0548      K 4.2      CO2 27   BUN 11   CREATININE <0.5*     PT/INR: No results for input(s): PROTIME, INR in the last 72 hours. APTT: No results for input(s): APTT in the last 72 hours.   Liver Profile:  No results found for: AST, ALT, ALB, BILIDIR, BILITOT, ALKPHOS, GGT, 5NUCNo results found for: CHOL, HDL, TRIG  Recent Labs     06/10/21  0548   WBC 5.4   HGB 11.6*   HCT 34.3*      MCV 90.9     Recent Labs     06/10/21  0548      K 4.2      CO2 27   BUN 11   CREATININE <0.5*     No results for input(s): AST, ALT, ALB, BILIDIR, BILITOT, ALKPHOS in the last 72 hours. No results for input(s): MG in the last 72 hours. Recent Labs     06/10/21  0548   WBC 5.4   HGB 11.6*   HCT 34.3*        Recent Labs     06/10/21  0548      K 4.2      CO2 27   BUN 11   CREATININE <0.5*   CALCIUM 9.3     No results for input(s): AST, ALT, BILIDIR, BILITOT, ALKPHOS in the last 72 hours. No results for input(s): INR in the last 72 hours. No results for input(s): Lyn Ambika in the last 72 hours. PHYSICAL THERAPY:  Bed Mobility: Scooting: Dependent/Total (In caudal> cephalo pt was dependent 2/2 to pt's fatigue level. Used rosalinda pad with pt pushing with BLES from a bridge position)    Transfers:  Sit to Stand: Dependent/Total, 2 Person Assistance (STS x3  to an DIMITRI RW with pt req CGA x1 and Min A x1 for a dependent transf)  Stand to sit: Minimal Assistance (Min A and  VC for hand placement and to control descent to chair)  Bed to Chair: Dependent/Total (HIgher > lower with Min A x2/)  Squat Pivot Transfers: 2 Person Assistance (w/c > mat table req Min A x1 with pt doing a scooting technique leading consistently with the R. Mat > w/c req mod A x1)  Comment: PT/OT assisted pt with toileting. Refer to OT note for pt's status. PT assisted with pt standing using the GB and mod A x1. WB Status: Pt is not safe for ambulation at this time)  Ambulation 1  Surface: level tile  Device:  (Lite Gait)  Other Apparatus: Wheelchair follow  Assistance: Dependent/Total  Quality of Gait: Note, LG attempted  multiple times with using 2 different sizes of harnesses to optimize WB in B LES.  (n abdominal binder was used with additional padding to protect the colostomy. ). Unfortunately, neither option provided a successful option. Pt demo decreased BLE strength andnd decreased trunk strength resulting in pt flexing at both hips and knees. Pt had increased knee pain which also limited her abilities. Pt was able to advance both extrem in a walking pattern but most of pt's body weight was supported by the LG. Therapy also used an DIMITRI RW for increased WB in LES but pain resulted again imiting pt's ability to stand. Pt also c/o severe fatigue by the end of session  Distance: <10'  Comments: Extremely poor quality gt    OCCUPATIONAL THERAPY:  ADL  Feeding: Setup (Assistance needed to open containers)  Grooming: Setup (Pt washed face seated and combed hair seated on TTB w/ setup assist)  UE Bathing: Setup, Supervision (Pt washed 4/4 components of UE bathing seated on TTB w/ spvn. Pt w/ some decreased thoroughness w/ LUE due to weak grasp however pt was able to complete to her satisfaction.)  LE Bathing: Setup, Verbal cueing, Increased time to complete, Minimal assistance (Pt used LH sponge seated on TTB to wash BLEs. Pt was able to wash her marilyn area but required min A to wash/dry buttocks.)  UE Dressing: Setup, Verbal cueing, Increased time to complete, Minimal assistance (Pt required min A to thread LUE into tshirt successfully.)  LE Dressing: Setup, Verbal cueing, Increased time to complete, Maximum assistance (Pt was able to thread BLEs into underwear seated in w/c but required min A to thread pants. Pt was able to pull clothes over hips on the R sidelying in bed but required assistance on the L. Assist needed to don socks. Pt was able to step into shoes)  Toileting: Dependent/Total (Assist x2 needed in stance to manage clothing up and down)  Additional Comments: ADLs completed w/ above listed levels of assistance needed. Pt was able to doff her shoes and socks seated on TTB today w/o assistance from OT.  Pt also demonstrated increased independence w/ bathing today and maintained seated balance on TTB w/ SBA-spvn. Pt was very fatigued w/ shower and dressing tasks and required increased assistance w/ transfers at end of session as a result. Toilet Transfers: Toilet Transfers  Toilet - Technique: Sit pivot  Equipment Used: Standard toilet  Toilet Transfer: Dependent/Total  Toilet Transfers Comments: w/c <> toilet completed w/ Min Ax2. VCs needed for technique    Tub/ShowerTransfers:  Shower Transfers  Shower - Transfer From: Wheelchair  Shower - Transfer Type: To and From  Shower - Transfer To: Transfer tub bench  Shower - Technique: Sit pivot  Shower Transfers: Dependent, 2 Person assistance  Shower Transfers Comments: Scoot pivot completed from w/c <> TTB w/ Min Ax2. Min VCs needed for technique    SPEECH THERAPY:  Assessment: Memory impairment    NUTRITION:  Please see nutrition note for details. Weight: 174 lb 2.6 oz (79 kg) / Body mass index is 31.85 kg/m². Current diet order: DIET GENERAL;  Dietary Nutrition Supplements: Standard High Calorie Oral Supplement         Feeding: Able to feed self  Room Service: Selective   NSG Recorded PO: PO Meals Eaten (%): 76 - 100% PO Supplement (%): 51 - 75%  Malnutrition Status Malnutrition Status: At risk for malnutrition (Comment)    CASE MANAGEMENT:  Psychosocial/Behavioral Issues: none noted  Assessment:  Referred to patient for d/c planning. Patient continues to plan to return home with  and daughters to assist.  Plan home with home care and wheelchair. Referral made to [facility]. Await response. Will continue to follow for d/c needs.     Patient/Family Education provided by team:  Role of PT/OT, encoding strategies, influence of attention on memory, recommendation for routine/repetition to compensate for memory impairments, LUE attention, stroke recovery, increased independence w/ ADLs     Patient Goals for Rehab stay:  1. be able to walk      Rehab Team Goals for patient for the Upcoming Week:  1. increase independence w/ ADLs   2. Increase independence w/ functional transfers     Barriers to Progress/Attainment of Goals & Efforts/Interventions to remove Barriers:  1. knee pain- continue voltaren     Discharge Plan:  Estimated Length of Stay: 23 days  Destination: home health and discharge home with supervision  Services at Discharge: [address], Occupational Therapy, Speech Therapy and Nursing 3x week  Equipment at Discharge: wheelchair, TTB, LH sponge, bed rails,   Community Resources:   Factors facilitating achievement of predicted outcomes: Family support, Motivated, Cooperative, Pleasant, Sense of humor and Good insight into deficits  Barriers to the achievement of predicted outcomes: Cognitive deficit, Upper extremity weakness and Lower extremity weakness    Special Needs in the Upcoming Week:   [x] Family/Caregiver Education  [] Home visit  []Therapeutic Pass   [] Equipment  Type:   [] Consults:_______   [] Family/Caregiver Training    [] Stroke Risk Factor Education     [] Other;_______     TEAM SUMMARY: Will continue with current poc & goals until anticipated d/c date of 6/12/2021. MD:   Stroke Risk Factors:   [] N/A for this patient [x] HTN  []  Diabetes  [] Hyperlipidemia  []Obesity BMI >25  [x] Atrial Fibrillation [] Smoker (current)  [] Smoker (quit in last 12 months)  [] Sleep Apnea  [x] Other:  On anticoagulation therapy    Risk for Readmission: Moderate - 12%    Justification for Continued Stay:   Criteria for continued IRF stay:  Based on my medical assessment of the patient and review of information from the interdisciplinary team, as part of this weekly team conference, the patient continues to meet the following criteria for IRF level of care:  [x] The patient requires 24-hour rehabilitation nursing care   [x] The patient requires an intensive rehabilitation therapy program  [x] The patient requires active and ongoing

## 2021-06-09 NOTE — PROGRESS NOTES
ACUTE REHAB UNIT  SPEECH/LANGUAGE PATHOLOGY      [x] Daily  [] Weekly Care Conference Note  [] Discharge    Patient: Kim Rae      :1945  YSK:[id number]  Rehab Dx/Hx: 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, unspecified ([facility] [address] [I61.2]    Precautions: [] Aspiration  [x] Fall risk  [] Sternal  [] Seizure [] Hip  [] Weight Bearing [] Other  ST Dx: [] Aphasia  [] Dysarthria  [] Apraxia   [] Oropharyngeal dysphagia [x] Cognitive Impairment  [] Other:   Date of Admit: 2021  Room #: 3104/3104-01  Date: 2021          Current Diet Order:DIET GENERAL;  Dietary Nutrition Supplements: Standard High Calorie Oral Supplement   Vision  Vision: Impaired  Vision Exceptions: Wears glasses at all times  Hearing  Hearing: Within functional limits     Comments: Per admitting H&P (2021): 'Patient is a 66 yo female who originally admitted to hospital /-21 for Right intracerebral hemorrhage with L hemiparesis. She has a know past medical hx of systolic CHF (EF 79%), A Fib (on warfarin), ICD, HTN, Hypothyroidism, rectal cancer s/p resection, and ITP. Her hemorrhage became worse with a midline shift and decline in her neurologic status. Patient was started on seizure prophalxis, and neurosurgery followed, but no indication for surgery. She was also treated for UTI, anti-hypertensives adjusted to maintain bp in normal range  with ICH. She failed MBS and GI consulted who recommended PEG. She was also treated with zosyn for possible aspiration pneumonia. S/P PEG placed on 21. EGD recommended PPI therapy. She remained stable and was discharged to [facility]. Patient presented to SNF on 21. She improved with her swallowing and tolerated a regular diet,  and GI removed PEG on 21. Patient has made significant progress in her alertness and activity tolerance, and it is felt that she would benefit from and could tolerate intensive inpatient rehab unit treatment at this time.   Prior to reach  [x] Chair alarm activated and connected to nurse call light system  [] Bed alarm activated   [] Other     Assessment:  Memory impairment   Plan: Continue per plan of care.      Interventions used this date:  [] Speech/Language Treatment  [] Instruction in HEP  [] Dysphagia Treatment [x] Cognitive Treatment   [] Other:  Discharge recommendations:  [] Home independently  [x] Home with assistance []  24 hour supervision  [] ECF [] Other  Continued Tx Upon Discharge: ? [] Yes    [] No    [x] TBD based on progress while on ARU     [] Vital Stim indicated     [] Other:   Estimated discharge date: 6/12/21    Dana Vang, Eulogio  Speech-Language Pathologist

## 2021-06-10 LAB
ANION GAP SERPL CALCULATED.3IONS-SCNC: 8 MMOL/L (ref 3–16)
BASOPHILS ABSOLUTE: 0.1 K/UL (ref 0–0.2)
BASOPHILS RELATIVE PERCENT: 1.1 %
BUN BLDV-MCNC: 11 MG/DL (ref 7–20)
CALCIUM SERPL-MCNC: 9.3 MG/DL (ref 8.3–10.6)
CHLORIDE BLD-SCNC: 101 MMOL/L (ref 99–110)
CO2: 27 MMOL/L (ref 21–32)
CREAT SERPL-MCNC: <0.5 MG/DL (ref 0.6–1.2)
EOSINOPHILS ABSOLUTE: 0.2 K/UL (ref 0–0.6)
EOSINOPHILS RELATIVE PERCENT: 3.1 %
GFR AFRICAN AMERICAN: >60
GFR NON-AFRICAN AMERICAN: >60
GLUCOSE BLD-MCNC: 82 MG/DL (ref 70–99)
HCT VFR BLD CALC: 34.3 % (ref 36–48)
HEMOGLOBIN: 11.6 G/DL (ref 12–16)
LYMPHOCYTES ABSOLUTE: 0.8 K/UL (ref 1–5.1)
LYMPHOCYTES RELATIVE PERCENT: 14.6 %
MCH RBC QN AUTO: 30.9 PG (ref 26–34)
MCHC RBC AUTO-ENTMCNC: 34 G/DL (ref 31–36)
MCV RBC AUTO: 90.9 FL (ref 80–100)
MONOCYTES ABSOLUTE: 0.5 K/UL (ref 0–1.3)
MONOCYTES RELATIVE PERCENT: 10 %
NEUTROPHILS ABSOLUTE: 3.8 K/UL (ref 1.7–7.7)
NEUTROPHILS RELATIVE PERCENT: 71.2 %
PDW BLD-RTO: 14.7 % (ref 12.4–15.4)
PLATELET # BLD: 166 K/UL (ref 135–450)
PMV BLD AUTO: 7.7 FL (ref 5–10.5)
POTASSIUM SERPL-SCNC: 4.2 MMOL/L (ref 3.5–5.1)
RBC # BLD: 3.77 M/UL (ref 4–5.2)
SODIUM BLD-SCNC: 136 MMOL/L (ref 136–145)
WBC # BLD: 5.4 K/UL (ref 4–11)

## 2021-06-10 PROCEDURE — 80048 BASIC METABOLIC PNL TOTAL CA: CPT

## 2021-06-10 PROCEDURE — 1280000000 HC REHAB R&B

## 2021-06-10 PROCEDURE — 6360000002 HC RX W HCPCS: Performed by: PHYSICAL MEDICINE & REHABILITATION

## 2021-06-10 PROCEDURE — 97130 THER IVNTJ EA ADDL 15 MIN: CPT

## 2021-06-10 PROCEDURE — 6370000000 HC RX 637 (ALT 250 FOR IP): Performed by: PHYSICAL MEDICINE & REHABILITATION

## 2021-06-10 PROCEDURE — 97129 THER IVNTJ 1ST 15 MIN: CPT

## 2021-06-10 PROCEDURE — 97530 THERAPEUTIC ACTIVITIES: CPT

## 2021-06-10 PROCEDURE — 85025 COMPLETE CBC W/AUTO DIFF WBC: CPT

## 2021-06-10 PROCEDURE — 97535 SELF CARE MNGMENT TRAINING: CPT

## 2021-06-10 PROCEDURE — 99231 SBSQ HOSP IP/OBS SF/LOW 25: CPT | Performed by: PHYSICAL MEDICINE & REHABILITATION

## 2021-06-10 PROCEDURE — 36415 COLL VENOUS BLD VENIPUNCTURE: CPT

## 2021-06-10 RX ADMIN — CARVEDILOL 25 MG: 12.5 TABLET, FILM COATED ORAL at 17:10

## 2021-06-10 RX ADMIN — LEVOTHYROXINE SODIUM 50 MCG: 0.05 TABLET ORAL at 07:07

## 2021-06-10 RX ADMIN — LISINOPRIL 40 MG: 40 TABLET ORAL at 09:10

## 2021-06-10 RX ADMIN — MICONAZOLE NITRATE: 20 POWDER TOPICAL at 07:39

## 2021-06-10 RX ADMIN — MICONAZOLE NITRATE: 20 POWDER TOPICAL at 20:33

## 2021-06-10 RX ADMIN — MAGNESIUM OXIDE TAB 400 MG (240 MG ELEMENTAL MG) 400 MG: 400 (240 MG) TAB at 20:33

## 2021-06-10 RX ADMIN — PREDNISONE 7.5 MG: 5 TABLET ORAL at 09:11

## 2021-06-10 RX ADMIN — PANTOPRAZOLE SODIUM 40 MG: 40 TABLET, DELAYED RELEASE ORAL at 07:07

## 2021-06-10 RX ADMIN — MAGNESIUM OXIDE TAB 400 MG (240 MG ELEMENTAL MG) 400 MG: 400 (240 MG) TAB at 17:10

## 2021-06-10 RX ADMIN — Medication 5 MG: at 20:33

## 2021-06-10 RX ADMIN — MAGNESIUM OXIDE TAB 400 MG (240 MG ELEMENTAL MG) 400 MG: 400 (240 MG) TAB at 12:59

## 2021-06-10 RX ADMIN — FUROSEMIDE 20 MG: 20 TABLET ORAL at 09:11

## 2021-06-10 RX ADMIN — ASPIRIN 81 MG: 81 TABLET, CHEWABLE ORAL at 09:10

## 2021-06-10 RX ADMIN — ATORVASTATIN CALCIUM 40 MG: 40 TABLET, FILM COATED ORAL at 20:33

## 2021-06-10 RX ADMIN — DICLOFENAC SODIUM 2 G: 10 GEL TOPICAL at 07:39

## 2021-06-10 RX ADMIN — DICLOFENAC SODIUM 2 G: 10 GEL TOPICAL at 20:33

## 2021-06-10 RX ADMIN — MAGNESIUM OXIDE TAB 400 MG (240 MG ELEMENTAL MG) 400 MG: 400 (240 MG) TAB at 09:09

## 2021-06-10 RX ADMIN — POTASSIUM CHLORIDE 20 MEQ: 20 TABLET, EXTENDED RELEASE ORAL at 09:10

## 2021-06-10 RX ADMIN — CARVEDILOL 25 MG: 12.5 TABLET, FILM COATED ORAL at 09:10

## 2021-06-10 RX ADMIN — ENOXAPARIN SODIUM 40 MG: 40 INJECTION SUBCUTANEOUS at 09:12

## 2021-06-10 RX ADMIN — POLYETHYLENE GLYCOL 3350 17 G: 17 POWDER, FOR SOLUTION ORAL at 09:12

## 2021-06-10 RX ADMIN — Medication 2000 UNITS: at 09:11

## 2021-06-10 RX ADMIN — CITALOPRAM HYDROBROMIDE 20 MG: 20 TABLET ORAL at 09:10

## 2021-06-10 RX ADMIN — AMLODIPINE BESYLATE 5 MG: 5 TABLET ORAL at 09:11

## 2021-06-10 ASSESSMENT — PAIN SCALES - GENERAL
PAINLEVEL_OUTOF10: 0

## 2021-06-10 NOTE — PROGRESS NOTES
Occupational Therapy  Facility/Department: [facility] ACUTE REHAB UNIT  Daily Treatment Note  NAME: Becka Sams  : 1945  MRN: [id number]    Date of Service: 6/10/2021    Discharge Recommendations:  Home with Home health OT, Home with nursing aide, 24 hour supervision or assist, Continue to assess pending progress  OT Equipment Recommendations  ADL Assistive Devices: Transfer Tub Bench;Long-handled Sponge;Grab Bars - shower;Grab Bars - toilet  Other: Pt will benefit from a cong height 20'' wide w/c. Assessment   Performance deficits / Impairments: Decreased functional mobility ; Decreased cognition;Decreased ADL status; Decreased endurance;Decreased fine motor control;Decreased ROM; Decreased sensation;Decreased coordination;Decreased strength;Decreased balance;Decreased posture;Decreased safe awareness  Assessment: Family training was completed today w/ pt's daughter Osbaldo Dominguez and  Maty Glez. Family participated w/ functional transfer training and witnessed transfers including to/from toilet and to/from the bed. Increased time was spent addressing questions and educating family how to provide proper cues. Pt and family verbalized understanding of all education and felt confident w/ training. Pt continues to require assist x2 at times for transfers due to safety concerns. Pt continues to make good progress and benefits from ongoing OT. Cont OT per POC  Treatment Diagnosis: Decreased ADL status and decreased ROM 2/2 CVA    OT Education: ADL Adaptive Strategies;Transfer Training;Family Education  Patient Education: Increased time spent addressing family's questions and concerns for d/c. Increased time spent training family w/ transfers, bed mobility, ADL explanation, equipment needs, and explanatio regarding [facility].  Pt and family verb understanding of education  REQUIRES OT FOLLOW UP: Yes  Activity Tolerance  Activity Tolerance: Patient Tolerated treatment well  Safety Devices  Safety Devices in place: Yes  Type of devices: Chair alarm in place;Call light within reach; Left in chair;Nurse notified         Patient Diagnosis(es): There were no encounter diagnoses. has a past medical history of Arthritis, CAD (coronary artery disease), Cancer ([facility] [address] Cerebral artery occlusion with cerebral infarction [facility]), CHF (congestive heart failure) ([facility] [address] Hypertension, and Thyroid disease. has a past surgical history that includes Abdomen surgery; Hysterectomy; Colonoscopy; hernia repair; and pacemaker placement. Restrictions  Position Activity Restriction  Other position/activity restrictions: fall precautions, as of 6/1/2021, pt is now untethered from wall in w/c on unit  Subjective   General  Chart Reviewed: Yes  Patient assessed for rehabilitation services?: Yes  Additional Pertinent Hx: Per MD CHAVARRIA and P:Patient is a 68 yo female who originally admitted to \[facility]\"" /23/21-2/9/21 for Right intracerebral hemorrhage with L hemiparesis. She has a know past medical hx of systolic CHF (EF 95%), A Fib (on warfarin), ICD, HTN, Hypothyroidism, rectal cancer s/p resection, and ITP. Her hemorrhage became worse with a midline shift and decline in her neurologic status. Patient was started on seizure prophalxis, and neurosurgery followed, but no indication for surgery. She was also treated for UTI, anti-hypertensives adjusted to maintain bp in normal range  with ICH. She failed MBS and GI consulted who recommended PEG. She was also treated with zosyn for possible aspiration pneumonia. S/P PEG placed on 2/6/21. EGD recommended PPI therapy. She remained stable and was discharged to [facility]. Patient presented to SNF on 2/9/21. She improved with her swallowing and tolerated a regular diet,  and GI removed PEG on 5/18/21. Pt had been at the [facility] since [facility] and has now exhausted her days left at the SNF.  Pt admitted to ARU 5/21  Family / Caregiver Present: Yes (daughter and )  Referring Practitioner: Dr. Marti Veras  Diagnosis: CVA Subjective  Subjective: Pt was seated in w/c upon arrival w/ daughter Osito Peñaloza and  Tonia Wilkins present for family training. Pt was pleasant and agreeable to OT/PT co treat. Co treat indicated to maximize functional independence. General Comment  Comments: Pt's daughter Kristin Zheng provided OT and PT w/ measurements of home in order to practice transfers according to home setup. Measurement are as follows: Bed height- 26''. Door frames- 33''. Floor to top of toilet- 16''. Floor to couch-17.5''. Shower step up- 6''    Vital Signs  Patient Currently in Pain: Denies   Orientation     Objective    ADL  Toileting: Dependent/Total (Assist x2 needed in stance at [facility] for pants management. Pt completed marilyn care seated I'ly)  Additional Comments: Family training completed today to prepare for d/c on 6/12. OT educated family that dressing tasks would likely be best performed in the bed as this allows pt to participate more successfully w/ ADLs vs attempting to stand. OT explained that bed ADLs also reduce caregiver burden since assist x2 is needed if stance is attempted. Balance  Sitting Balance: Supervision  Standing Balance: Dependent/Total  Standing Balance  Time: 1 min total  Activity: stance for pants management at   Functional Mobility  Functional - Mobility Device: Wheelchair  Activity: To/From therapy gym; To/from bathroom  Assist Level: Modified independent   Toilet Transfers  Toilet - Technique: Sit pivot  Equipment Used: Standard toilet  Toilet Transfer: Dependent/Total  Toilet Transfers Comments: Min A x2 to/from toilet. Max VCs needed for technique      Bed mobility  Rolling to Left: Supervision  Rolling to Right: Supervision  Supine to Sit: Supervision  Sit to Supine: Supervision  Comment: All bed mobility completed on mat table. OT and PT educated family how to cue pt to attend to LLE due to proprioception deficits.       Transfers  Sit Pivot Transfers: 2 Person assistance (Scoot pivot transfers completed from Plan   Plan  Times per week: 5x a week for 75 mins daily  Times per day: Daily  Current Treatment Recommendations: Strengthening, Wheelchair Mobility Training, Positioning, ROM, Safety Education & Training, Balance Training, Patient/Caregiver Education & Training, Self-Care / ADL, Cognitive/Perceptual Training, Functional Mobility Training, Neuromuscular Re-education, Equipment Evaluation, Education, & procurement, Home Management Training, Endurance Training  G-Code     OutComes Score                                                  AM-PAC Score             Goals  Short term goals  Time Frame for Short term goals: 2 weeks  Short term goal 1: Pt will complete toilet transfer w/ Mod A-ongoing  Short term goal 2: Pt will complete LE dressing supine in bed w/ Min A- ongoing  Short term goal 3: Pt will complete UE dressing w/ Min A-goal met 5/25  Short term goal 4: Pt will complete bathing tasks w/ Min A-goal met 6/4  Short term goal 5: Pt will demonstrate improved functional use of LUE as evident by pt ability to open grooming containers I'ly-ongoing  Long term goals  Time Frame for Long term goals : 4 weeks- all goals ongoing  Long term goal 1: Pt will complete toilet transfer w/ CGA  Long term goal 2: Pt will complete toileting tasks w/ CGA  Long term goal 3: Pt will complete LE dressing (either supine or bed or seated ) w/ CGA  Long term goal 4: Pt will complete UE dressing w/ setup and spvn  Long term goal 5: Pt will complete bathing tasks w/ SBA  Patient Goals   Patient goals : \"be able to walk\"       Therapy Time   Individual Concurrent Group Co-treatment   Time In       1100   Time Out       1200   Minutes       60   Timed Code Treatment Minutes: 60 Minutes        Second Session Therapy Time:   Individual Concurrent Group Co-treatment   Time In       2229   Time Out       1425   Minutes       30     Timed Code Treatment Minutes:  30    Total Treatment Minutes:  60+30=90     Clive Burr, OT

## 2021-06-10 NOTE — PROGRESS NOTES
Assessment complete, VSS, afebrile, medications taken with apple sauce. Pt. denies pain at this time, remains A & O X 4. Colostomy bag clean, and intact with scant stool noted at this time due to recent bag change per off going nurse. No s/sx of distress noted at this time. Call light and over bed table within reach, hourly rounding and frequent visual check in place.

## 2021-06-10 NOTE — PROGRESS NOTES
Pt reminded of her three daily therapy assigned laps. Daughter at bedside offering encouragement and to accompany Pt in the rodriguez. Pt untethered in wheelchair on unit. Chair alarm on and engaged. RN will continue to monitor Pt.

## 2021-06-10 NOTE — PLAN OF CARE
Problem: Falls - Risk of:  Goal: Will remain free from falls  Description: Will remain free from falls  6/10/2021 1955 by Mercy Brown RN  Outcome: Ongoing  Note: Client remains free from falls, bed/chair alarm in place, door open, encouraged to use call light for needs, call light is within reach, bed locked in lowest position,  Will continue to monitor. Problem: Skin Integrity:  Goal: Absence of new skin breakdown  Description: Absence of new skin breakdown  Outcome: Ongoing  Note:   See Sandoval scale. Encourage/assist pt to turn and reposition every two hours and as needed. Heels elevated off bed. Protective barrier placed as needed. Patient kept clean and dry. Pillows used for positioning. Will continue to monitor for skin breakdown. Problem: Pain:  Goal: Control of acute pain  Description: Control of acute pain  Outcome: Ongoing  Note: Pt voices pain needs appropriately, pain is assessed during shift.

## 2021-06-10 NOTE — PLAN OF CARE
Problem: Falls - Risk of:  Goal: Will remain free from falls  Description: Will remain free from falls  6/10/2021 1009 by Fernando Dixon RN  Outcome: Ongoing  Note: Pt educated to use her call light when she needs assistance. Pt also educated not to get up unassisted at this time. Bed alarm on when Pt in bed and chair alarm on when Pt up in chair. Non skid socks on and call light placed within reach.  RN will continue to monitor Pt.    6/10/2021 0416 by Hadley Murrieta RN  Outcome: Ongoing

## 2021-06-10 NOTE — PROGRESS NOTES
ACUTE REHAB UNIT  SPEECH/LANGUAGE PATHOLOGY      [x] Daily  [x] Weekly Care Conference Note  [] Discharge    Patient: Maxx Goldberg      :1945  DYN:[id number]  Rehab Dx/Hx: 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, unspecified ([facility] [address] [I61.2]    Precautions: [] Aspiration  [x] Fall risk  [] Sternal  [] Seizure [] Hip  [] Weight Bearing [] Other  ST Dx: [] Aphasia  [] Dysarthria  [] Apraxia   [] Oropharyngeal dysphagia [x] Cognitive Impairment  [] Other:   Date of Admit: 2021  Room #: 3104/3104-01  Date: 6/10/2021          Current Diet Order:DIET GENERAL;  Dietary Nutrition Supplements: Standard High Calorie Oral Supplement   Vision  Vision: Impaired  Vision Exceptions: Wears glasses at all times  Hearing  Hearing: Within functional limits     Comments: Per admitting H&P (2021): 'Patient is a 68 yo female who originally admitted to hospital /-21 for Right intracerebral hemorrhage with L hemiparesis. She has a know past medical hx of systolic CHF (EF 37%), A Fib (on warfarin), ICD, HTN, Hypothyroidism, rectal cancer s/p resection, and ITP. Her hemorrhage became worse with a midline shift and decline in her neurologic status. Patient was started on seizure prophalxis, and neurosurgery followed, but no indication for surgery. She was also treated for UTI, anti-hypertensives adjusted to maintain bp in normal range  with ICH. She failed MBS and GI consulted who recommended PEG. She was also treated with zosyn for possible aspiration pneumonia. S/P PEG placed on 21. EGD recommended PPI therapy. She remained stable and was discharged to Critical access hospital at [facility]. Patient presented to SNF on 21. She improved with her swallowing and tolerated a regular diet,  and GI removed PEG on 21. Patient has made significant progress in her alertness and activity tolerance, and it is felt that she would benefit from and could tolerate intensive inpatient rehab unit treatment at this time.   Prior to her ICH, she was independent will all ADLs and functional mobility without any AD. She is currently supervision for bed mobility, Max A for stand pivot transfer, Osei for UE dress, MaxA for LB dressing, Osei for slide board transfers, and supervision for WC mobility. She is highly motivated and able to participate in 3 hrs of therapy per day in ARU setting. '  Social/Functional History  Lives With: Spouse;Daughter  Active : Yes  Occupation: Unemployed  [address]: Enjoys going to the [employer]  Additional Comments: Manages own medications, finances    Barriers toward progress: Limited progress noted in memory encoding and recall     Date: 6/10/2021      Tx session 1 Weekly Summary   Total Timed Code Min 30    Total Treatment Minutes 30    Individual Treatment Minutes 30    Group Treatment Minutes 0    Co-Treat Minutes 0    Brief Exception: N/A    Pain None reported    Pain Intervention: [] RN notified  [] Repositioned  [] Intervention offered and patient declined  [x] N/A  [] Other:     Subjective:     Pt in w/c upon entry, L eye closure noted frequently - pt endorsed 2/2 light sensitivity. Lights turned off with improvement noted. Pleasant as always. Objective / Goals:     Patient will participate in ongoing cognitive-linguistic assessment. GOAL MET   GOAL MET   Patient will complete tasks involving executive function skills with 90% accuracy/min cues. Identifying activities from descriptions: 71% accuracy with min cues, improving to 86% accuracy with mod cues. Perseverations exhibited x 2 during tasks w/o awareness. GOAL NOT MET - continue to target   Patient will complete graded recall tasks with 85% accuracy or min cues. Independent recall of tasks completed during previous ST session and skills targeted. Recalled therapist names with 75% accuracy independently. Independent recall of previously learned information re: PT and rationale for therapist's absence.      Recall black and white line drawings from previous session, given initial letters: 40% accuracy independently improving to 60% accuracy with category cues. GOAL PARTIALLY MET - improved recall noted for functional / contextual information; ongoing difficulty with encoding discrete novel information      Other areas targeted:     Education:   Educated to rationale for tx tasks, skills targeted, and improvements noted as well as ongoing deficits    Safety Devices: [x] Call light within reach  [x] Chair alarm activated and connected to nurse call light system  [] Bed alarm activated   [] Other      Assessment:   Cognitive linguistic impairment. Progressing towards goals although persistent difficulty encoding discrete novel information. Improved encoding / recall exhibited with contextualized information. Plan:  Continue per plan of care.      Interventions used this date:  [] Speech/Language Treatment  [] Instruction in HEP  [] Dysphagia Treatment [x] Cognitive Treatment   [] Other:  Discharge recommendations:  [] Home independently  [x] Home with assistance []  24 hour supervision  [] ECF [] Other  Continued Tx Upon Discharge: ? [] Yes    [] No    [x] TBD based on progress while on ARU     [] Vital Stim indicated     [] Other:   Estimated discharge date: 6/12/21      Ana Jenkins MA CCC-SLP; [id number]  Speech-Language Pathologist

## 2021-06-10 NOTE — PROGRESS NOTES
Physical Therapy  Facility/Department: [facility] ACUTE REHAB UNIT  Daily Treatment Note  NAME: Eloy Degroot  : 1945  MRN: [id number]    Date of Service: 6/10/2021    Discharge Recommendations:  24 hour supervision or assist, Home with Home health PT   PT Equipment Recommendations  Other: continue to assess, pt states that she owns a RW, recommend  light weight cong height 20\" w/c with flip back arm rests , 90 degree foot rests,  and a brake extension on the L. Pt is nonambulatory at this time and will have increased independence with mobility in home and community. Assessment   Body structures, Functions, Activity limitations: Decreased functional mobility ; Decreased safe awareness;Decreased balance;Decreased cognition;Decreased vision/visual deficit; Decreased endurance;Decreased strength;Decreased sensation;Decreased fine motor control  Assessment: Pt tolerated tx fair this date. Pt's family (dtr and ) were present for family training on transfers in bathroom (on/off toilet), on/off mat table (height of bed at home), and performing bed mobility. Dtr was able to assist in two transfers and provide safe VC's to patient throughout transfers. Pt continues to requires 2 person assist with transfers d/t balance and safety concerns and requires freqeuent VC's throughout all mobility. Family has no further questions at end of session and felt comfortable assisting pt with transfers and bed mobility. Pt would continue to benefit from skilled PT to improve independence with functional mobility, LE strength, balance, and activity tolerance. Treatment Diagnosis: decreased functional mobility due to nontraumatic ICH  PT Education: Precautions;Transfer Training;Energy Conservation;General Safety; Family Education;Equipment; Adaptive Device Training;Functional Mobility Training  Patient Education: Family training provided to dtr and  throughout session.   REQUIRES PT FOLLOW UP: Yes  Activity Tolerance  Activity Tolerance: Patient limited by fatigue;Patient limited by endurance; Patient limited by pain  Activity Tolerance: Pt requests rests between therapy activities and demos ATKINS     Patient Diagnosis(es): There were no encounter diagnoses. has a past medical history of Arthritis, CAD (coronary artery disease), Cancer ([facility] [address] Cerebral artery occlusion with cerebral infarction [facility]), [facility]congestive [facility] failure) ([facility] Hypertension, and Thyroid disease. has a past surgical history that includes Abdomen surgery; Hysterectomy; Colonoscopy; hernia repair; and pacemaker placement. Restrictions  Position Activity Restriction  Other position/activity restrictions: fall precautions, as of 6/1/2021, pt is now untethered from wall in w/c on unit  Subjective   General  Chart Reviewed: Yes  Additional Pertinent Hx: Pt cont to maximize efforts whenever working with therapy. Pt still req a co treatment 2/2 to the inconsistencies with the transf. However, the past 2 days , pt has demonstrated improved overall ability to transf to commode and bed with focus of pt scooting vs a SPT. Pt's B knee pain, R hemiparesis and decreased proprioception are barriers to pt's progress. Pt is well below baseline and would benefit from cont therapy to maximize potential and increase functional mobility towards Ind to allow for a safer d/c to home. Family / Caregiver Present: Yes (daughter and )  Referring Practitioner: Carly Diaz MD  Subjective  Subjective: Pt seated up in w/c and agreeable to PT. Co treat indicated to maximize functional independence.   Pain Screening  Patient Currently in Pain: Denies  Vital Signs  Patient Currently in Pain: Denies       Orientation  Orientation  Overall Orientation Status: Within Functional Limits      Objective   Bed mobility  Bridging: Supervision (to scoot laterally, x3, increased time and VC's to perform)  Rolling to Left: Supervision  Rolling to Right: Supervision  Supine to Sit: Supervision Sit to Supine: Supervision  Scooting: Supervision  Comment: performed on mat table, VC's for technique and L UE placement throughout bed mobility  Transfers  Sit to Stand: 2 Person Assistance (mod A x2 to grab bars in bathroom (x2), unable to stand longer than ~20\")  Stand to sit: Moderate Assistance (decreased eccentric control into chair)  Squat Pivot Transfers: 2 Person Assistance (scoot pivot transfers x4 total throughout session, detailed below.)  Comment: Pt performed scoot pivot w/c<>toilet with min A x2 and VC's for hand placement and scooting technique throughout. Pt performed scoot pivot w/c<>mat table with min A +CGA (mainly for balance and keeping w/c stable). Education given to daughter and  on technique for all transfers (dtr participated in a few transfers). Pt requires VC's throughout to keep track of LUE. Ambulation  Ambulation?: No  Wheelchair Activities  Wheelchair Size: 20\" cong height w/c  Wheelchair Cushion: Pressure Relieving  Wheelchair Parts Management: Yes  Left Brakes Level of Assistance: Modified independent (brake extender)  Right Brakes Level of Assistance: Independent  Propulsion: Yes  Propulsion 1  Propulsion: Manual  Level: Level Tile  Method: RLE;LLE  Level of Assistance: Modified independent;Supervision (intermittent VC's for looking to L side to avoid obstacles in hallway and keeping LUE on lap)  Description/ Details: Used BLES in a stepping pattern  Distance: 125x2'    Second session: Pt was greeted seated in w/c and agreeable to PT treatment. Pt denies pain at beginning of session. Pt performed w/c mobility to/from room with supervision d/t need for VC's for attending to L side (avoiding obstacles in hallway). Pt negotiates w/c with kaya LE's and required short rest break during session bout of w/c propulsion. Pt performed tub bench transfer and significant education on reason for use of tub bench in home d/t home set-up.  Pt required min A x2 for w/c>tub bench and no assist for moving LE's over tub. Pt required mod A for tub bench>w/c. Pt requires VC's for hand placement, scooting back on tub bench, and LE placement throughout transfer. Pt requires seated rest break after transfer d/t SOB. Pt performed sit<>stand x2 to gary steady to improve transfers and increase LE weight bearing with mod A x2 and VC's for hand placement. Pt required mod A for static standing in gary steady 2x20-30\" but unable to tolerate longer d/t LE fatigue. Pt requires VC's for upright posture (knee extension, hip extension, chest up) and forward gaze throughout static standing. Pt limited in standing transfers/static standing d/t reduced flexibility and decreased LE strength. Pt left seated in chair at end of session with chair alarm on, call light in reach, and all needs met. Goals  Short term goals  Time Frame for Short term goals: 2 weeks  Short term goal 1: Pt will perform all bed mobility with Osei. Ongoing  Short term goal 2: Pt will perform squat pivot with MaxA. Ongoing  Short term goal 3: Pt will perform sit to/from stand with LRAD and MaxA. Ongoing  Short term goal 4: Pt will propel w/c 150' with supervision. Goal achieved  Long term goals  Time Frame for Long term goals : 3-4 weeks  Long term goal 1: Pt will perform all bed mobility with supervision. Ongoing  Long term goal 2: Pt will perform squat pivot with CGA. Ongoing  Long term goal 3: Pt will perform sit to/from stand with LRAD and Osei. Ongoing  Long term goal 4: Pt will propel w/c 150' with Baltazar. Goal achieved  Patient Goals   Patient goals : \"I want to walk again. \"    Plan    Plan  Times per week: 5x/week, 75 minutes a day  Current Treatment Recommendations: Strengthening, Neuromuscular Re-education, Home Exercise Program, ROM, Safety Education & Training, Balance Training, Endurance Training, Patient/Caregiver Education & Training, Functional Mobility Training, Wheelchair Mobility Training, Equipment Evaluation, Education, & procurement, Transfer Training, Gait Training  Safety Devices  Type of devices:  All fall risk precautions in place, Call light within reach, Chair alarm in place, Gait belt, Left in chair     Therapy Time   Individual Concurrent Group Co-treatment   Time In       1100   Time Out       1200   Minutes       60   Timed Code Treatment Minutes: 60 Minutes     Second Session Therapy Time:   Individual Concurrent Group Co-treatment   Time In       2825   Time Out       1425   Minutes       30     Timed Code Treatment Minutes:  60 + 30    Total Treatment Minutes:  [address], PT, DPT

## 2021-06-10 NOTE — DISCHARGE INSTR - COC
Continuity of Care Form    Patient Name: Alberto Rondon   :  1945  MRN:  [id number]    Admit date:  2021  Discharge date:  2021    Code Status Order: Full Code   Advance Directives:   [facility] Documentation     Date/Time Healthcare Directive Type of Healthcare Directive Copy in [address] Agent's Name Healthcare Agent's Phone Number    21 0699  No, patient does not have an advance directive for healthcare treatment -- -- -- -- --          Admitting Physician:  Darío rPo MD  PCP: No primary care provider on file.     Discharging Nurse: Radha Lassiter Unit/Room#: 1005/5637-14  Discharging Unit Phone Number: [phone]    Emergency Contact:   Extended Emergency Contact Information  Primary Emergency Contact: Judge Leong  Mobile Phone: [phone]  Relation: Child  Secondary Emergency Contact: Abelardo Weathers  Mobile Phone: [phone]  Relation: Child    Past Surgical History:  Past Surgical History:   Procedure Laterality Date    ABDOMEN SURGERY      COLONOSCOPY      HERNIA REPAIR      HYSTERECTOMY      PACEMAKER PLACEMENT         Immunization History:   Immunization History   Administered Date(s) Administered    COVID-19, LUIS Duran, 100mcg/0.5mL 2021, 2021       Active Problems:  Patient Active Problem List   Diagnosis Code    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, unspecified ([facility] [address] I61.2       Isolation/Infection:   Isolation          No Isolation        Patient Infection Status     None to display          Nurse Assessment:  Last Vital Signs: /68   Pulse 64   Temp 98 °F (36.7 °C) (Oral)   Resp 16   Ht 5' 2\" (1.575 m)   Wt 174 lb 2.6 oz (79 kg)   SpO2 94%   BMI 31.85 kg/m²     Last documented pain score (0-10 scale): Pain Level: 0  Last Weight:   Wt Readings from Last 1 Encounters:   21 174 lb 2.6 oz (79 kg)     Mental Status:  oriented and alert    IV Initial home SN evaluation visit to occur within 24-48 hours of hospital discharge  [facility] nursing visits daily, then QOD with progression as warranted for:   medication management   VS and clinical assessment   S&S chronic disease exacerbation education + when to contact MD / NP   care coordination   Medication Reconciliation during 1st SN visit   Nurse telephone visit on the days that visit is not being made in person for first 30 days                                      PT/OT/Speech    Evaluations in home within 24-48 hours of hospital discharge to include DME and home safety   [facility] therapy 5 days, then 3x a week    OT evaluation for [facility] needs for personal care       Palliative Care referral within 5 days of hospital discharge       evaluation within 24-48 hours of hospital discharge to evaluate resources & insurance for potential  [place], [place], LTC, and Medicaid options       Dietician evaluation within 5 days of hospital discharge       PCP visit within 3 days of hospital discharge, followed by PCP visit @ 10 days, and 21 days    Recommend for home care vitals    Weight Bearing Status/Restrictions: No weight bearing restirctions  Other Medical Equipment (for information only, NOT a DME order):  wheelchair and bath bench  Other Treatments: n/a      Patient's personal belongings (please select all that are sent with patient):  Glasses    RN SIGNATURE:  Electronically signed by Katerina Walsh on 6/12/21 at 11:31 AM EDT    CASE MANAGEMENT/SOCIAL WORK SECTION    Inpatient Status Date: ***    Readmission Risk Assessment Score:  Readmission Risk              Risk of Unplanned Readmission:  13           Discharging to Facility/ Agency   · Name: Gustavo Silva  Phone: [phone]         Fax: [phone]            / signature: Electronically signed by ISSA Daniels on 6/10/21 at 4:31 PM EDT    PHYSICIAN SECTION    Prognosis: {Prognosis:0001930467}    Condition at Discharge: Tirso Painter Patient Condition:[id number]}    Rehab Potential (if transferring to Rehab): {Prognosis:5193209781}    Recommended Labs or Other Treatments After Discharge: ***    Physician Certification: I certify the above information and transfer of Ramu Urena  is necessary for the continuing treatment of the diagnosis listed and that she requires {Admit to Appropriate Level of Care:73716} for {GREATER/LESS:915834893} 30 days.      Update Admission H&P: {CHP DME Changes in EXKST:553681854}    PHYSICIAN SIGNATURE:  {Esignature:[id number]}

## 2021-06-10 NOTE — PROGRESS NOTES
Department of Physical Medicine & Rehabilitation  Progress Note    Patient Identification:  Ochoa Babb  [id number]  : 1945  Admit date: 2021    Chief Complaint: L hemiparesis due to R intracerebral hemorrhage    Subjective:   Feeling well today with planned discharge Saturday. Still having trouble with standing and walking but she is going to be discharge home with a lot of family support and home care. Plan to get the most out of therapy the next few days. Objective:  Patient Vitals for the past 24 hrs:   BP Temp Temp src Pulse Resp SpO2   06/10/21 0904 112/68 98 °F (36.7 °C) Oral 64 16 94 %   21 2048 104/65 98 °F (36.7 °C) Oral 69 16 94 %   21 1725 122/63   65     21 1005 (!) 122/57 97.3 °F (36.3 °C) Oral 62 18 92 %     Const: Alert. No distress, pleasant. HEENT: Normocephalic, atraumatic. Normal sclera/conjunctiva. MMM. CV: Regular rate and rhythm. Resp: No respiratory distress. Lungs CTAB. Abd: Soft, nontender, nondistended, NABS+   Ext: No edema. Neuro: Alert, oriented, appropriately interactive. Left neglect noted. Motor examination reveals normal strength in right side, 4-/5 strength left side diffusely. Psych: Cooperative, appropriate mood and affect    Laboratory data: Available via EMR.    Last 24 hour lab  Recent Results (from the past 24 hour(s))   Basic metabolic panel    Collection Time: 06/10/21  5:48 AM   Result Value Ref Range    Sodium 136 136 - 145 mmol/L    Potassium 4.2 3.5 - 5.1 mmol/L    Chloride 101 99 - 110 mmol/L    CO2 27 21 - 32 mmol/L    Anion Gap 8 3 - 16    Glucose 82 70 - 99 mg/dL    BUN 11 7 - 20 mg/dL    CREATININE <0.5 (L) 0.6 - 1.2 mg/dL    GFR Non-African American >60 >60    GFR African American >60 >60    Calcium 9.3 8.3 - 10.6 mg/dL   CBC auto differential    Collection Time: 06/10/21  5:48 AM   Result Value Ref Range    WBC 5.4 4.0 - 11.0 K/uL    RBC 3.77 (L) 4.00 - 5.20 M/uL    Hemoglobin 11.6 (L) 12.0 - 16.0 g/dL Hematocrit 34.3 (L) 36.0 - 48.0 %    MCV 90.9 80.0 - 100.0 fL    MCH 30.9 26.0 - 34.0 pg    MCHC 34.0 31.0 - 36.0 g/dL    RDW 14.7 12.4 - 15.4 %    Platelets 858 915 - 753 K/uL    MPV 7.7 5.0 - 10.5 fL    Neutrophils % 71.2 %    Lymphocytes % 14.6 %    Monocytes % 10.0 %    Eosinophils % 3.1 %    Basophils % 1.1 %    Neutrophils Absolute 3.8 1.7 - 7.7 K/uL    Lymphocytes Absolute 0.8 (L) 1.0 - 5.1 K/uL    Monocytes Absolute 0.5 0.0 - 1.3 K/uL    Eosinophils Absolute 0.2 0.0 - 0.6 K/uL    Basophils Absolute 0.1 0.0 - 0.2 K/uL       Therapy progress:  PT  Position Activity Restriction  Other position/activity restrictions: fall precautions, as of 6/1/2021, pt is now untethered from wall in w/c on unit  Objective     Sit to Stand: Dependent/Total, 2 Person Assistance (STS x3  to an DIMITRI RW with pt req CGA x1 and Min A x1 for a dependent transf)  Stand to sit: Minimal Assistance (Min A and  VC for hand placement and to control descent to chair)  Bed to Chair: Dependent/Total (HIgher > lower with Min A x2/)  Device:  (Lite Gait)  Other Apparatus: Wheelchair follow  Assistance: Dependent/Total  Distance: <10'  OT  PT Equipment Recommendations  Other: continue to assess, pt states that she owns a RW, recommend  light weight cong height 20\" w/c with flip back arm rests , 90 degree foot rests,  and a brake extension on the L. Pt is nonambulatory at this time and will have increased independence with mobility in home and community. Toilet - Technique: Sit pivot  Equipment Used: Standard toilet  Toilet Transfers Comments: w/c <> toilet completed w/ Min Ax2. VCs needed for technique  Assessment        SLP          Body mass index is 31.85 kg/m². Assessment and Plan:  Right intracerebral hemorrhage with L hemiparesis  - Cont lipitor 40 mg daily, ASA 81 mg daily     UTI, resolved   UA: cloudy, large amoutn of LE, WBC 10-20, bacteria rare.   - Urine culture growing Klebsiella pneumoniae   - Completed course of amoxicillin

## 2021-06-10 NOTE — CARE COORDINATION
Referred to patient for home care. Referrals made to [facility], they are unable to accept. Referral made to [facility] home care.   Electronically signed by ISSA Perdomo LISW-S on 6/10/2021 at 4:32 PM

## 2021-06-11 LAB
BILIRUBIN URINE: NEGATIVE
BLOOD, URINE: NEGATIVE
CLARITY: CLEAR
COLOR: YELLOW
GLUCOSE URINE: NEGATIVE MG/DL
KETONES, URINE: NEGATIVE MG/DL
LEUKOCYTE ESTERASE, URINE: ABNORMAL
MAGNESIUM: 1.8 MG/DL (ref 1.8–2.4)
MICROSCOPIC EXAMINATION: YES
NITRITE, URINE: NEGATIVE
PH UA: 6.5 (ref 5–8)
PROTEIN UA: NEGATIVE MG/DL
RBC UA: NORMAL /HPF (ref 0–4)
SPECIFIC GRAVITY UA: <=1.005 (ref 1–1.03)
URINE REFLEX TO CULTURE: ABNORMAL
URINE TYPE: ABNORMAL
UROBILINOGEN, URINE: 0.2 E.U./DL
WBC UA: NORMAL /HPF (ref 0–5)

## 2021-06-11 PROCEDURE — 97530 THERAPEUTIC ACTIVITIES: CPT

## 2021-06-11 PROCEDURE — 1280000000 HC REHAB R&B

## 2021-06-11 PROCEDURE — 81001 URINALYSIS AUTO W/SCOPE: CPT

## 2021-06-11 PROCEDURE — 6360000002 HC RX W HCPCS: Performed by: PHYSICAL MEDICINE & REHABILITATION

## 2021-06-11 PROCEDURE — 6370000000 HC RX 637 (ALT 250 FOR IP): Performed by: PHYSICAL MEDICINE & REHABILITATION

## 2021-06-11 PROCEDURE — 99231 SBSQ HOSP IP/OBS SF/LOW 25: CPT | Performed by: PHYSICAL MEDICINE & REHABILITATION

## 2021-06-11 PROCEDURE — 97130 THER IVNTJ EA ADDL 15 MIN: CPT

## 2021-06-11 PROCEDURE — 83735 ASSAY OF MAGNESIUM: CPT

## 2021-06-11 PROCEDURE — 97129 THER IVNTJ 1ST 15 MIN: CPT

## 2021-06-11 PROCEDURE — 36415 COLL VENOUS BLD VENIPUNCTURE: CPT

## 2021-06-11 PROCEDURE — 97535 SELF CARE MNGMENT TRAINING: CPT

## 2021-06-11 RX ORDER — POTASSIUM CHLORIDE 20 MEQ/1
20 TABLET, EXTENDED RELEASE ORAL
Qty: 60 TABLET | Refills: 3 | Status: SHIPPED | OUTPATIENT
Start: 2021-06-11

## 2021-06-11 RX ORDER — LANOLIN ALCOHOL/MO/W.PET/CERES
400 CREAM (GRAM) TOPICAL 4 TIMES DAILY
Qty: 30 TABLET | Refills: 1 | Status: SHIPPED | OUTPATIENT
Start: 2021-06-11

## 2021-06-11 RX ORDER — POLYETHYLENE GLYCOL 3350 17 G/17G
17 POWDER, FOR SOLUTION ORAL DAILY
Qty: 527 G | Refills: 1 | Status: SHIPPED | OUTPATIENT
Start: 2021-06-11 | End: 2021-07-11

## 2021-06-11 RX ORDER — CHOLECALCIFEROL (VITAMIN D3) 50 MCG
2000 TABLET ORAL DAILY
Qty: 60 TABLET | Refills: 1 | Status: SHIPPED | OUTPATIENT
Start: 2021-06-11

## 2021-06-11 RX ORDER — PANTOPRAZOLE SODIUM 40 MG/1
40 TABLET, DELAYED RELEASE ORAL
Qty: 30 TABLET | Refills: 3 | Status: SHIPPED | OUTPATIENT
Start: 2021-06-12

## 2021-06-11 RX ORDER — AMLODIPINE BESYLATE 5 MG/1
5 TABLET ORAL DAILY
Qty: 30 TABLET | Refills: 3 | Status: SHIPPED | OUTPATIENT
Start: 2021-06-11

## 2021-06-11 RX ORDER — LEVOTHYROXINE SODIUM 0.05 MG/1
50 TABLET ORAL DAILY
Qty: 30 TABLET | Refills: 3 | Status: SHIPPED | OUTPATIENT
Start: 2021-06-12

## 2021-06-11 RX ORDER — MECOBALAMIN 5000 MCG
5 TABLET,DISINTEGRATING ORAL NIGHTLY
Qty: 90 TABLET | Refills: 0 | Status: SHIPPED | OUTPATIENT
Start: 2021-06-11

## 2021-06-11 RX ORDER — FUROSEMIDE 20 MG/1
20 TABLET ORAL DAILY
Qty: 60 TABLET | Refills: 3 | Status: SHIPPED | OUTPATIENT
Start: 2021-06-11

## 2021-06-11 RX ADMIN — MAGNESIUM OXIDE TAB 400 MG (240 MG ELEMENTAL MG) 400 MG: 400 (240 MG) TAB at 21:25

## 2021-06-11 RX ADMIN — CARVEDILOL 25 MG: 12.5 TABLET, FILM COATED ORAL at 16:51

## 2021-06-11 RX ADMIN — MICONAZOLE NITRATE: 20 POWDER TOPICAL at 10:11

## 2021-06-11 RX ADMIN — AMLODIPINE BESYLATE 5 MG: 5 TABLET ORAL at 10:10

## 2021-06-11 RX ADMIN — MICONAZOLE NITRATE: 20 POWDER TOPICAL at 21:25

## 2021-06-11 RX ADMIN — CITALOPRAM HYDROBROMIDE 20 MG: 20 TABLET ORAL at 10:10

## 2021-06-11 RX ADMIN — FUROSEMIDE 20 MG: 20 TABLET ORAL at 10:09

## 2021-06-11 RX ADMIN — CARVEDILOL 25 MG: 12.5 TABLET, FILM COATED ORAL at 10:10

## 2021-06-11 RX ADMIN — PREDNISONE 7.5 MG: 5 TABLET ORAL at 10:09

## 2021-06-11 RX ADMIN — DICLOFENAC SODIUM 2 G: 10 GEL TOPICAL at 21:25

## 2021-06-11 RX ADMIN — POLYETHYLENE GLYCOL 3350 17 G: 17 POWDER, FOR SOLUTION ORAL at 10:11

## 2021-06-11 RX ADMIN — POTASSIUM CHLORIDE 20 MEQ: 20 TABLET, EXTENDED RELEASE ORAL at 10:09

## 2021-06-11 RX ADMIN — MAGNESIUM OXIDE TAB 400 MG (240 MG ELEMENTAL MG) 400 MG: 400 (240 MG) TAB at 13:55

## 2021-06-11 RX ADMIN — ACETAMINOPHEN 650 MG: 325 TABLET ORAL at 16:51

## 2021-06-11 RX ADMIN — MAGNESIUM OXIDE TAB 400 MG (240 MG ELEMENTAL MG) 400 MG: 400 (240 MG) TAB at 10:09

## 2021-06-11 RX ADMIN — ATORVASTATIN CALCIUM 40 MG: 40 TABLET, FILM COATED ORAL at 21:25

## 2021-06-11 RX ADMIN — MAGNESIUM OXIDE TAB 400 MG (240 MG ELEMENTAL MG) 400 MG: 400 (240 MG) TAB at 16:51

## 2021-06-11 RX ADMIN — LISINOPRIL 40 MG: 40 TABLET ORAL at 10:09

## 2021-06-11 RX ADMIN — ASPIRIN 81 MG: 81 TABLET, CHEWABLE ORAL at 10:09

## 2021-06-11 RX ADMIN — DICLOFENAC SODIUM 2 G: 10 GEL TOPICAL at 10:11

## 2021-06-11 RX ADMIN — LEVOTHYROXINE SODIUM 50 MCG: 0.05 TABLET ORAL at 06:32

## 2021-06-11 RX ADMIN — ENOXAPARIN SODIUM 40 MG: 40 INJECTION SUBCUTANEOUS at 10:10

## 2021-06-11 RX ADMIN — PANTOPRAZOLE SODIUM 40 MG: 40 TABLET, DELAYED RELEASE ORAL at 06:32

## 2021-06-11 RX ADMIN — Medication 2000 UNITS: at 10:10

## 2021-06-11 RX ADMIN — Medication 5 MG: at 21:25

## 2021-06-11 ASSESSMENT — PAIN DESCRIPTION - ORIENTATION
ORIENTATION: OTHER (COMMENT)
ORIENTATION: OTHER (COMMENT)

## 2021-06-11 ASSESSMENT — PAIN SCALES - GENERAL
PAINLEVEL_OUTOF10: 0
PAINLEVEL_OUTOF10: 3
PAINLEVEL_OUTOF10: 3
PAINLEVEL_OUTOF10: 0
PAINLEVEL_OUTOF10: 0

## 2021-06-11 ASSESSMENT — PAIN DESCRIPTION - FREQUENCY
FREQUENCY: INTERMITTENT
FREQUENCY: INTERMITTENT

## 2021-06-11 ASSESSMENT — PAIN - FUNCTIONAL ASSESSMENT
PAIN_FUNCTIONAL_ASSESSMENT: ACTIVITIES ARE NOT PREVENTED
PAIN_FUNCTIONAL_ASSESSMENT: ACTIVITIES ARE NOT PREVENTED

## 2021-06-11 ASSESSMENT — PAIN DESCRIPTION - ONSET
ONSET: ON-GOING
ONSET: ON-GOING

## 2021-06-11 ASSESSMENT — PAIN DESCRIPTION - DESCRIPTORS
DESCRIPTORS: ACHING
DESCRIPTORS: ACHING

## 2021-06-11 ASSESSMENT — PAIN DESCRIPTION - PAIN TYPE
TYPE: ACUTE PAIN
TYPE: ACUTE PAIN

## 2021-06-11 ASSESSMENT — PAIN DESCRIPTION - LOCATION
LOCATION: GENERALIZED
LOCATION: GENERALIZED

## 2021-06-11 NOTE — PROGRESS NOTES
Department of Physical Medicine & Rehabilitation  Progress Note    Patient Identification:  Melissa Correia  [id number]  : 1945  Admit date: 2021    Chief Complaint: L hemiparesis due to R intracerebral hemorrhage    Subjective:   Doing well today. She is planning on going home tomorrow with her family and 24 hour care. No new issues overnight. Objective:  Patient Vitals for the past 24 hrs:   BP Temp Temp src Pulse Resp SpO2   06/10/21 2032 111/60 98.2 °F (36.8 °C) Oral 61 16 95 %   06/10/21 1701 108/69   72       Const: Alert. No distress, pleasant. HEENT: Normocephalic, atraumatic. Normal sclera/conjunctiva. MMM. CV: Regular rate and rhythm. Resp: No respiratory distress. Lungs CTAB. Abd: Soft, nontender, nondistended, NABS+   Ext: No edema. Neuro: Alert, oriented, appropriately interactive. Left neglect noted. Motor examination reveals normal strength in right side, 4-/5 strength left side diffusely. Psych: Cooperative, appropriate mood and affect    Laboratory data: Available via EMR. Last 24 hour lab  Recent Results (from the past 24 hour(s))   Magnesium    Collection Time: 21  6:40 AM   Result Value Ref Range    Magnesium 1.80 1.80 - 2.40 mg/dL       Therapy progress:  PT  Position Activity Restriction  Other position/activity restrictions: fall precautions, as of 2021, pt is now untethered from wall in w/c on unit  Objective     Sit to Stand: 2 Person Assistance (mod A x2, perfromed x2 with kaya UE assist on grab bars. VC's for hand placement, scooting out prior to transfer, and foot placement)  Stand to sit: Moderate Assistance (assist with eccentric control into chair and aligning hips with w/c)  Bed to Chair: Moderate assistance (bed<>chair x1, bed>chair x1, pt with variable assist levels throughout transfers, pt requiring only CGA from bed>chair initially but required mod A the second (possible d/t fatigue).  Pt performed w/c>bed with mod A x1 + min A x1 (d/t incline into bed))  Device:  (Lite Gait)  Other Apparatus: Wheelchair follow  Assistance: Dependent/Total  Distance: <10'  OT  PT Equipment Recommendations  Equipment Needed: Yes  Mobility Devices: Wheelchair  Wheelchair: Light Weight  Other: Reccomending lightweight cong height 20\" w/c with flip back armrests, 90 degree foot rests, and brake extension on L side. Pt is nonambulatory at this time and will have increased independence with mobility in home and community with w/c. Toilet - Technique: Sit pivot  Equipment Used: Standard toilet  Toilet Transfers Comments: Min A x2 to/from toilet. Max VCs needed for technique  Assessment        SLP          Body mass index is 31.85 kg/m². Assessment and Plan:  Right intracerebral hemorrhage with L hemiparesis  - Cont lipitor 40 mg daily, ASA 81 mg daily     UTI, resolved   UA: cloudy, large amoutn of LE, WBC 10-20, bacteria rare. - Urine culture growing Klebsiella pneumoniae   - Completed course of amoxicillin     Systolic CHF (EF 20%)  - Cont coreg 25 mg BID,Lasix 20 mg daily     A Fib with ICD pacemaker  - Cont ASA (had been on Coumadin)     HTN  - Cont norvasc 5 mg daily, lisinopril 40 mg daily     HLD  - Cont lipitor 40 mg daily     Hypothyroidism  - Cont synthroid 50 mcg daily     Hx of rectal cancer s/p resection  - Colostomy present, monitor output    PPx: protonix 40 mg daily, lovenox 40 mg daily     ITP- Monitor     Bowels: Schedule Miralax + Senna S. Follow bowel movements. Enema or suppository if needed.      Bladder: Check PVR x 3.   [address] if PVR > 200ml or if any volume is > 500 ml.      Pain: Tylenol is ordered prn.        Rehab Progress: Improving  Anticipated Dispo: SNF  Services/DME: TBD  ELOS: 6/17      Galilea Feldman D.O. M.P.H  PM&R  6/11/2021  9:32 AM

## 2021-06-11 NOTE — PROGRESS NOTES
Physical Therapy  Facility/Department: [place] [address] [facility]UTE [facility]  Daily Treatment Note/ Discharge  NAME: Selena Lewis  : 1945  MRN: [id number]    Date of Service: 2021    Discharge Recommendations:  24 hour supervision or assist, Home with Home health PT   PT Equipment Recommendations  Equipment Needed: Yes  Mobility Devices: Wheelchair  Wheelchair: Light Weight  Other: Reccomending lightweight cong height 20\" w/c with flip back armrests, 90 degree foot rests, and brake extension on L side. Pt is nonambulatory at this time and will have increased independence with mobility in home and community with w/c. Assessment   Body structures, Functions, Activity limitations: Decreased functional mobility ; Decreased safe awareness;Decreased balance;Decreased cognition;Decreased vision/visual deficit; Decreased endurance;Decreased strength;Decreased sensation;Decreased fine motor control  Assessment: Pt tolerated tx well this date. Pt performing transfers with varying assist levels between CGA and min A x2 depending on transfer surface and fatigue level. Pt performing bed mobility with supervision but occasionally requires increased time to perform. Pt performing sit<>stand transfers with grab bars and mod A x2 but unable to acheive upright posture and can only tolerate ~15-20\" in standing. Pt able to meet 3/4 short term goals and 2/4 long term goals during this admission. Pt plans to return home with assist from family , use of w/c for mobility, and performing scoot pivots for transferring. Pt would benefit from continued PT to improve independence with functional mobilty, LE strength/ROM, balance, and activity tolerance. Treatment Diagnosis: decreased functional mobility due to nontraumatic ICH  PT Education: Transfer Training;Energy Conservation;General Safety; Functional Mobility Training  Patient Education: pt verbalizes understanding  REQUIRES PT FOLLOW UP: No  Activity Tolerance  Activity Tolerance: Patient limited by fatigue;Patient limited by endurance; Patient limited by pain  Activity Tolerance: Pt requests rests between therapy activities and demos ATKINS     Patient Diagnosis(es): There were no encounter diagnoses. has a past medical history of Arthritis, CAD (coronary artery disease), Cancer ([facility]), Cerebral artery occlusion with cerebral infarction [facility]), CHF (congestive heart failure) ([facility]), Hypertension, and Thyroid disease. has a past surgical history that includes Abdomen surgery; Hysterectomy; Colonoscopy; hernia repair; and pacemaker placement. Restrictions  Position Activity Restriction  Other position/activity restrictions: fall precautions, as of 6/1/2021, pt is now untethered from wall in w/c on unit  Subjective   General  Chart Reviewed: Yes  Additional Pertinent Hx: Pt cont to maximize efforts whenever working with therapy. Pt still req a co treatment 2/2 to the inconsistencies with the transf. However, the past 2 days , pt has demonstrated improved overall ability to transf to commode and bed with focus of pt scooting vs a SPT. Pt's B knee pain, R hemiparesis and decreased proprioception are barriers to pt's progress. Pt is well below baseline and would benefit from cont therapy to maximize potential and increase functional mobility towards Ind to allow for a safer d/c to home. Family / Caregiver Present: No  Referring Practitioner: Shakir Barrett MD  Subjective  Subjective: Pt supine in bed and agreeable to PT treatment. Pt requires Co treatment with OT to ensure safety throughout mobility and maximize functional mobility.    Pain Screening  Patient Currently in Pain: Denies  Vital Signs  Patient Currently in Pain: Denies       Orientation  Orientation  Overall Orientation Status: Within Functional Limits     Objective   Bed mobility  Rolling to Left: Modified independent  Supine to Sit: Supervision;Minimal assistance (performed x2, first time required VC's for technique and min A at trunk, second time performed with supervision and use of momentum from LE's to get trunk upright)  Sit to Supine: Modified independent  Scooting: Supervision  Comment: Bed mobility completed in bed with HOB flat and no use of bed rails  Transfers  Sit to Stand: 2 Person Assistance (mod A x2, perfromed x2 with kaya UE assist on grab bars. VC's for hand placement, scooting out prior to transfer, and foot placement)  Stand to sit: Moderate Assistance (assist with eccentric control into chair and aligning hips with w/c)  Bed to Chair: Moderate assistance (bed<>chair x1, bed>chair x1, pt with variable assist levels throughout transfers, pt requiring only CGA from bed>chair initially but required mod A the second (possible d/t fatigue). Pt performed w/c>bed with mod A x1 + min A x1 (d/t incline into bed))  Squat Pivot Transfers: 2 Person Assistance (pt performed scoot pivot transfers w/c>toilet with mod A x1, toilet>w/c with min A x2 and w/c<>tub bench with min A x2, second person needed for maintaining balance and stabilizing chair)  Comment: Pt performed multiple scoot transfers this date with varying assist levels d/t transfer surface and fatigue. Pt requires mod/max VC's for LUE placement throughout transfers, foot placement, and R hand placement. Pt frequently requires increased time througohut transfers d/t fatigue and ATKINS. Ambulation  Ambulation?: Yes  WB Status: Pt is not safe for ambulation at this time     Balance  Standing - Static: Poor;-  Standing - Dynamic: Poor;-  Comments: Pt performed standing at grab bars 2x20\" with kaya UE assist on grab bars and mod A x2 while donning briefs and pants. Pt performed dynamic seated balance on tub bench for ~15' with SBA/supervision while performing shower with OT. Pt had a 2 LOB forward during doffing socks which required min-mod A to keep pt balance on tub bench. More detailed information regarding shower assist levels in OT note.     Goals  Short term goals  Time Frame for Short term goals: 2 weeks  Short term goal 1: Pt will perform all bed mobility with Osei. Goal met  Short term goal 2: Pt will perform squat pivot with MaxA. Goal met  Short term goal 3: Pt will perform sit to/from stand with LRAD and MaxA. Goal not met  Short term goal 4: Pt will propel w/c 150' with supervision. Goal achieved  Long term goals  Time Frame for Long term goals : 3-4 weeks  Long term goal 1: Pt will perform all bed mobility with supervision. Goal met  Long term goal 2: Pt will perform squat pivot with CGA. Not met  Long term goal 3: Pt will perform sit to/from stand with LRAD and Osei. Not met  Long term goal 4: Pt will propel w/c 150' with [place]. Goal achieved  Patient Goals   Patient goals : \"I want to walk again. \"    Plan    Plan  Times per week: 5x/week, 75 minutes a day  Current Treatment Recommendations: Strengthening, Neuromuscular Re-education, Home Exercise Program, ROM, Safety Education & Training, Balance Training, Endurance Training, Patient/Caregiver Education & Training, Functional Mobility Training, Wheelchair Mobility Training, Equipment Evaluation, Education, & procurement, Transfer Training, Gait Training  Safety Devices  Type of devices:  All fall risk precautions in place, Call light within reach, Chair alarm in place, Left in chair     Therapy Time   Individual Concurrent Group Co-treatment   Time In       0730   Time Out       0900   Minutes       90   Timed Code Treatment Minutes: 90 Minutes  Variance: 0    Meghan Alexis, PT, DPT

## 2021-06-11 NOTE — CARE COORDINATION
Patient was delieverd a Drive wheelchair from [employer]. This wheelchair is not the correct height for patient. Patient currently using an [employer] wheelchair that is 15 inches in height. Patient is not able to use the Drive wheelchair. Calls placed to [facility], Husam Crawford, Maximilian May, [address] [facility] [address] does have [employer] wheelchairs but they are on a two week back log. Spoke to [place] in case management who approved patient to take hospital wheelchair home until another wheelchair can be ordered. Daughter, Kylie Goldstein, agreeable to return wheelchair. Octavia Johns in therapy also approved. Spoke to daughter at length. She has the order for the wheelchair and plans to call [facility] Crawford. [facility] [employer], rep. Elsa muhammad RN updated for patient to take hospital wheelchair home on d/c.  Electronically signed by ISSA Quezada, GALLITO on 6/11/2021 at 4:29 PM

## 2021-06-11 NOTE — PROGRESS NOTES
Occupational Therapy  Facility/Department: [facility] ACUTE REHAB UNIT  Daily Treatment Note/Discharge Summary   NAME: Everton Hodge  : 1945  MRN: [id number]    Date of Service: 2021    Discharge Recommendations:  Home with Home health OT, Home with nursing aide, 24 hour supervision or assist, Continue to assess pending progress  OT Equipment Recommendations  ADL Assistive Devices: Transfer Tub Bench;Long-handled Sponge;Grab Bars - shower;Grab Bars - toilet  Other: Pt will benefit from a cong height 20'' wide w/c. Assessment   Performance deficits / Impairments: Decreased functional mobility ; Decreased cognition;Decreased ADL status; Decreased endurance;Decreased fine motor control;Decreased ROM; Decreased sensation;Decreased coordination;Decreased strength;Decreased balance;Decreased posture;Decreased safe awareness  Assessment: Pt has demonstrated good progress in OT since admission and pt has met 4/10 OT goals. Pt is now completing scoot pivot transfers w/ increase independence and she is able to complete UE dressing w/ setup. Pt requires fluctuating levels of assistance for transfers depending on the surface she is transferring to and depending on her fatigue. Pt continues to require up to 2 person assist at times however pt's family has been present for family training to assist pt at d/c. Pt plans to return home w/ 24hr assistance from family and [place] OT. OT educated family regarding equipment needs and family was going to purchase equipment. Pt had no questions about d/c and reported she is ready to go home tomorrow. Treatment Diagnosis: Decreased ADL status and decreased ROM 2/2 CVA      OT Education: ADL Adaptive Strategies;Transfer Training;Precautions; Energy Conservation;Equipment  Patient Education: d/c planning- pt verb understanding  REQUIRES OT FOLLOW UP: Yes  Activity Tolerance  Activity Tolerance: Patient limited by fatigue  Activity Tolerance: Pt was very fatigued this morning and she appeared to have increased difficulty processing directions as a result  Safety Devices  Safety Devices in place: Yes  Type of devices: Call light within reach; Chair alarm in place; Left in chair;Nurse notified         Patient Diagnosis(es): There were no encounter diagnoses. has a past medical history of Arthritis, CAD (coronary artery disease), Cancer ([facility] [address] Cerebral artery occlusion with cerebral infarction [facility]), CHF (congestive heart failure) ([facility] [address] Hypertension, and Thyroid disease. has a past surgical history that includes Abdomen surgery; Hysterectomy; Colonoscopy; hernia repair; and pacemaker placement. Restrictions  Position Activity Restriction  Other position/activity restrictions: fall precautions, as of 6/1/2021, pt is now untethered from wall in w/c on unit  Subjective   General  Chart Reviewed: Yes  Patient assessed for rehabilitation services?: Yes  Additional Pertinent Hx: Per MD CHAVARRIA and P:Patient is a 66 yo female who originally admitted to [facility] /23/21-2/9/21 for Right intracerebral hemorrhage with L hemiparesis. She has a know past medical hx of systolic CHF (EF 40%), A Fib (on warfarin), ICD, HTN, Hypothyroidism, rectal cancer s/p resection, and ITP. Her hemorrhage became worse with a midline shift and decline in her neurologic status. Patient was started on seizure prophalxis, and neurosurgery followed, but no indication for surgery. She was also treated for UTI, anti-hypertensives adjusted to maintain bp in normal range  with ICH. She failed MBS and GI consulted who recommended PEG. She was also treated with zosyn for possible aspiration pneumonia. S/P PEG placed on 2/6/21. EGD recommended PPI therapy. She remained stable and was discharged to [facility]. Patient presented to SNF on 2/9/21. She improved with her swallowing and tolerated a regular diet,  and GI removed PEG on 5/18/21. Pt had been at the [facility] since February and has now exhausted her days left at the SNF.  Pt admitted to ARU 5/21  Family / Caregiver Present: No  Referring Practitioner: Dr. Ronda De La O  Diagnosis: CVA  Subjective  Subjective: Pt was asleep in bed upon arrival. Upon awakening pt reported she was very tired and was somewhat frustrated that she had to get up for therapy. Pt was agreeable to OT/PT. Co treat indicated to maximize functional independence. General Comment  Comments: Bartlett Colder Vital Signs  Patient Currently in Pain: Denies   Orientation     Objective    ADL  Feeding: Setup (Assistance needed to open containers)  Grooming: Setup (Pt washed her face seated on TTB. Pt combed hair w/ setup provided)  UE Bathing: Setup;Supervision;Verbal cueing (Pt washed 4/4 components of UE bathing seated on TTB. Min VCs needed to problem solve how to use LUE to wash RUE)  LE Bathing: Setup;Verbal cueing; Increased time to complete;Contact guard assistance (Pt washed BLEs w/ use of LH sponge. Pt washed marilyn area seated on TTB. Pt did not wash buttocks)  UE Dressing: Setup;Verbal cueing (Pt donned tshirt w/ min VCs for cong technique ')  LE Dressing: Setup;Verbal cueing; Increased time to complete;Dependent/Total (Pt threaded BLEs into underwear and pants w/ min A to correct LLE in pants. Pt required max VCs to sequence successfully. Assist x2 needed in stance for clothing management. Total A needed to don socks. Min A to adjust heels of shoes)  Toileting: Moderate assistance (Pt required assistance to remove depends while pt weight shifted L and R in w/c prior to transfer to toilet. Pt completed marilyn care seated on toilet I'ly)  Additional Comments: ADLs completed w/ above listed levels of assistance needed. Pt was originally very tired and somewhat frustrated due to early morning therapy. Pt appeared overwhelmed while doing initial transfers to the w/c and to the toilet which appeared related to the fact that pt just woke up.  Pt was somewhat emotional and she was complaining that she was uncomfortable sitting on the toilet however pt was having difficulty explaining and then processing how to make herself more comfortable on the toilet. Pt required increased VCs for all transfers and ADLs today which may have been related to the fact that the pt is not a morning person and she had just woken up. Balance  Sitting Balance: Contact guard assistance (CGA on TTB when doffing socks. Spvn for bathing tasks on TTB)  Standing Balance: Dependent/Total  Standing Balance  Time: 1 min total  Activity: stance for pants management at   Functional Mobility  Functional - Mobility Device: Wheelchair  Activity: To/from bathroom; Other (150ft in the hallway)  Assist Level: Modified independent   Toilet Transfers  Toilet - Technique: Sit pivot  Equipment Used: Standard toilet  Toilet Transfer: Moderate assistance;2 Person assistance  Toilet Transfers Comments: Mod A needed from w/c to toilet initially. Min Ax2 from toilet to w/c. Pt required max VCs for transfer technique. Pt demonstrated poor recall of technique and delayed processing this morning. Shower Transfers  Shower - Transfer From: Wheelchair  Shower - Transfer Type: To and From  Shower - Transfer To: Transfer tub bench  Shower - Technique: Sit pivot  Shower Transfers: Dependent  Shower Transfers Comments: Min Ax2 from w/c <> TTB. Max VCs needed to sequence transfer successfully and to arrange BLEs and BUEs appropriately in prep for transfer      Bed mobility  Rolling to Left: Modified independent  Rolling to Right: Modified independent  Supine to Sit: Minimal assistance;Supervision (Min A needed initially when exiting bed. Pt required max VCs for technique. Spvn on second attempt during session when pt was more awake and alert.  Min VCs needed for technique)  Sit to Supine: Modified independent      Transfers  Sit Pivot Transfers: 2 Person assistance (EOB > w/c CGA; w/c > elevated EOB Min Ax1 + Mod Ax1; EOB > w/c Mod Ax1)  Sit to stand: Dependent/Total (Mod A x2 from w/c to GB)  Stand to sit: Maximum assistance                           Cognition  Arousal/Alertness: Delayed responses to stimuli  Following Commands: Follows multistep commands with increased time; Follows multistep commands with repitition  Attention Span: Attends with cues to redirect  Memory: Decreased recall of recent events;Decreased short term memory  Safety Judgement: Decreased awareness of need for assistance  Problem Solving: Assistance required to generate solutions  Insights: Fully aware of deficits  Initiation: Requires cues for some  Sequencing: Requires cues for some  Cognition Comment: Pt required increased VCs for all transfers and ADLs today.  Pt's processing appeared delayed which may have been due to the fact that the pt is not a morning person and she typically does not like to do therapy in the AM.         Perception  Unilateral Attention: Cues to attend left visual field;Cues to attend to left side of body  Initiation: Cues to initiate tasks                                       Plan   Plan  Times per week: 5x a week for 75 mins daily  Times per day: Daily  Current Treatment Recommendations: Strengthening, Wheelchair Mobility Training, Positioning, ROM, Safety Education & Training, Balance Training, Patient/Caregiver Education & Training, Self-Care / ADL, Cognitive/Perceptual Training, Functional Mobility Training, Neuromuscular Re-education, Equipment Evaluation, Education, & procurement, Home Management Training, Endurance Training  G-Code     OutComes Score                                                  AM-PAC Score             Goals  Short term goals  Time Frame for Short term goals: 2 weeks  Short term goal 1: Pt will complete toilet transfer w/ Mod A-goal met 6/11; continue for consistency due to fluctuations w/ transfer status  Short term goal 2: Pt will complete LE dressing supine in bed w/ Min A- not met  Short term goal 3: Pt will complete UE dressing w/ Min A-goal met 5/25  Short term goal 4: Pt will complete bathing tasks w/ Min A-goal met 6/4  Short term goal 5: Pt will demonstrate improved functional use of LUE as evident by pt ability to open grooming containers I'ly-not met  Long term goals  Time Frame for Long term goals : 4 weeks  Long term goal 1: Pt will complete toilet transfer w/ CGA-not met  Long term goal 2: Pt will complete toileting tasks w/ CGA-not met  Long term goal 3: Pt will complete LE dressing (either supine or bed or seated ) w/ CGA-not met  Long term goal 4: Pt will complete UE dressing w/ setup and spvn-goal met 6/11  Long term goal 5: Pt will complete bathing tasks w/ SBA-not met  Patient Goals   Patient goals : \"be able to walk\"       Therapy Time   Individual Concurrent Group Co-treatment   Time In       0730   Time Out       0900   Minutes       90   Timed Code Treatment Minutes: 90 Minutes       Priscilla Nance, OT

## 2021-06-11 NOTE — PROGRESS NOTES
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, unspecified ([facility] [address] [I61.2]    Precautions: [] Aspiration  [x] Fall risk  [] Sternal  [] Seizure [] Hip  [] Weight Bearing [] Other  ST Dx: [] Aphasia  [] Dysarthria  [] Apraxia   [] Oropharyngeal dysphagia [x] Cognitive Impairment  [] Other:   Date of Admit: 2021  Room #: 3104/3104-01  Date: 2021          Current Diet Order:DIET GENERAL;  Dietary Nutrition Supplements: Standard High Calorie Oral Supplement   Vision  Vision: Impaired  Vision Exceptions: Wears glasses at all times  Hearing  Hearing: Within functional limits     Comments: Per admitting H&P (2021): 'Patient is a 66 yo female who originally admitted to hospital /-21 for Right intracerebral hemorrhage with L hemiparesis. She has a know past medical hx of systolic CHF (EF 03%), A Fib (on warfarin), ICD, HTN, Hypothyroidism, rectal cancer s/p resection, and ITP. Her hemorrhage became worse with a midline shift and decline in her neurologic status. Patient was started on seizure prophalxis, and neurosurgery followed, but no indication for surgery. She was also treated for UTI, anti-hypertensives adjusted to maintain bp in normal range  with ICH. She failed MBS and GI consulted who recommended PEG. She was also treated with zosyn for possible aspiration pneumonia. S/P PEG placed on 21. EGD recommended PPI therapy. She remained stable and was discharged to [facility]. Patient presented to SNF on 21. She improved with her swallowing and tolerated a regular diet,  and GI removed PEG on 21. Patient has made significant progress in her alertness and activity tolerance, and it is felt that she would benefit from and could tolerate intensive inpatient rehab unit treatment at this time.   Prior to her ICH, she was independent will all ADLs and functional mobility without any AD. She is currently supervision for bed mobility, Max A for stand pivot transfer, Osei for UE dress, MaxA for LB dressing, Osei for slide board transfers, and supervision for WC mobility. She is highly motivated and able to participate in 3 hrs of therapy per day in ARU setting. '  Social/Functional History  Lives With: Spouse;Daughter  Active : Yes  Occupation: Unemployed  [address]: Enjoys going to the [id number]  Additional Comments: Manages own medications, finances    Barriers toward progress: Limited progress noted in memory encoding and recall     Date: 6/11/2021      Tx session 1 Discharge Summary    Total Timed Code Min 30    Total Treatment Minutes 30    Individual Treatment Minutes 30    Group Treatment Minutes 0    Co-Treat Minutes 0    Brief Exception: N/A    Pain None indicated     Pain Intervention: [] RN notified  [] Repositioned  [] Intervention offered and patient declined  [x] N/A  [] Other:     Subjective:     Pt upright in chair and agreeable to therapy. Vendor delivered w/c during session. Personal phone call received during therapy with pt appropriately requesting to answer and terminating quickly. Objective / Goals:     Patient will participate in ongoing cognitive-linguistic assessment. GOAL MET   GOAL MET   Patient will complete tasks involving executive function skills with 90% accuracy/min cues. ID incongruities: min cues GOAL MET   Patient will complete graded recall tasks with 85% accuracy or min cues. Independent reasonable recall of daily events and evidence of prospective memory. Recall of 5 paired pictures targeting association strategy -   - immediate: 100% accuracy without cues  - 10\" delayed recognition: 100% accuracy without cues. Recall 3-4 elements: 78% accuracy with mod cues.   GOAL NOT MET   Other areas targeted:     Education:   Education ongoing regarding rationale for tasks. Safety Devices: [x] Call light within reach  [x] Chair alarm activated and connected to nurse call light system  [] Bed alarm activated   [x] Other: RN present      Assessment: Memory impairments    Plan: Scheduled for discharge tomorrow. Ongoing treatment indicated.       Interventions used this date:  [] Speech/Language Treatment  [] Instruction in HEP  [] Dysphagia Treatment [x] Cognitive Treatment   [] Other:  Discharge recommendations:  [] Home independently  [x] Home with assistance []  24 hour supervision  [] ECF [] Other  Continued Tx Upon Discharge: ? [] Yes    [] No    [x] TBD based on progress while on ARU     [] Vital Stim indicated     [] Other:   Estimated discharge date: 6/12/21    Cary Ward, Ahmet Flower., Eulogio  Speech-Language Pathologist

## 2021-06-11 NOTE — CARE COORDINATION
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 Clinic patient: No    Hospitalization in the last 30 days: Yes    Advance Directives:  Code Status: Full Code  [place] DNR form completed and on chart: No    Financial:  Payor: Garrison Steve / Plan: MEDICARE PART A AND B / Product Type: *No Product type* /      Pharmacy:    [facility] [place] [address]  Phone: [phone] Fax: [phone]      Assistance purchasing medications?: Potential Assistance Purchasing Medications: No  Assistance provided by Case Management: None at this time    Does patient want to participate in local refill/ meds to beds program?: No    Meds To Beds General Rules:  1. Can ONLY be done Monday- Friday between 8:30am-5pm  2. Prescription(s) must be in pharmacy by 3pm to be filled same day  3. Copy of patient's insurance/ prescription drug card and patient face sheet must be sent along with the prescription(s)  4. Cost of Rx cannot be added to hospital bill. If financial assistance is needed, please contact unit  or ;  or  CANNOT provide pharmacy voucher for patients co-pays  5.  Patients can then  the prescription on their way out of the hospital at discharge, or pharmacy can deliver to the bedside if staff is available. (payment due at time of pick-up or delivery - cash, check, or card accepted)     Able to afford home medications/ co-pay costs: Yes    ADLS:  Current PT AM-PAC Score:   /24  Current OT AM-PAC Score:   /24 DISCHARGE Disposition: Home with Home Health Care: [facility]     LOC at discharge: Not Applicable  ROXI Completed: Yes    Notification completed in HENS/PAS?:  Not Applicable    IMM Completed:   Yes, Case management has presented and reviewed IMM letter #2 to the patient and/or family/ POA. Patient and/or family/POA verbalized understanding of their medicare rights and appeal process if needed. Patient and/or family/POA has signed, initialed and placed today's date (6/11/21) and time (Marbryante Skeen) on IMM letter #2 on the the appropriate lines. Patient and/or family/POA, copy of letter offered and they are aware that this original copy of IMM letter #2 is available prior to discharge from the paper chart on the unit. Electronic documentation has been entered into epic for IMM letter #2 and original paper copy has been added to the paper chart at the nurses station. Transportation:  Transportation PLAN for discharge: EMS transportation   Mode of Transport: [address]  Reason for medical transport: Not Applicable  Name of [address]: [place], [phone]   Phone: [phone]  Time of Transport: 1300    Transport form completed: Yes    Home Care:  [address] ordered at discharge: Yes  [address]: [facility] [phone]  Phone: [phone]  Fax: [phone]  Orders faxed: Yes    Durable Medical Equipment:  DME Provider: Darren Jeff obtained during hospitalization: wheelchair    Home Oxygen and Respiratory Equipment:  Oxygen needed at discharge?: Not [address]: Not Applicable      Referrals made at [facility] for outpatient continued care:  Not Applicable    Additional CM Notes: Referred to patient for d/c planning. Patient to d/c home tomorrow with  and daughter to assist.  Patient and family notified and agreeable. Referral made to [facility]. Patient provided wheelchair per [facility].   Transport arranged through [employer] transport for wheelchair transport home. All questions answered. No other needs at this time. 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  Yes    Care Transitions patient: No    ISSA Perodmo, SUZIES  [facility] [employer]  Case Management Department  Ph: [phone]

## 2021-06-12 VITALS
TEMPERATURE: 97.8 F | WEIGHT: 174.16 LBS | OXYGEN SATURATION: 94 % | RESPIRATION RATE: 16 BRPM | SYSTOLIC BLOOD PRESSURE: 137 MMHG | DIASTOLIC BLOOD PRESSURE: 71 MMHG | HEART RATE: 69 BPM | HEIGHT: 62 IN | BODY MASS INDEX: 32.05 KG/M2

## 2021-06-12 PROCEDURE — 6370000000 HC RX 637 (ALT 250 FOR IP): Performed by: PHYSICAL MEDICINE & REHABILITATION

## 2021-06-12 PROCEDURE — 99239 HOSP IP/OBS DSCHRG MGMT >30: CPT | Performed by: PHYSICAL MEDICINE & REHABILITATION

## 2021-06-12 PROCEDURE — 6360000002 HC RX W HCPCS: Performed by: PHYSICAL MEDICINE & REHABILITATION

## 2021-06-12 RX ADMIN — DICLOFENAC SODIUM 2 G: 10 GEL TOPICAL at 10:34

## 2021-06-12 RX ADMIN — ASPIRIN 81 MG: 81 TABLET, CHEWABLE ORAL at 10:34

## 2021-06-12 RX ADMIN — MAGNESIUM OXIDE TAB 400 MG (240 MG ELEMENTAL MG) 400 MG: 400 (240 MG) TAB at 10:33

## 2021-06-12 RX ADMIN — AMLODIPINE BESYLATE 5 MG: 5 TABLET ORAL at 10:34

## 2021-06-12 RX ADMIN — PANTOPRAZOLE SODIUM 40 MG: 40 TABLET, DELAYED RELEASE ORAL at 10:34

## 2021-06-12 RX ADMIN — PANTOPRAZOLE SODIUM 40 MG: 40 TABLET, DELAYED RELEASE ORAL at 07:39

## 2021-06-12 RX ADMIN — POTASSIUM CHLORIDE 20 MEQ: 20 TABLET, EXTENDED RELEASE ORAL at 10:34

## 2021-06-12 RX ADMIN — FUROSEMIDE 20 MG: 20 TABLET ORAL at 10:34

## 2021-06-12 RX ADMIN — POLYETHYLENE GLYCOL 3350 17 G: 17 POWDER, FOR SOLUTION ORAL at 10:32

## 2021-06-12 RX ADMIN — CITALOPRAM HYDROBROMIDE 20 MG: 20 TABLET ORAL at 10:33

## 2021-06-12 RX ADMIN — PREDNISONE 7.5 MG: 5 TABLET ORAL at 10:32

## 2021-06-12 RX ADMIN — Medication 2000 UNITS: at 10:33

## 2021-06-12 RX ADMIN — ENOXAPARIN SODIUM 40 MG: 40 INJECTION SUBCUTANEOUS at 10:32

## 2021-06-12 RX ADMIN — CARVEDILOL 25 MG: 12.5 TABLET, FILM COATED ORAL at 10:33

## 2021-06-12 RX ADMIN — LISINOPRIL 40 MG: 40 TABLET ORAL at 10:33

## 2021-06-12 RX ADMIN — LEVOTHYROXINE SODIUM 50 MCG: 0.05 TABLET ORAL at 07:39

## 2021-06-12 RX ADMIN — MICONAZOLE NITRATE: 20 POWDER TOPICAL at 10:34

## 2021-06-12 ASSESSMENT — PAIN SCALES - GENERAL: PAINLEVEL_OUTOF10: 0

## 2021-06-12 NOTE — PROGRESS NOTES
Patient assessment complete and VSS. Patient is afebrile. Denies any pain or discomfort at this time. Daughters at bedside. Discharge instruction given and a period of time dedicated to answering questions regarding care of patient, medications, and follow up appointments. Patient assisted to main lobby and transported to awaiting w/c accessible minivan transport service. All patient belongings taken by her daughters. [address] patient transported in taken home with daughters as wrong w/c for patient was delivered. Patient left with Manus [place]ilton .

## 2021-06-12 NOTE — PROGRESS NOTES
SHIFT: 0700 - 1930  Pt this shift was stable and in no apparent distress. VS were stable. Pt denied having chest pain or SOB. Alert and oriented. Pt verbalized having generalized pain at 1651. Pt was medicated with Tylenol. Tylenol was effective. PT transferred use scoot boost  technique. Pt participated well in therapies. Pt showered with OT this shift. Continent of bladder. Colostomy bag changed x 2 this shift with large amount of stool being noted at each change. Pt at end of shift and was repositioned in bed. Bed alarm is on and call light was within reach. Pt to discharge home tomorrow with hospital [facility] and daughter is to bring back to hospital when pt gets new WC delivered.     Electronically signed by Tolu Lizarraga RN on 6/11/2021 at 8:19 PM

## 2021-06-12 NOTE — PLAN OF CARE
Problem: Falls - Risk of:  Goal: Will remain free from falls  Description: Will remain free from falls  6/12/2021 1245 by Matthieu Marinelli  Outcome: Completed  6/12/2021 0046 by Matt Cerda RN  Outcome: Ongoing  Note: Client remains free from falls, bed/chair alarm in place, door open, encouraged to use call light for needs, call light is within reach, bed locked in lowest position,  Will continue to monitor. Goal: Absence of physical injury  Description: Absence of physical injury  Outcome: Completed     Problem: Skin Integrity:  Goal: Will show no infection signs and symptoms  Description: Will show no infection signs and symptoms  Outcome: Completed  Goal: Absence of new skin breakdown  Description: Absence of new skin breakdown  6/12/2021 1245 by Matthieu Marinelli  Outcome: Completed  6/12/2021 0046 by Matt Cerda RN  Outcome: Ongoing  Note:   See Sandoval scale. Encourage/assist pt to turn and reposition every two hours and as needed. Heels elevated off bed. Protective barrier placed as needed. Patient kept clean and dry. Pillows used for positioning. Will continue to monitor for skin breakdown. Problem: Nutrition  Goal: Optimal nutrition therapy  Outcome: Completed     Problem: Pain:  Goal: Pain level will decrease  Description: Pain level will decrease  Outcome: Completed  Goal: Control of acute pain  Description: Control of acute pain  6/12/2021 1245 by Matthieu Marinelli  Outcome: Completed  6/12/2021 0046 by Matt Cerda RN  Outcome: Ongoing  Note: Pt voices pain needs appropriately, pain is assessed during shift.     Goal: Control of chronic pain  Description: Control of chronic pain  Outcome: Completed

## 2021-06-12 NOTE — DISCHARGE SUMMARY
Physical Medicine & Rehabilitation  Discharge Summary     Patient Identification:  Andi Velazquez  : 1945  Admit date: 2021  Discharge date:  21  Attending provider: Edith Santiago DO        Primary care provider: No primary care provider on file. Discharge Diagnoses:   Patient Active Problem List   Diagnosis    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, unspecified ([facility] [address]       History of Present Illness/Acute Hospital Course:  Patient is a 77 yo female who originally admitted to hospital 21-21 for Right intracerebral hemorrhage with L hemiparesis. She has a know past medical hx of systolic CHF (EF 72%), A Fib (on warfarin), ICD, HTN, Hypothyroidism, rectal cancer s/p resection, and ITP. Her hemorrhage became worse with a midline shift and decline in her neurologic status. Patient was started on seizure prophalxis, and neurosurgery followed, but no indication for surgery. She was also treated for UTI, anti-hypertensives adjusted to maintain bp in normal range  with ICH. She failed MBS and GI consulted who recommended PEG. She was also treated with zosyn for possible aspiration pneumonia. S/P PEG placed on 21. EGD recommended PPI therapy. She remained stable and was discharged to [facility]. Patient presented to SNF on 21. She improved with her swallowing and tolerated a regular diet,  and GI removed PEG on 21. Patient has made significant progress in her alertness and activity tolerance, and it is felt that she would benefit from and could tolerate intensive inpatient rehab unit treatment at this time.  Prior to her ICH, she was independent will all ADLs and functional mobility without any AD. She is currently supervision for bed mobility, Max A for stand pivot transfer, Osei for UE dress, MaxA for LB dressing, Osei for slide board transfers, and supervision for WC mobility.  She is highly motivated and able to participate in 3 hrs of therapy per day in throughout transfers, pt requiring only CGA from bed>chair initially but required mod A the second (possible d/t fatigue). Pt performed w/c>bed with mod A x1 + min A x1 (d/t incline into bed))  Squat Pivot Transfers: 2 Person Assistance (pt performed scoot pivot transfers w/c>toilet with mod A x1, toilet>w/c with min A x2 and w/c<>tub bench with min A x2, second person needed for maintaining balance and stabilizing chair)  Comment: PT/OT assisted pt with toileting. Refer to OT note for pt's status. PT assisted with pt standing using the GB and mod A x1., WB Status: Pt is not safe for ambulation at this time  Ambulation 1  Surface: level tile  Device:  (Lite Gait)  Other Apparatus: Wheelchair follow  Assistance: Dependent/Total  Quality of Gait: Note, LG attempted  multiple times with using 2 different sizes of harnesses to optimize WB in B LES. (n abdominal binder was used with additional padding to protect the colostomy. ). Unfortunately, neither option provided a successful option. Pt demo decreased BLE strength andnd decreased trunk strength resulting in pt flexing at both hips and knees. Pt had increased knee pain which also limited her abilities. Pt was able to advance both extrem in a walking pattern but most of pt's body weight was supported by the LG. Therapy also used an DIMITRI RW for increased WB in LES but pain resulted again imiting pt's ability to stand. Pt also c/o severe fatigue by the end of session  Distance: <10'  Comments: Extremely poor quality gt,    Mobility:  , PT Equipment Recommendations  Equipment Needed: Yes  Mobility Devices: Wheelchair  Wheelchair: Light Weight  Other: Reccomending lightweight cong height 20\" w/c with flip back armrests, 90 degree foot rests, and brake extension on L side. Pt is nonambulatory at this time and will have increased independence with mobility in home and community with w/c., Assessment: Pt tolerated tx well this date.  Pt performing transfers with varying assist levels between CGA and min A x2 depending on transfer surface and fatigue level. Pt performing bed mobility with supervision but occasionally requires increased time to perform. Pt performing sit<>stand transfers with grab bars and mod A x2 but it unable to acheive upright posture and can only tolerate ~15-20\" in standing. Pt able to meet 3/4 short term goals and 2/4 long term goals during this admission. Pt plans to return home with assist from daughters 24/7, use of w/c for mobility, and performing scoot pivots for transferring. Pt would benefit from continued PT to improve independence with functional mobilty, LE strength/ROM, balance, and activity tolerance. Occupational therapy:  ,  , Assessment: Pt has demonstrated good progress in OT since admission and pt has met 4/10 OT goals. Pt is now completing scoot pivot transfers w/ increase independence and she is able to complete UE dressing w/ setup. Pt requires fluctuating levels of assistance for transfers depending on the surface she is transferring to and depending on her fatigue. Pt continues to require up to 2 person assist at times however pt's family has been present for family training to assist pt at d/c. Pt plans to return home w/ 24hr assistance from family and [facility] OT. OT educated family regarding equipment needs and family was going to purchase equipment. Pt had no questions about d/c and reported she is ready to go home tomorrow.     Speech therapy:         Significant Diagnostics:   Lab Results   Component Value Date    CREATININE <0.5 (L) 06/10/2021    BUN 11 06/10/2021     06/10/2021    K 4.2 06/10/2021     06/10/2021    CO2 27 06/10/2021       Lab Results   Component Value Date    WBC 5.4 06/10/2021    HGB 11.6 (L) 06/10/2021    HCT 34.3 (L) 06/10/2021    MCV 90.9 06/10/2021     06/10/2021       Disposition:  home    Services: PT/OT  DME: WC    Discharge Condition: Stable    Follow-up:  See after visit summary from hospitalization    Discharge Medications:     Medication List      START taking these medications    amLODIPine 5 MG tablet  Commonly known as: NORVASC  Take 1 tablet by mouth daily     diclofenac sodium 1 % Gel  Commonly known as: VOLTAREN  Apply 2 g topically 2 times daily     magnesium oxide 400 (240 Mg) MG tablet  Commonly known as: MAG-OX  Take 1 tablet by mouth 4 times daily     melatonin 5 MG Tbdp disintegrating tablet  Take 1 tablet by mouth nightly  Replaces: melatonin 3 MG Tabs tablet     pantoprazole 40 MG tablet  Commonly known as: PROTONIX  Take 1 tablet by mouth every morning (before breakfast)     polyethylene glycol 17 g packet  Commonly known as: GLYCOLAX  Take 17 g by mouth daily     potassium chloride 20 MEQ extended release tablet  Commonly known as: KLOR-CON M  Take 1 tablet by mouth daily (with breakfast)     vitamin D 50 MCG (2000 UT) Tabs tablet  Commonly known as: CHOLECALCIFEROL  Take 1 tablet by mouth daily  Replaces: Cholecalciferol 50 MCG (2000 UT) Caps        CHANGE how you take these medications    furosemide 20 MG tablet  Commonly known as: LASIX  Take 1 tablet by mouth daily  What changed: when to take this        CONTINUE taking these medications    acetaminophen 500 MG tablet  Commonly known as: TYLENOL     aspirin 81 MG chewable tablet     atorvastatin 40 MG tablet  Commonly known as: LIPITOR     Biofreeze 4 % Gel  Generic drug: Menthol (Topical Analgesic)     carvedilol 25 MG tablet  Commonly known as: COREG     citalopram 20 MG tablet  Commonly known as: CELEXA     * dimethicone-zinc oxide cream     * STEFAN MOISTURE BARRIER EX     folic acid-pyridoxine-cyanocobalamine 2.5-25-1 MG Tabs tablet  Commonly known as: FOLTX     levothyroxine 50 MCG tablet  Commonly known as: SYNTHROID  Take 1 tablet by mouth Daily     lisinopril 40 MG tablet  Commonly known as: PRINIVIL;ZESTRIL     miconazole nitrate 2 % Aerp  Commonly known as: LOTRIMIN AF     predniSONE 5 MG tablet  Commonly known as: DELTASONE     PSYLLIUM PO     sodium chloride 0.65 % nasal spray  Commonly known as: OCEAN         * This list has 2 medication(s) that are the same as other medications prescribed for you. Read the directions carefully, and ask your doctor or other care provider to review them with you. STOP taking these medications    Cholecalciferol 50 MCG (2000 UT) Caps  Replaced by: vitamin D 50 MCG (2000 UT) Tabs tablet     lansoprazole 30 MG delayed release capsule  Commonly known as: PREVACID     melatonin 3 MG Tabs tablet  Replaced by: melatonin 5 MG Tbdp disintegrating tablet     potassium chloride 20 MEQ/15ML (10%) oral solution     tuberculin 5 UNIT/0.1ML injection           Where to Get Your Medications      These medications were sent to [address] [facility]    Phone: [phone]   · amLODIPine 5 MG tablet  · diclofenac sodium 1 % Gel  · furosemide 20 MG tablet  · levothyroxine 50 MCG tablet  · magnesium oxide 400 (240 Mg) MG tablet  · melatonin 5 MG Tbdp disintegrating tablet  · pantoprazole 40 MG tablet  · polyethylene glycol 17 g packet  · potassium chloride 20 MEQ extended release tablet  · vitamin D 50 MCG (2000 UT) Tabs tablet           I spent over 35 minutes on this discharge encounter between counseling, coordination of care, and medication reconciliation. To comply with Luz Marina MORALESII.4.1:   Discharge order placed in advance to facilitate patients discharge needs.       Tod Riggs, DO  \

## 2021-06-13 NOTE — PROGRESS NOTES
Pt awake in wheelchair eating breakfast. Physical assessment and vital signs as charted. Pt currently denies experiencing any pain at this time. Call light placed within reach. RN will continue to monitor Pt. 13-Apr-2021

## 2023-09-22 ENCOUNTER — APPOINTMENT (OUTPATIENT)
Dept: CT IMAGING | Age: 78
End: 2023-09-22
Payer: MEDICARE

## 2023-09-22 ENCOUNTER — HOSPITAL ENCOUNTER (EMERGENCY)
Age: 78
Discharge: HOME | End: 2023-09-22
Payer: MEDICARE

## 2023-09-22 VITALS
SYSTOLIC BLOOD PRESSURE: 140 MMHG | OXYGEN SATURATION: 96 % | TEMPERATURE: 98.4 F | HEART RATE: 56 BPM | WEIGHT: 208.2 LBS | HEIGHT: 62 IN | DIASTOLIC BLOOD PRESSURE: 60 MMHG | RESPIRATION RATE: 23 BRPM | BODY MASS INDEX: 38.31 KG/M2

## 2023-09-22 DIAGNOSIS — S09.90XA INJURY OF HEAD, INITIAL ENCOUNTER: ICD-10-CM

## 2023-09-22 DIAGNOSIS — S01.01XA LACERATION OF SCALP, INITIAL ENCOUNTER: ICD-10-CM
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
## 2023-09-22 LAB
EKG ATRIAL RATE: 40 BPM
EKG DIAGNOSIS: NORMAL
EKG Q-T INTERVAL: 504 MS
EKG QRS DURATION: 92 MS
EKG QTC CALCULATION (BAZETT): 446 MS
EKG R AXIS: -8 DEGREES
EKG T AXIS: 24 DEGREES
EKG VENTRICULAR RATE: 47 BPM

## 2023-09-22 PROCEDURE — 70450 CT HEAD/BRAIN W/O DYE: CPT

## 2023-09-22 PROCEDURE — 93010 ELECTROCARDIOGRAM REPORT: CPT | Performed by: INTERNAL MEDICINE

## 2023-09-22 PROCEDURE — 90471 IMMUNIZATION ADMIN: CPT | Performed by: PHYSICIAN ASSISTANT

## 2023-09-22 PROCEDURE — 72125 CT NECK SPINE W/O DYE: CPT

## 2023-09-22 PROCEDURE — 6360000002 HC RX W HCPCS: Performed by: PHYSICIAN ASSISTANT

## 2023-09-22 PROCEDURE — 90715 TDAP VACCINE 7 YRS/> IM: CPT | Performed by: PHYSICIAN ASSISTANT

## 2023-09-22 PROCEDURE — 99284 EMERGENCY DEPT VISIT MOD MDM: CPT

## 2023-09-22 PROCEDURE — 12001 RPR S/N/AX/GEN/TRNK 2.5CM/<: CPT

## 2023-09-22 PROCEDURE — 93005 ELECTROCARDIOGRAM TRACING: CPT | Performed by: PHYSICIAN ASSISTANT

## 2023-09-22 RX ADMIN — TETANUS TOXOID, REDUCED DIPHTHERIA TOXOID AND ACELLULAR PERTUSSIS VACCINE, ADSORBED 0.5 ML: 5; 2.5; 8; 8; 2.5 SUSPENSION INTRAMUSCULAR at 15:07

## 2023-09-22 ASSESSMENT — PAIN DESCRIPTION - PAIN TYPE: TYPE: ACUTE PAIN

## 2023-09-22 ASSESSMENT — LIFESTYLE VARIABLES
HOW OFTEN DO YOU HAVE A DRINK CONTAINING ALCOHOL: MONTHLY OR LESS
HOW MANY STANDARD DRINKS CONTAINING ALCOHOL DO YOU HAVE ON A TYPICAL DAY: 1 OR 2

## 2023-09-22 ASSESSMENT — PAIN - FUNCTIONAL ASSESSMENT: PAIN_FUNCTIONAL_ASSESSMENT: 0-10

## 2023-09-22 ASSESSMENT — PAIN SCALES - GENERAL: PAINLEVEL_OUTOF10: 4

## 2023-09-22 NOTE — ED PROVIDER NOTES
updated her and her daughter on her imaging results and again, repaired the scalp laceration. At this point I do believe the patient can be safely discharged back to her nursing facility. Exclusion criteria - the patient is NOT to be included for SEP-1 Core Measure due to: Infection is not suspected     Consults/discussion with other professionals: None  Social determinants: None  Chronic conditions: Prior CVA with left-sided weakness, CAD, CHF, hypertension, thyroid disease, history of rectal cancer, arthritis  Records reviewed: Reviewed records to try to determine when patient last received a tetanus vaccine. I cannot find a recent dose. Disposition considerations/Plan: Patient suffered a fall while being transferred in the bathroom at her SNF. She hit the back of her head causing a scalp laceration. Imaging studies of the head and neck are negative. Her scalp wound is repaired. Her tetanus vaccine is updated. She denies other injuries and has no other apparent abnormality on physical exam.  She will be discharged from here to follow-up as needed with her doctor. Transportation from [facility] back to the [facility] will be orchestrated. Employed shared decision making. FINAL IMPRESSION:      1. Fall, initial encounter    2. Injury of head, initial encounter    3.  Laceration of scalp, initial encounter          DISPOSITION/PLAN:   DISPOSITION Discharge - Pending Orders Complete      PATIENT REFERRED TO:  Tasha Sarabia MD  [address] [place] [id number] [phone]    Schedule an appointment as soon as possible for a visit   STAPLE REMOVAL IN 7-10 DAYS      DISCHARGE MEDICATIONS:  New Prescriptions    No medications on file                  (Please note thatportions of this note were completed with a voice recognition program.  Efforts were made to edit the dictations, but occasionally words are mis-transcribed.)    Herman Vega PA-C (electronicallysigned)              SYLVAIN Mack  09/22/23 [id number]

## 2023-09-22 NOTE — ED NOTES
Writer reviewed discharge instructions, follow-up with family practice. family verbalized understanding. Patient stable, in wheelchair, Pts daughters volunteered to take pt home by private vehicle instead of arranging ambulance transport , GCS 15, no signs/ symptoms of acute distress, respirations unlabored, and with family. Rosa Stokes RN  09/22/23 2915

## 2023-09-22 NOTE — ED NOTES
Patient identified as a positive fall risk on the ED triage fall screening. Patient placed in fall precautions which includes:  yellow fall risk bracelet on wrist and yellow socks on feet. Patient instructed on importance of not getting out of bed or ambulating without assistance for safety. Pt verbalized understanding.          Doretha Bermudez RN  09/22/23 9743

## 2023-09-22 NOTE — ED NOTES
Performed wound care. Cleaned laceration with normal saline and hibiclens. Irrigated laceration with ~400 mL of sterile saline. Sterile technique used for cleaning. Patient tolerated well.       Jason Rings  09/22/23 [id number]

## 2023-09-22 NOTE — CARE COORDINATION
Refer to patient for transport to return to the [facility] to transport 6:45pm Electronically signed by BOB Grant Student on 9/22/2023 at 2:09 PM Electronically signed by ISSA Pérez on 9/22/2023 at 2:09 PM

## 2024-04-25 NOTE — PROGRESS NOTES
Kristin Victoriachemojevon    Age 78 y.o.    female    1945    MRN [id number]    5/2/2024  Arrival Time_____________  OR Time____________30 Min     Procedure(s):  ESOPHAGOGASTRODUODENOSCOPY WITH DILATATION                      General    Surgeon(s):  Noam, Said, MD       Phone [phone] (home)     [facility]  Date  Info Source  Home  Cell         Work  _____________________________________________________________________  _____________________________________________________________________  _____________________________________________________________________  _____________________________________________________________________  _____________________________________________________________________    PCP _____________________________ Phone_________________     H&P  ________________  Bringing      Chart              Epic      DOS      Called________  EKG ________________   Bringing      Chart              Epic      DOS      Called________  LABS________________   Bringing     Chart              Epic      DOS      Called________  Cardiac Clearance ______ Bringing      Chart              Epic      DOS      Called________  Pulmonary Clearance____ Bringing      Chart              Epic      DOS      Called________    Cardiologist________________________ Phone___________________________  Pulmonologist_______________________Phone___________________________    ? Advance Directives   ? Rastafari concerns / Waiver on Chart            PAT Communications________________  ? Pre-op Instructions Given /Understood          _________________________________  ? Directions to [facility]                          _________________________________  ? Transportation Home_______________      __________________________________  ? Crutches/Walker__________________        __________________________________    Orders: Hard copy/ EPIC                 Transcribed/ EPIC              _______Wt.    ________Pharmacy                         _______SCD

## 2024-05-02 ENCOUNTER — HOSPITAL ENCOUNTER (OUTPATIENT)
Age: 79
Setting detail: OUTPATIENT SURGERY
Discharge: HOME | End: 2024-05-02
Attending: INTERNAL MEDICINE | Admitting: INTERNAL MEDICINE
Payer: MEDICARE

## 2024-05-02 ENCOUNTER — ANESTHESIA EVENT (OUTPATIENT)
Dept: ENDOSCOPY | Age: 79
End: 2024-05-02
Payer: MEDICARE

## 2024-05-02 ENCOUNTER — ANESTHESIA (OUTPATIENT)
Dept: ENDOSCOPY | Age: 79
End: 2024-05-02
Payer: MEDICARE

## 2024-05-02 VITALS
DIASTOLIC BLOOD PRESSURE: 71 MMHG | HEIGHT: 62 IN | RESPIRATION RATE: 20 BRPM | OXYGEN SATURATION: 95 % | BODY MASS INDEX: 38.28 KG/M2 | HEART RATE: 60 BPM | SYSTOLIC BLOOD PRESSURE: 103 MMHG | TEMPERATURE: 97.3 F | WEIGHT: 208 LBS

## 2024-05-02 PROCEDURE — 2580000003 HC RX 258: Performed by: NURSE ANESTHETIST, CERTIFIED REGISTERED

## 2024-05-02 PROCEDURE — 6360000002 HC RX W HCPCS: Performed by: NURSE ANESTHETIST, CERTIFIED REGISTERED

## 2024-05-02 PROCEDURE — 3700000000 HC ANESTHESIA ATTENDED CARE: Performed by: INTERNAL MEDICINE

## 2024-05-02 PROCEDURE — 7100000010 HC PHASE II RECOVERY - FIRST 15 MIN: Performed by: INTERNAL MEDICINE

## 2024-05-02 PROCEDURE — 3609017700 HC EGD DILATION GASTRIC/DUODENAL STRICTURE: Performed by: INTERNAL MEDICINE

## 2024-05-02 PROCEDURE — 6370000000 HC RX 637 (ALT 250 FOR IP)

## 2024-05-02 PROCEDURE — 2709999900 HC NON-CHARGEABLE SUPPLY: Performed by: INTERNAL MEDICINE

## 2024-05-02 PROCEDURE — 7100000011 HC PHASE II RECOVERY - ADDTL 15 MIN: Performed by: INTERNAL MEDICINE

## 2024-05-02 PROCEDURE — 2580000003 HC RX 258: Performed by: ANESTHESIOLOGY

## 2024-05-02 PROCEDURE — 2500000003 HC RX 250 WO HCPCS: Performed by: NURSE ANESTHETIST, CERTIFIED REGISTERED

## 2024-05-02 RX ORDER — SODIUM CHLORIDE 0.9 % (FLUSH) 0.9 %
5-40 SYRINGE (ML) INJECTION EVERY 12 HOURS SCHEDULED
Status: DISCONTINUED | OUTPATIENT
Start: 2024-05-02 | End: 2024-05-02 | Stop reason: HOSPADM

## 2024-05-02 RX ORDER — SODIUM CHLORIDE 0.9 % (FLUSH) 0.9 %
5-40 SYRINGE (ML) INJECTION PRN
Status: DISCONTINUED | OUTPATIENT
Start: 2024-05-02 | End: 2024-05-02 | Stop reason: HOSPADM

## 2024-05-02 RX ORDER — PROPOFOL 10 MG/ML
INJECTION, EMULSION INTRAVENOUS PRN
Status: DISCONTINUED | OUTPATIENT
Start: 2024-05-02 | End: 2024-05-02 | Stop reason: SDUPTHER

## 2024-05-02 RX ORDER — SODIUM CHLORIDE 9 MG/ML
INJECTION, SOLUTION INTRAVENOUS PRN
Status: DISCONTINUED | OUTPATIENT
Start: 2024-05-02 | End: 2024-05-02 | Stop reason: HOSPADM

## 2024-05-02 RX ORDER — ONDANSETRON 2 MG/ML
4 INJECTION INTRAMUSCULAR; INTRAVENOUS
Status: DISCONTINUED | OUTPATIENT
Start: 2024-05-02 | End: 2024-05-02 | Stop reason: HOSPADM

## 2024-05-02 RX ORDER — LIDOCAINE HYDROCHLORIDE 10 MG/ML
1 INJECTION, SOLUTION EPIDURAL; INFILTRATION; INTRACAUDAL; PERINEURAL
Status: DISCONTINUED | OUTPATIENT
Start: 2024-05-02 | End: 2024-05-02 | Stop reason: HOSPADM

## 2024-05-02 RX ORDER — OXYCODONE HYDROCHLORIDE 5 MG/1
5 TABLET ORAL
Status: DISCONTINUED | OUTPATIENT
Start: 2024-05-02 | End: 2024-05-02 | Stop reason: HOSPADM

## 2024-05-02 RX ORDER — NALOXONE HYDROCHLORIDE 0.4 MG/ML
INJECTION, SOLUTION INTRAMUSCULAR; INTRAVENOUS; SUBCUTANEOUS PRN
Status: DISCONTINUED | OUTPATIENT
Start: 2024-05-02 | End: 2024-05-02 | Stop reason: HOSPADM

## 2024-05-02 RX ORDER — SODIUM CHLORIDE, SODIUM LACTATE, POTASSIUM CHLORIDE, CALCIUM CHLORIDE 600; 310; 30; 20 MG/100ML; MG/100ML; MG/100ML; MG/100ML
INJECTION, SOLUTION INTRAVENOUS CONTINUOUS PRN
Status: DISCONTINUED | OUTPATIENT
Start: 2024-05-02 | End: 2024-05-02 | Stop reason: SDUPTHER

## 2024-05-02 RX ORDER — IPRATROPIUM BROMIDE AND ALBUTEROL SULFATE 2.5; .5 MG/3ML; MG/3ML
SOLUTION RESPIRATORY (INHALATION)
Status: COMPLETED
Start: 2024-05-02 | End: 2024-05-02

## 2024-05-02 RX ORDER — IPRATROPIUM BROMIDE AND ALBUTEROL SULFATE 2.5; .5 MG/3ML; MG/3ML
1 SOLUTION RESPIRATORY (INHALATION) ONCE
Status: COMPLETED | OUTPATIENT
Start: 2024-05-02 | End: 2024-05-02

## 2024-05-02 RX ORDER — LABETALOL HYDROCHLORIDE 5 MG/ML
10 INJECTION, SOLUTION INTRAVENOUS
Status: DISCONTINUED | OUTPATIENT
Start: 2024-05-02 | End: 2024-05-02 | Stop reason: HOSPADM

## 2024-05-02 RX ORDER — DIPHENHYDRAMINE HYDROCHLORIDE 50 MG/ML
12.5 INJECTION INTRAMUSCULAR; INTRAVENOUS
Status: DISCONTINUED | OUTPATIENT
Start: 2024-05-02 | End: 2024-05-02 | Stop reason: HOSPADM

## 2024-05-02 RX ORDER — LIDOCAINE HYDROCHLORIDE 20 MG/ML
INJECTION, SOLUTION INFILTRATION; PERINEURAL PRN
Status: DISCONTINUED | OUTPATIENT
Start: 2024-05-02 | End: 2024-05-02 | Stop reason: SDUPTHER

## 2024-05-02 RX ORDER — SODIUM CHLORIDE, SODIUM LACTATE, POTASSIUM CHLORIDE, CALCIUM CHLORIDE 600; 310; 30; 20 MG/100ML; MG/100ML; MG/100ML; MG/100ML
INJECTION, SOLUTION INTRAVENOUS CONTINUOUS
Status: DISCONTINUED | OUTPATIENT
Start: 2024-05-02 | End: 2024-05-02 | Stop reason: HOSPADM

## 2024-05-02 RX ORDER — MEPERIDINE HYDROCHLORIDE 50 MG/ML
12.5 INJECTION INTRAMUSCULAR; INTRAVENOUS; SUBCUTANEOUS EVERY 5 MIN PRN
Status: DISCONTINUED | OUTPATIENT
Start: 2024-05-02 | End: 2024-05-02 | Stop reason: HOSPADM

## 2024-05-02 RX ADMIN — IPRATROPIUM BROMIDE AND ALBUTEROL SULFATE 1 DOSE: .5; 2.5 SOLUTION RESPIRATORY (INHALATION) at 11:46

## 2024-05-02 RX ADMIN — PROPOFOL 50 MG: 10 INJECTION, EMULSION INTRAVENOUS at 11:27

## 2024-05-02 RX ADMIN — IPRATROPIUM BROMIDE AND ALBUTEROL SULFATE 1 DOSE: 2.5; .5 SOLUTION RESPIRATORY (INHALATION) at 11:46

## 2024-05-02 RX ADMIN — SODIUM CHLORIDE, SODIUM LACTATE, POTASSIUM CHLORIDE, AND CALCIUM CHLORIDE: .6; .31; .03; .02 INJECTION, SOLUTION INTRAVENOUS at 11:24

## 2024-05-02 RX ADMIN — LIDOCAINE HYDROCHLORIDE 80 MG: 20 INJECTION, SOLUTION INFILTRATION; PERINEURAL at 11:27

## 2024-05-02 RX ADMIN — SODIUM CHLORIDE, POTASSIUM CHLORIDE, SODIUM LACTATE AND CALCIUM CHLORIDE: 600; 310; 30; 20 INJECTION, SOLUTION INTRAVENOUS at 11:17

## 2024-05-02 ASSESSMENT — PAIN SCALES - GENERAL
PAINLEVEL_OUTOF10: 0

## 2024-05-02 ASSESSMENT — PAIN - FUNCTIONAL ASSESSMENT: PAIN_FUNCTIONAL_ASSESSMENT: 0-10

## 2024-05-02 NOTE — ANESTHESIA PRE PROCEDURE
Department of Anesthesiology  Preprocedure Note       Name:  Kristin Will   Age:  78 y.o.  :  1945                                          MRN:  [id number]         Date:  2024      Surgeon: Surgeon(s):  Boaz Santacruz MD    Procedure: Procedure(s):  ESOPHAGOGASTRODUODENOSCOPY WITH DILATATION    Medications prior to admission:   Prior to Admission medications    Medication Sig Start Date End Date Taking? Authorizing Provider   amLODIPine (NORVASC) 5 MG tablet Take 1 tablet by mouth daily 21   Tod Riggs DO   diclofenac sodium (VOLTAREN) 1 % GEL Apply 2 g topically 2 times daily 21   Tod Riggs DO   furosemide (LASIX) 20 MG tablet Take 1 tablet by mouth daily 21   Tod Riggs DO   melatonin 5 MG TBDP disintegrating tablet Take 1 tablet by mouth nightly 21   Tod Riggs DO   magnesium oxide (MAG-OX) 400 (240 Mg) MG tablet Take 1 tablet by mouth 4 times daily 21   Tod Riggs DO   potassium chloride (KLOR-CON M) 20 MEQ extended release tablet Take 1 tablet by mouth daily (with breakfast) 21   Tod Riggs DO   levothyroxine (SYNTHROID) 50 MCG tablet Take 1 tablet by mouth Daily 21   Tod Riggs DO   pantoprazole (PROTONIX) 40 MG tablet Take 1 tablet by mouth every morning (before breakfast) 21   Tod Riggs DO   Vitamin D (CHOLECALCIFEROL) 50 MCG ( UT) TABS tablet Take 1 tablet by mouth daily 21   Tod Riggs DO   lisinopril (PRINIVIL;ZESTRIL) 40 MG tablet Take 1 tablet by mouth daily    Neville Mancuso MD   miconazole nitrate (LOTRIMIN AF) 2 % AERP Apply topically 2 times daily Apply to under abd folds topically  Times two daily. AM and night shift for prevention.    Neville Mancuso MD   predniSONE (DELTASONE) 5 MG tablet Take 1.5 tablets by mouth daily    Neville Mancuso MD   PSYLLIUM PO Take 1 packet by mouth daily    Neville Mancuso MD   sodium chloride (OCEAN) 0.65 % nasal spray 1

## 2024-05-02 NOTE — H&P
Pre-sedation Assessment    History and Physical / Pre-Sedation Assessment  Patient:  Kristin Will   :   1945     Intended Procedure: EGD/dil      HPI: Is a NH resident w h/o colon CA and dysphagia to solids w abnormal Esophagram w Ba tablet    No current facility-administered medications for this encounter.     Past Medical History:   Diagnosis Date    Arthritis     Atrial fibrillation (HCC)     CAD (coronary artery disease)     Cancer (HCC)     Rectal    Cerebral artery occlusion with cerebral infarction (HCC)     CHF (congestive heart failure) (HCC)     Hypertension     Thyroid disease      Past Surgical History:   Procedure Laterality Date    ABDOMEN SURGERY      COLONOSCOPY      HERNIA REPAIR      HYSTERECTOMY (CERVIX STATUS UNKNOWN)      PACEMAKER PLACEMENT         Nurses notes reviewed and agreed.  Medications reviewed  Allergies:   Allergies   Allergen Reactions    Hydrochlorothiazide      Dump sodium    Levaquin [Levofloxacin]      Hives           Physical Exam:  Vital Signs: There were no vitals taken for this visit. There is no height or weight on file to calculate BMI.  Airway:Normal  Cardiac:Normal  Pulmonary:Normal  Abdomen:Normal  Specific to procedure: none      Pre-Procedure Assessment/Plan:  ASA 3 - Patient with moderate systemic disease with functional limitations    Level of Sedation Plan:Deep sedation    Post Procedure plan: Return to same level of care    I assessed the patient and find that the patient is in satisfactory condition to proceed with the planned procedure and sedation plan.    I have explained the risk, benefits, and alternatives to the procedure. The patient/POA understands and agrees to proceed.  Yes    Said MD Noam  10:50 AM 2024

## 2024-05-02 NOTE — ANESTHESIA POSTPROCEDURE EVALUATION
Department of Anesthesiology  Postprocedure Note    Patient: Kristin Will  MRN: [id number]  YOB: 1945  Date of evaluation: 5/2/2024    Procedure Summary       Date: 05/02/24 Room / Location: [place] [address] / [facility]    Anesthesia Start: 1125 Anesthesia Stop: 1144    Procedure: ESOPHAGOGASTRODUODENOSCOPY DILATATION Diagnosis:       Dysphagia, pharyngoesophageal phase      (Dysphagia, pharyngoesophageal phase [R13.14])    Surgeons: Boaz Santacruz MD Responsible Provider: Rashard Hendrix MD    Anesthesia Type: MAC ASA Status: 2            Anesthesia Type: No value filed.    Milton Phase I: Milton Score: 10    Milton Phase II: Milton Score: 10    Anesthesia Post Evaluation    Patient location during evaluation: PACU  Patient participation: complete - patient participated  Level of consciousness: awake and alert  Airway patency: patent  Nausea & Vomiting: no vomiting and no nausea  Cardiovascular status: hemodynamically stable and blood pressure returned to baseline  Respiratory status: acceptable  Hydration status: euvolemic  Comments: --------------------            05/02/24               1210     --------------------   BP:       103/71     Pulse:      60       Resp:       20       Temp:                SpO2:      95%      --------------------    Pain management: adequate    No notable events documented.

## 2024-05-02 NOTE — DISCHARGE INSTRUCTIONS
ENDOSCOPY:  Inspection of any cavity of the body by means of an endoscopy. An endoscope is a flexible tube that allows direct visualization of the cavity.    You have had a Esophagogastroduodenoscopy    Discharge Instructions    1)  You may experience some lightheadedness for the next several hours. We suggest you plan on quiet relation for the rest of the day.    2)  Because of the sedative drugs in your blood steam, be advised that you should not drive, operate any machinery, or sign contracts for the remainder of the day.    3)  You should not take any alcoholic beverages tonight, and only take sleeping medication that has been specifically prescribed for you by your physician.    4)  Unless instructed differently, resume your regular diet. Eat smaller portions for your first meal and progress to normal amounts over the rest of the day.    5)  If you have any fever, chills, excessive bleeding, uncontrolled pain, increased abdominal bloating, or any other problems, notify your doctor or return to the hospital.    6)  If you have a sore throat, you may use lozenges, or salt water gargles.    7) Call for biopsy results in one week:  [phone]    9)  Special Discharge Instructions:        ANESTHESIA DISCHARGE INSTRUCTIONS    You are under the influence of drugs- do not drink alcohol, drive a car, operate machinery(such as power tools, kitchen appliances, etc), sign legal documents, or make any important decisions for 24 hours (or while on pain medications).   Children should not ride bikes or skate boards or play on gym sets  for 24 hours after surgery.  A responsible adult should be with you for 24 hours.  Rest at home today- increase activity as tolerated.  Progress slowly to a regular diet unless your physician has instructed you otherwise. Drink plenty of water.    CALL YOUR DOCTOR IF YOU:  Have moderate to severe nausea or vomiting AND are unable to hold down fluids or prescribed medications.  Have

## (undated) DEVICE — CANNULA NSL AD TBNG L7FT PVC STR NONFLARED PRNG O2 DEL W STD

## (undated) DEVICE — CONMED SCOPE SAVER BITE BLOCK, 20X27 MM: Brand: SCOPE SAVER

## (undated) DEVICE — ENDOSCOPIC KIT 2 12 FT OP4 DE2 GWN SYR

## (undated) DEVICE — ELECTRODE,ECG,STRESS,FOAM,3PK: Brand: MEDLINE